# Patient Record
Sex: FEMALE | Race: BLACK OR AFRICAN AMERICAN | NOT HISPANIC OR LATINO | Employment: FULL TIME | URBAN - METROPOLITAN AREA
[De-identification: names, ages, dates, MRNs, and addresses within clinical notes are randomized per-mention and may not be internally consistent; named-entity substitution may affect disease eponyms.]

---

## 2018-08-18 ENCOUNTER — HOSPITAL ENCOUNTER (EMERGENCY)
Facility: HOSPITAL | Age: 48
Discharge: HOME | End: 2018-08-18
Attending: EMERGENCY MEDICINE
Payer: COMMERCIAL

## 2018-08-18 VITALS
TEMPERATURE: 97 F | DIASTOLIC BLOOD PRESSURE: 78 MMHG | WEIGHT: 180 LBS | HEART RATE: 84 BPM | HEIGHT: 65 IN | BODY MASS INDEX: 29.99 KG/M2 | RESPIRATION RATE: 20 BRPM | OXYGEN SATURATION: 98 % | SYSTOLIC BLOOD PRESSURE: 160 MMHG

## 2018-08-18 DIAGNOSIS — S30.0XXA CONTUSION OF LOWER BACK, INITIAL ENCOUNTER: ICD-10-CM
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
## 2018-08-18 PROCEDURE — 99284 EMERGENCY DEPT VISIT MOD MDM: CPT | Mod: 25

## 2018-08-18 PROCEDURE — 96375 TX/PRO/DX INJ NEW DRUG ADDON: CPT

## 2018-08-18 PROCEDURE — 63600175 PHARM REV CODE 636 W HCPCS: Performed by: PHYSICIAN ASSISTANT

## 2018-08-18 PROCEDURE — 96374 THER/PROPH/DIAG INJ IV PUSH: CPT

## 2018-08-18 PROCEDURE — 25000003 PHARM REV CODE 250: Performed by: PHYSICIAN ASSISTANT

## 2018-08-18 RX ORDER — NAPROXEN 500 MG/1
500 TABLET ORAL 2 TIMES DAILY WITH MEALS
Qty: 14 TABLET | Refills: 0 | Status: SHIPPED | OUTPATIENT
Start: 2018-08-18 | End: 2018-08-25

## 2018-08-18 RX ORDER — MORPHINE SULFATE 4 MG/ML
4 INJECTION, SOLUTION INTRAMUSCULAR; INTRAVENOUS
Status: COMPLETED | OUTPATIENT
Start: 2018-08-18 | End: 2018-08-18

## 2018-08-18 RX ORDER — HYDRALAZINE HYDROCHLORIDE 20 MG/ML
20 INJECTION INTRAMUSCULAR; INTRAVENOUS
Status: COMPLETED | OUTPATIENT
Start: 2018-08-18 | End: 2018-08-18

## 2018-08-18 RX ORDER — CYCLOBENZAPRINE HCL 10 MG
10 TABLET ORAL 3 TIMES DAILY PRN
Qty: 21 TABLET | Refills: 0 | Status: SHIPPED | OUTPATIENT
Start: 2018-08-18 | End: 2018-08-25

## 2018-08-18 RX ORDER — HYDROCHLOROTHIAZIDE 25 MG/1
25 TABLET ORAL
Status: COMPLETED | OUTPATIENT
Start: 2018-08-18 | End: 2018-08-18

## 2018-08-18 RX ORDER — AMLODIPINE BESYLATE 5 MG/1
5 TABLET ORAL
Status: COMPLETED | OUTPATIENT
Start: 2018-08-18 | End: 2018-08-18

## 2018-08-18 RX ADMIN — AMLODIPINE BESYLATE 5 MG: 5 TABLET ORAL at 02:08

## 2018-08-18 RX ADMIN — HYDRALAZINE HYDROCHLORIDE 20 MG: 20 INJECTION INTRAMUSCULAR; INTRAVENOUS at 02:08

## 2018-08-18 RX ADMIN — MORPHINE SULFATE 4 MG: 4 INJECTION, SOLUTION INTRAMUSCULAR; INTRAVENOUS at 01:08

## 2018-08-18 RX ADMIN — HYDROCHLOROTHIAZIDE 25 MG: 25 TABLET ORAL at 02:08

## 2018-08-18 NOTE — ED PROVIDER NOTES
Encounter Date: 8/18/2018    SCRIBE #1 NOTE: Anabell SALEH am scribing for, and in the presence of, Johanna Cramer PA-C.       History     Chief Complaint   Patient presents with    Fall    Tailbone Pain       Time seen by provider: 1:11 PM on 08/18/2018    Meg Lockhart is a 48 y.o. female with NIDDM, HTN, and HLD who presents to the ED with back pain and tailbone pain onset PTA. Patient states she was at the grocery store when she slipped on condensation from the doors and injured her left elbow, lower back, and tailbone. Patient denies any LOC, hitting her head, neck pain, bowel/bladder incontinence, numbness, weakness, nausea, or vomiting.       The history is provided by the patient. No  was used.     Review of patient's allergies indicates:   Allergen Reactions    Pcn [penicillins] Itching     Past Medical History:   Diagnosis Date    Anxiety     Diabetes mellitus     Elevated cholesterol     Hypertension      Past Surgical History:   Procedure Laterality Date    HYSTERECTOMY       Family History   Problem Relation Age of Onset    Heart disease Mother     Hypertension Mother     Stroke Brother     Cancer Maternal Aunt     Cancer Maternal Grandmother         lung    Heart disease Maternal Grandmother     Diabetes Paternal Grandmother     Heart disease Paternal Grandmother     Diabetes Paternal Grandfather      Social History     Tobacco Use    Smoking status: Never Smoker   Substance Use Topics    Alcohol use: No    Drug use: No     Review of Systems   Constitutional: Negative for activity change, appetite change, chills and fever.   HENT: Negative for congestion, rhinorrhea and sore throat.    Eyes: Negative for redness and visual disturbance.   Respiratory: Negative for cough, chest tightness and shortness of breath.    Cardiovascular: Negative for chest pain.   Gastrointestinal: Negative for abdominal pain, diarrhea, nausea and vomiting.   Genitourinary:  Negative for dysuria and frequency.   Musculoskeletal: Positive for arthralgias (elbow, tailbone) and back pain. Negative for neck pain and neck stiffness.   Skin: Negative for rash.   Neurological: Negative for dizziness, syncope, numbness and headaches.       Physical Exam     Initial Vitals [08/18/18 1303]   BP Pulse Resp Temp SpO2   (!) 205/114 76 20 97.5 °F (36.4 °C) 100 %      MAP       --         Physical Exam    Nursing note and vitals reviewed.  Constitutional: Vital signs are normal. She appears well-developed and well-nourished. She is cooperative.  Non-toxic appearance. She does not have a sickly appearance.   HENT:   Head: Normocephalic and atraumatic.   Right Ear: Abnromal external ear normal.   Left Ear: Abnormal external ear normal.   Nose: Nose abnormal.   Mouth/Throat: Oropharynx is clear and moist.   Eyes: Conjunctivae and lids are normal. Pupils are equal, round, and reactive to light.   Neck: Normal range of motion and full passive range of motion without pain. Neck supple.   Cardiovascular: Normal rate, regular rhythm and normal heart sounds. Exam reveals no gallop and no friction rub.    No murmur heard.  Pulmonary/Chest: Breath sounds normal. She has no wheezes. She has no rhonchi. She has no rales.   Abdominal: Soft. Normal appearance. There is no tenderness. There is no rigidity, no rebound and no guarding.   Musculoskeletal:        Left elbow: She exhibits normal range of motion and no swelling. Tenderness found. Medial epicondyle and lateral epicondyle tenderness noted.        Lumbar back: She exhibits tenderness and bony tenderness. She exhibits normal range of motion, no swelling and no edema.   Bony tenderness to coccyx and lumbar spine with left paraspinal muscle tenderness. Normal strength to bilateral lower extremities. Sensation intact. Tenderness to the left elbow with no effusion noted. Full ROM.   Neurological: She is alert and oriented to person, place, and time.   Skin: Skin  is warm, dry and intact. No rash noted.         ED Course   Procedures  Labs Reviewed - No data to display       Imaging Results          X-Ray Lumbar Spine Ap And Lateral (Final result)  Result time 08/18/18 13:59:03    Final result by Eriberto Jaime MD (08/18/18 13:59:03)                 Impression:      1. There is no acute lumbar spine abnormality.  There is no fracture or malalignment.      Electronically signed by: Eriberto Jaime MD  Date:    08/18/2018  Time:    13:59             Narrative:    EXAMINATION:  XR LUMBAR SPINE AP AND LATERAL    CLINICAL HISTORY:  fall;Unspecified fall, initial encounter    TECHNIQUE:  AP, lateral and spot images were performed of the lumbar spine.    COMPARISON:  None    FINDINGS:  Bone density is normal.  The lumbar vertebral bodies maintain normal height and alignment.  There is no significant disc space narrowing.  There is facet joint arthropathy at the L5-S1 level.  Surgical clips overlie the pelvis.                               X-Ray Elbow Complete Left (Final result)  Result time 08/18/18 13:59:34    Final result by Eriberto Jaime MD (08/18/18 13:59:34)                 Impression:      As above.      Electronically signed by: Eriberto Jaime MD  Date:    08/18/2018  Time:    13:59             Narrative:    EXAMINATION:  XR ELBOW COMPLETE 3 VIEW LEFT    CLINICAL HISTORY:  Unspecified fall, initial encounter    TECHNIQUE:  AP, lateral, and oblique views of the left elbow were performed.    COMPARISON:  None    FINDINGS:  In IV is in place.  Bone density is normal.  There is no fracture dislocation or other acute bone or joint abnormality.  There is no large joint effusion.                               X-Ray Sacrum And Coccyx (Final result)  Result time 08/18/18 14:00:41    Final result by Eriberto Jaime MD (08/18/18 14:00:41)                 Impression:      1. There is no obvious acute sacral or coccygeal abnormality.  Bowel gas, stool in surgical  clips overlie the lower sacrum and coccyx.  There is no obvious displaced fracture.      Electronically signed by: Eriberto Jaime MD  Date:    08/18/2018  Time:    14:00             Narrative:    EXAMINATION:  XR SACRUM AND COCCYX    CLINICAL HISTORY:  Unspecified fall, initial encounter    TECHNIQUE:  AP and lateral views of the sacrum and coccyx were obtained    COMPARISON:  Plain films of the pelvis obtained concurrently    FINDINGS:  Multiple surgical clips are present in the pelvis and partially overlie the coccyx.  The sacral struts are grossly intact without fracture.  The pubic rami are intact.  There is no displaced coccygeal fracture.  Bone density is normal.                                 Medical Decision Making:   History:   Old Medical Records: I decided to obtain old medical records.  Clinical Tests:   Lab Tests: Ordered and Reviewed  Radiological Study: Ordered and Reviewed       APC / Resident Notes:   Urgent evaluation of a 48-year-old female who presents with back, Coxsackie and left elbow pain after mechanical fall prior to arrival.  She is neurovascularly intact.  X-ray show no acute fracture.  She reports relief in symptoms after given IV morphine.  She will prescribe NSAIDs and muscle relaxer at home.  Conservative treatment.  Follow up with Orthopedics if symptoms do not improve. Discussed results with patient. Return precautions given. Based on my clinical evaluation, I do not appreciate any immediate, emergent, or life threatening condition or etiology that warrants additional workup today and feel that the patient can be discharged with close follow up care.  Patient is to follow up with their primary care provider. Case was discussed with Dr. Garcia who is in agreement with the plan of care. All questions answered.          Scribe Attestation:   Scribe #1: I performed the above scribed service and the documentation accurately describes the services I performed. I attest to the accuracy  of the note.    I, Johanna Jj PA-C, personally performed the services described in this documentation. All medical record entries made by the scribe were at my direction and in my presence.  I have reviewed the chart and agree that the record reflects my personal performance and is accurate and complete. Johanna Jj PA-C.  3:56 PM 08/18/2018             Clinical Impression:   The primary encounter diagnosis was Fall. A diagnosis of Contusion of lower back, initial encounter was also pertinent to this visit.      Disposition:   Disposition: Discharged  Condition: Stable                        Johanna Jj PA-C  08/18/18 1552

## 2018-08-18 NOTE — DISCHARGE INSTRUCTIONS
You can get a donut cushion or pillow usually from [employer] or different drug stores.  For worsening symptoms, chest pain, shortness of breath, increased abdominal pain, high grade fever, stroke or stroke like symptoms, immediately go to the nearest Emergency Room or call 911 as soon as possible.

## 2018-08-18 NOTE — ED NOTES
AAOx4, skin warm/dry, respirations even/unlabored.  Appears in mild distress, lying prone on stretcher.  Complains of buttocks, low back and bilateral elbow pain.

## 2018-10-29 NOTE — PROGRESS NOTES
"Subjective:       Patient ID: Meg Lockhart is a 48 y.o. female.    Chief Complaint: No chief complaint on file.    CC: Weight , problems with sleeve.     Current attempts at weight loss: New pt to me, referred by [facility] Self  No address on file , with Patient Active Problem List:     Type II or unspecified type diabetes mellitus with unspecified complication, uncontrolled     Diabetic neuropathy     Obesity     Vitamin D deficiency disease    H/o PUD           S/p sleeve 4 years ago with Dr. Hewitt. Has been gaining weight. Does not exercise. Feels she does not get enough protein. She has not had follow up in sometime. Thinks she has labs with PCP in past couple of months. States she has HA often. + reflux sx. Takes reglan prn. EGD last done 2 years ago with Dr. Hewitt. She does have h/o gerd predating the sleeve. Am BS in 200s. 252 this AM.    Does not know last A1c.     Previous diet attempts:     History of medication for loss: Phentermine in past. Was jittery with a half a pill.     Heaviest weight:  242#    Lightest weight: 154# after surgery. Started gaining weight back in past 2 years.     Goal weight: Pt goes on a bit, but does not give clear answer. Felt she was too thin at 154#      Last eye exam:    No glaucoma per pt.  Provider:    Typical eating patterns: Works as a . Lives with  and 3 kids. Pt does cooking.   Breakfast: skips. " says she eats when her body says she can"    Lunch: cereal and milk. Sausage and eggs. Power.     Dinner:   Red beans and chicken.     Snacks: chips, candy,    Beverages: Water, juice, soda. Will "guzzle" liquids if her BS is high.     Willingness to change:   10/10      BMR: 1578      Review of Systems   Constitutional: Positive for diaphoresis. Negative for chills and fever.   Respiratory: Positive for shortness of breath.         + snores. Denies H/o CHI   Cardiovascular: Negative for chest pain and leg swelling.   Gastrointestinal: Positive " "for nausea and vomiting.        + gerd   Endocrine: Positive for cold intolerance.   Genitourinary: Negative for difficulty urinating.        S/p hyst   Musculoskeletal: Positive for arthralgias and back pain.   Neurological: Positive for dizziness and headaches. Negative for light-headedness.   Psychiatric/Behavioral: Positive for decreased concentration. Negative for dysphoric mood. The patient is nervous/anxious.        Objective:     /79   Pulse 72   Ht 5' 5" (1.651 m)   Wt 93.7 kg (206 lb 9.1 oz)   BMI 34.38 kg/m²     Physical Exam   Constitutional: She is oriented to person, place, and time. She appears well-developed and well-nourished. No distress.   obese   HENT:   Head: Normocephalic and atraumatic.   Eyes: EOM are normal. Pupils are equal, round, and reactive to light. No scleral icterus.   Neck: Normal range of motion. Neck supple. No thyromegaly present.   Cardiovascular: Normal rate and normal heart sounds. Exam reveals no gallop and no friction rub.   No murmur heard.  Pulmonary/Chest: Effort normal and breath sounds normal. No respiratory distress. She has no wheezes.   Abdominal: Soft. Bowel sounds are normal. She exhibits no distension. There is tenderness in the epigastric area.   Musculoskeletal: Normal range of motion. She exhibits no edema.   Neurological: She is alert and oriented to person, place, and time. No cranial nerve deficit.   Skin: Skin is warm and dry. No erythema.   Psychiatric: She has a normal mood and affect. Her behavior is normal. Judgment normal.   Vitals reviewed.      Assessment:       1. Obesity, Class II, BMI 35-39.9, no comorbidity    2. S/P laparoscopic sleeve gastrectomy    3. Gastroesophageal reflux disease, esophagitis presence not specified    4. Type 2 diabetes, uncontrolled, with neuropathy        Plan:         1. Obesity, Class II, BMI 35-39.9, no comorbidity  Aim for 100 grams of protein 800-1000 calories daily.     800-1000 cynthia menu and meal ideas " given.     2. S/P laparoscopic sleeve gastrectomy  Continue bariatric diet for life.   Nutrition materials provided today.    [employer] (code [id number])   DILLAN for Records from Dr. Hewitt signed.   3. Gastroesophageal reflux disease, esophagitis presence not specified  Current tx inadequate. Will start triple therapy and see how she responds. Can stop carafate after 1 month. If sx return will need upper GI and EGD.   - pantoprazole (PROTONIX) 40 MG tablet; Take 1 tablet (40 mg total) by mouth once daily.  Dispense: 90 tablet; Refill: 1  - sucralfate (CARAFATE) 1 gram tablet; Take 1 tablet (1 g total) by mouth 4 (four) times daily.  Dispense: 120 tablet; Refill: 0  - ranitidine (ZANTAC) 150 MG tablet; Take 1 tablet (150 mg total) by mouth 2 (two) times daily.  Dispense: 180 tablet; Refill: 1    4. Type 2 diabetes, uncontrolled, with neuropathy  Start Ozempic once a week. Start with 0.25mg once a week x 4 weeks, then 0.5 mg weekly. Once you get to 0.5mg , you can stop insulin basaglar.     Decrease portions as soon as you start Ozempic. Some nausea in the first 2 weeks is not unusual.     If you get pain across the upper abdomen and around to your back, please call the office.     - semaglutide (OZEMPIC) 0.25 mg or 0.5 mg(2 mg/1.5 mL) PnIj; Inject 0.5 mg into the skin every 7 days.  Dispense: 1.5 mL; Refill: 2   DILLAN from PCP signed.

## 2018-10-30 ENCOUNTER — OFFICE VISIT (OUTPATIENT)
Dept: BARIATRICS | Facility: CLINIC | Age: 48
End: 2018-10-30
Payer: COMMERCIAL

## 2018-10-30 VITALS
HEIGHT: 65 IN | DIASTOLIC BLOOD PRESSURE: 79 MMHG | WEIGHT: 206.56 LBS | BODY MASS INDEX: 34.41 KG/M2 | HEART RATE: 72 BPM | SYSTOLIC BLOOD PRESSURE: 137 MMHG

## 2018-10-30 DIAGNOSIS — Z98.84 S/P LAPAROSCOPIC SLEEVE GASTRECTOMY: ICD-10-CM

## 2018-10-30 DIAGNOSIS — K21.9 GASTROESOPHAGEAL REFLUX DISEASE, ESOPHAGITIS PRESENCE NOT SPECIFIED: ICD-10-CM

## 2018-10-30 DIAGNOSIS — E66.9 OBESITY, CLASS II, BMI 35-39.9, NO COMORBIDITY: Primary | ICD-10-CM

## 2018-10-30 PROCEDURE — 99999 PR PBB SHADOW E&M-EST. PATIENT-LVL III: CPT | Mod: PBBFAC,,, | Performed by: INTERNAL MEDICINE

## 2018-10-30 PROCEDURE — 99205 OFFICE O/P NEW HI 60 MIN: CPT | Mod: S$GLB,,, | Performed by: INTERNAL MEDICINE

## 2018-10-30 RX ORDER — INSULIN GLARGINE 100 [IU]/ML
7 INJECTION, SOLUTION SUBCUTANEOUS NIGHTLY
COMMUNITY
End: 2019-03-01

## 2018-10-30 RX ORDER — AMLODIPINE BESYLATE 5 MG/1
TABLET ORAL
COMMUNITY
Start: 2018-08-22 | End: 2022-03-09

## 2018-10-30 RX ORDER — LORAZEPAM 1 MG/1
1 TABLET ORAL
COMMUNITY

## 2018-10-30 RX ORDER — METOCLOPRAMIDE 5 MG/1
5 TABLET ORAL DAILY PRN
COMMUNITY
End: 2018-10-30

## 2018-10-30 RX ORDER — PANTOPRAZOLE SODIUM 40 MG/1
40 TABLET, DELAYED RELEASE ORAL DAILY
Qty: 90 TABLET | Refills: 1 | Status: SHIPPED | OUTPATIENT
Start: 2018-10-30 | End: 2022-02-25 | Stop reason: SDUPTHER

## 2018-10-30 RX ORDER — SITAGLIPTIN 100 MG/1
TABLET, FILM COATED ORAL
COMMUNITY
Start: 2018-10-22 | End: 2018-10-30

## 2018-10-30 RX ORDER — SUCRALFATE 1 G/1
1 TABLET ORAL 4 TIMES DAILY
Qty: 120 TABLET | Refills: 0 | Status: SHIPPED | OUTPATIENT
Start: 2018-10-30 | End: 2018-11-29

## 2018-10-30 RX ORDER — VALSARTAN 160 MG/1
TABLET ORAL
COMMUNITY
Start: 2018-10-24 | End: 2019-06-17

## 2018-10-30 RX ORDER — HYDROCHLOROTHIAZIDE 25 MG/1
TABLET ORAL
COMMUNITY
Start: 2018-10-22 | End: 2022-03-09

## 2018-10-30 NOTE — PATIENT INSTRUCTIONS
Start Ozempic once a week. Start with 0.25mg once a week x 4 weeks, then 0.5 mg weekly. Once you get to 0.5mg , you can stop insulin basaglar.     Decrease portions as soon as you start Ozempic. Some nausea in the first 2 weeks is not unusual.     If you get pain across the upper abdomen and around to your back, please call the office.       - To lose weight you want to cut 100% starchy carbohydrates out of your diet (bread, rice, pasta, potatoes, granola, flour, corn, peas, oatmeal, grits, tortillas, crackers, chips) and get  grams of protein.  Aim for 100 grams of protein 800-1000 calories daily.     - [employer] (code 98792).     - No soda, sweet tea, juices, sports drinks or lemonade     -Exercise daily. Patient counseled in strategies for long term weight loss and maintenance: Keeping a food diary, exercise for 1 hour a day and eating breakfast everyday.     - Premier Protein (Chocolate, Bananas & Cream, Strawberries & Cream, Vanilla) [employer] or [employer]    - Syntrax Beason from [employer], www.bariatricadClassWalletage.com, www.bariatricchoice.com. (LACTOSE FREE)    - [employer] - Amazon.com (LACTOSE FREE)    - Veggetti Pro from [employer], [employer], [employer]    - www.pinterest.com (cauliflower, cloud bread, quest bar cookies, eggplant, zucchini, zucchini noodles, crustless quiche, no carb meals, taco lettuce boats)    - [email or web address].[email or web address]/      5-Day Menu Plan: 800-1000 Calories    DAY 1     Breakfast  4 slices deli turkey  Small apple    Snack  ¼ cup almonds    Lunch  Lean hamburger samantha  1 cup green beans    Dinner  2 skinless chicken thighs  1 cup cooked carrots    Snack  SF jello    DAY 2    Breakfast  2 eggs with 2 tbsp salsa    Snack  2 slices deli turkey  ½ cup Peaches canned (in its own juice)    Lunch  Orange: Deli chicken and mustard   Pear cup (no sugar added)    Dinner  Baked fish  1 cup cooked yellow squash    Snack  SF popsicle            DAY 3    Breakfast    Protein drink: 1 scoop whey powder + 6oz soy milk    Snack  ¼ cup almonds    Lunch  Tuna Salad: 3oz canned tuna in water, 1 egg, and 1 tsp light putnam)  Pineapple cup (no sugar added)    Dinner  Baked chicken breast  1 cup grilled eggplant    Snack  8oz Chocolate Soy Milk      DAY 4    Breakfast  2 eggs, turkey sausage samantha    Snack  4 slices deli turkey    Lunch    Snack  1/4 cup almonds  Peach cup (no sugar added)    Dinner  3oz baked pork chop  1 cup cooked green beans                    DAY 5    Breakfast  Protein drink: 1 scoop whey powder + 6oz soy milk    Snack   ¼ cup almonds  1 apple    Lunch  1 cup Chicken salad: (3oz canned chicken in water, 1 egg, 1 tsp light putnam)    Snack  4 slices deli turkey  Fruit cup (no sugar added)    Dinner  Omelet: 2 eggs, grilled shrimp, bell pepper and onion        Fruits and Vegetables       Include 1-2 servings of fruit daily.      1 serving of fruit includes ½ cup unsweetened applesauce, ½ medium banana, tennis ball size piece of fruit, 17 grapes, 1 cup melon, 1 cup strawberries, ¼ cup dried fruit     Include 2-3 servings of vegetables daily. 1 serving is 1 cup raw or ½ cup cooked.     Non-starchy vegetables include artichoke, asparagus, baby corn, bamboo shoots, beans: green/Italian/wax, bean sprouts, beets, broccoli, Hayden sprouts, cabbage, carrots, cauliflower, celery, cucumber, eggplant, green onions or scallions, greens, jicama, leeks, mushrooms, okra, onions, pea pods, peppers, radishes, spinach, summer squash, tomatoes and salsa, turnips, vegetable juice cocktail, water chestnuts, zucchini        Meal Ideas for Regular Bariatric Diet  *Recipes and products available at www.bariatriceating.com      Breakfast: (15-20g protein)    - Egg white omelet: 2 egg whites or ½ cup Egg Beaters. (Optional proteins: cheese, shrimp, black beans, chicken, sliced turkey) (Optional veggies: tomatoes, salsa, spinach, mushrooms, onions, green peppers, or small slice avocado)      - Egg and sausage: 1 egg or ¼ cup Egg Beaters (any variety), with 1 baltazar or 2 links of Turkey sausage or Veggie breakfast sausage (140 Proof or [employer])    - Crust-less breakfast quiche: To make a glass pie dish, mix 4oz part skim Ricotta, 1 cup skim milk, and 2 eggs as your base. Add protein: shredded cheese, sliced lean ham or turkey, turkey davidson/sausage. Add veggies: tomato, onion, green onion, mushroom, green pepper, spinach, etc.    - Yogurt parfait: Mix 1 - 6oz container Dannon Light N Fit vanilla yogurt, with ¼ cup Kashi Go Lean cereal    - Cottage cheese and fruit: ½ cup part-skim cottage cheese or ricotta cheese topped with fresh fruit or sugar free preserves     - Julieta Cesar's Vanilla Egg custard* (add 2 Tbsp instant coffee granules to make Cappuccino Custard*)    - Hi-Protein café latte (skim milk, decaf coffee, 1 scoop protein powder). Optional to add Sugar free syrup or extract flavoring.    Lunch: (20-30g protein)    - ½ cup Black bean soup (Homemade or Progresso), with ¼ cup shredded low-fat cheese. Top with chopped tomato or fresh salsa.     - Lean deli turkey breast and low-fat sliced cheese, mustard or light putnam to moisten, rolled up together, or wrapped in a Homero lettuce leaf    - Chicken salad made from dinner leftovers, moisten with low-fat salad dressing or light putnam. Also try leftover salmon, shrimp, tuna or boiled eggs. Serve ½ cup over dark green salad    - Fat-free canned refried beans, topped with ¼ cup shredded low-fat cheese. Top with chopped tomato or fresh salsa.     - Greek salad: Top mixed greens with 1-2oz grilled chicken, tomatoes, red onions, 2-3 kalamata olives, and sprinkle lightly with feta cheese. Spritz with Balsamic vinegar to taste.     - Crust-less lunch quiche: To make a glass pie dish, mix 4oz part skim Ricotta, 1 cup skim milk, and 2 eggs as your base. Add protein: shredded cheese, sliced lean ham or turkey, shrimp, chicken. Add veggies: tomato, onion,  green onion, mushroom, green pepper, spinach, artichoke, broccoli, etc.    - Pizza bake: tomato sauce, low-fat shredded mozzarella and turkey pepperoni or Kyrgyz davidson. Add any veggies.    - Cucumber crab bites: Spread ¼ cup crab dip (lump crabmeat + light cream cheese and green onions) over sliced cucumber.     - Chicken with light spinach and artichoke dip*: Puree in : 6oz cooked and drained spinach, 2 cloves garlic, 1 can cannelloni beans, ½ cup chopped green onions, 1 can drained artichoke hearts (not marinated in oil), lemon juice and basil. Mix in 2oz chopped up chicken.    Supper: (20-30g protein)    - Serve grilled fish over dark green salad tossed with low-fat dressing, served with grilled asparagus chew     - Rotisserie chicken salad: served with sliced strawberries, walnuts, fat-free feta cheese crumbles and 1 tbsp Mcclains Own Light Raspberry Brave Vinaigrette    - Shrimp cocktail: Dip cold boiled shrimp in homemade low-sugar cocktail sauce (1/2 cup Bita One Carb ketchup, 2 tbsp horseradish, 1/4 tsp hot sauce, 1 tsp Worcestershire sauce, 1 tbsp freshly-squeezed lemon juice). Serve with dark green salad, walnuts, and crumbled blue cheese drizzled with olive oil and Balsamic vinegar    - Tuna Melt: Spread tuna salad onto 2 thick slices of tomato. Top with low-fat cheese and broil until cheese is melted. May also be made with chicken salad of shrimp salad. [place] with different types of cheeses.    - Homemade low-fat Chili using extra lean ground beef or ground turkey. Top with shredded cheese and salsa as desired. May add dollop fat-free sour cream if desired    - Dinner Omelet with shrimp or chicken and onion, green peppers and chives.    - No noodle lasagna: Use sliced zucchini or eggplant in place of noodles.  Layer with part skim ricotta cheese and low sugar meat sauce (use very lean ground beef or ground turkey).    - Mexican chicken bake: Bake chunks of chicken breast or  thigh with taco seasoning, Pace brand enchilada sauce, green onions and low-fat cheese. Serve with ¼ cup black beans or fat free refried beans topped with chopped tomatoes or salsa.    - Jack frozen meatballs, simmered in Classico Marinara sauce. Different flavors of salsa or spaghetti sauce create different dishes! Sprinkle with parmesan cheese. Serve with grilled or steamed veggies, or a dark green salad.    - Simmer boneless skinless chicken thigh chunks in Classico Marinara sauce or roasted salsa until tender with chopped onion, bell pepper, garlic, mushrooms, spinach, etc.     - Hamburger, without the bun, dressed the way you like. Served with grilled or steamed veggies.    - Eggplant parmesan: Bake slices of eggplant at 350 degrees for 15 minutes. Layer tomato sauce, sliced eggplant and low-fat mozzarella cheese in a baking dish and cover with foil. Bake 30-40 more minutes or until bubbly. Uncover and bake at 400 degrees for about 15 more minutes, or until top is slightly crisp.    - Fish tacos: grilled/baked white fish, wrapped in Homero lettuce leaf, topped with salsa, shredded low-fat cheese, and light coleslaw.    Snacks: (100-200 calories; >5g protein)    - 1 low-fat cheese stick with 8 cherry tomatoes or 1 serving fresh fruit  - 4 thin slices fat-free turkey breast and 1 slice low-fat cheese  - 4 thin slices fat-free honey ham with wedge of melon  - 1/4 cup unsalted nuts with ½ cup fruit  - 6-oz container Dannon Light n Fit vanilla yogurt, topped with 1oz unsalted nuts         - apple, celery or baby carrots spread with 2 Tbsp natural peanut butter or almond butter   - apple slices with 1 oz slice low-fat cheese  - celery, cucumber, bell pepper or baby carrots dipped in ¼ cup hummus bean spread or light spinach and artichoke dip (*recipe in lunch section)  - 100 calorie bag microwave light popcorn with 3 tbsp grated parmesan cheese  - Adan Links Beef Steak - 14g protein! (similar to beef  beatriz)  - 2 wedges Laughing Cow - Light Herb & Garlic Cheese with sliced cucumber or green bell pepper  - 1/2 cup low-fat cottage cheese with ¼ cup fruit or ¼ cup salsa  - RTD Protein drinks: Atkins, Low Carb Slim Fast, EAS light, Muscle Milk Light, etc.  - Homemade Protein drinks: [employer]NC Soy95, Isopure, Nectar, UNJURY, Whey Gourmet, etc. Mix 1 scoop powder with 8oz skim/1% milk or light soymilk.  - Protein bars: Atkins, EAS, Pure Protein, Think Thin, Detour, etc. Must have 0-4 grams sugar - Read the label.    Takeout Options: No more than twice/week  Deli - Salads (no pasta or rice), meats, cheeses. Roasted chicken. Lox (salmon)    Mexican - Platters which don't include tortillas, chips, or rice. Go easy on the beans. Example: Fajitas without the tortillas. Ask the  not to bring chips to the table if they are too tempting.    Greek - Meat or fish and vegetable, but no bread or rice. Including hummus, baba ganoush, etc, is OK. Most sit-down Greek restaurants can provide you with cucumber slices for dipping instead of jacquelyn bread.    Fast Food (Avoid as much as possible) - Salads (no croutons and limit salad dressing to 2 tbsp), grilled chicken sandwich without the bun and ask for no putnam. Sarahs low fat chili or Taco Bell pintos and cheese.    BBQ - The meats are fine if you ask for sauces on the side, but most of the traditional side dishes are loaded with carbs. Cuauhtemoc slaw, baked beans and BBQ sauce are typically made with sugar.    Chinese - Nothing deep-fried, no rice or noodles. Many Chinese sauces have starch and sugar in them, so you'll have to use your judgement. If you find that these sauces trigger cravings, or cause Dumping, you can ask for the sauce to be made without sugar or just use soy sauce.

## 2018-11-12 ENCOUNTER — TELEPHONE (OUTPATIENT)
Dept: BARIATRICS | Facility: CLINIC | Age: 48
End: 2018-11-12

## 2018-11-26 ENCOUNTER — TELEPHONE (OUTPATIENT)
Dept: BARIATRICS | Facility: CLINIC | Age: 48
End: 2018-11-26

## 2018-11-26 NOTE — TELEPHONE ENCOUNTER
Called patient.  She stated that reflux is better and is taking medication as prescribed.  No further action needed.

## 2018-12-27 ENCOUNTER — OFFICE VISIT (OUTPATIENT)
Dept: URGENT CARE | Facility: CLINIC | Age: 48
End: 2018-12-27
Payer: COMMERCIAL

## 2018-12-27 VITALS
OXYGEN SATURATION: 98 % | HEART RATE: 98 BPM | RESPIRATION RATE: 14 BRPM | WEIGHT: 202 LBS | SYSTOLIC BLOOD PRESSURE: 138 MMHG | BODY MASS INDEX: 33.61 KG/M2 | DIASTOLIC BLOOD PRESSURE: 88 MMHG

## 2018-12-27 DIAGNOSIS — R35.0 URINARY FREQUENCY: ICD-10-CM

## 2018-12-27 DIAGNOSIS — N39.0 URINARY TRACT INFECTION WITHOUT HEMATURIA, SITE UNSPECIFIED: Primary | ICD-10-CM

## 2018-12-27 LAB
BILIRUB UR QL STRIP: NEGATIVE
GLUCOSE SERPL-MCNC: 254 MG/DL (ref 70–110)
GLUCOSE UR QL STRIP: POSITIVE
KETONES UR QL STRIP: NEGATIVE
LEUKOCYTE ESTERASE UR QL STRIP: POSITIVE
PH, POC UA: 5.5
POC BLOOD, URINE: NEGATIVE
POC NITRATES, URINE: POSITIVE
PROT UR QL STRIP: NEGATIVE
SP GR UR STRIP: 1.02 (ref 1–1.03)
UROBILINOGEN UR STRIP-ACNC: ABNORMAL (ref 0.1–1.1)

## 2018-12-27 PROCEDURE — 82962 GLUCOSE BLOOD TEST: CPT | Mod: ,,, | Performed by: NURSE PRACTITIONER

## 2018-12-27 PROCEDURE — 99204 OFFICE O/P NEW MOD 45 MIN: CPT | Mod: 25,S$GLB,, | Performed by: NURSE PRACTITIONER

## 2018-12-27 PROCEDURE — 81003 URINALYSIS AUTO W/O SCOPE: CPT | Mod: QW,S$GLB,, | Performed by: NURSE PRACTITIONER

## 2018-12-27 RX ORDER — NITROFURANTOIN 25; 75 MG/1; MG/1
100 CAPSULE ORAL 2 TIMES DAILY
Qty: 14 CAPSULE | Refills: 0 | Status: SHIPPED | OUTPATIENT
Start: 2018-12-27 | End: 2019-01-03

## 2018-12-27 RX ORDER — FLUCONAZOLE 150 MG/1
150 TABLET ORAL DAILY
Qty: 1 TABLET | Refills: 0 | Status: SHIPPED | OUTPATIENT
Start: 2018-12-27 | End: 2018-12-28

## 2018-12-27 RX ORDER — PHENAZOPYRIDINE HYDROCHLORIDE 200 MG/1
200 TABLET, FILM COATED ORAL 3 TIMES DAILY PRN
Qty: 6 TABLET | Refills: 0 | Status: SHIPPED | OUTPATIENT
Start: 2018-12-27 | End: 2018-12-29

## 2018-12-28 NOTE — PATIENT INSTRUCTIONS

## 2018-12-28 NOTE — PROGRESS NOTES
Subjective:       Patient ID: Meg Lockhart is a 48 y.o. female.    Vitals:  weight is 91.6 kg (202 lb). Her blood pressure is 138/88 and her pulse is 98. Her respiration is 14 and oxygen saturation is 98%.     Chief Complaint: Urinary Frequency    Urinary Frequency    This is a new problem. The current episode started in the past 7 days. The problem has been unchanged. The quality of the pain is described as aching. The pain is at a severity of 7/10. The pain is mild. There has been no fever. She is sexually active. There is no history of pyelonephritis. Associated symptoms include flank pain, frequency and urgency. Pertinent negatives include no chills, nausea, vomiting or rash. Associated symptoms comments: Pain around the stomach(x1 day) , vaginal pressure and pain , scanty urine . Treatments tried: azo. The treatment provided mild relief. Her past medical history is significant for diabetes mellitus and kidney stones.       Constitution: Negative for chills, fatigue and fever.   HENT: Negative for congestion and sore throat.    Neck: Negative for painful lymph nodes.   Cardiovascular: Negative for chest pain and leg swelling.   Eyes: Negative for double vision and blurred vision.   Respiratory: Negative for cough and shortness of breath.    Gastrointestinal: Negative for nausea, vomiting and diarrhea.   Genitourinary: Positive for dysuria, frequency, urgency and flank pain. Negative for history of kidney stones.   Musculoskeletal: Negative for joint pain, joint swelling, muscle cramps and muscle ache.   Skin: Negative for color change, pale, rash and bruising.   Allergic/Immunologic: Negative for seasonal allergies.   Neurological: Negative for dizziness, history of vertigo, light-headedness, passing out and headaches.   Hematologic/Lymphatic: Negative for swollen lymph nodes.   Psychiatric/Behavioral: Negative for nervous/anxious, sleep disturbance and depression. The patient is not nervous/anxious.         Objective:      Physical Exam   Constitutional: She is oriented to person, place, and time. She appears well-developed and well-nourished. She is cooperative.  Non-toxic appearance. She does not appear ill. No distress.   HENT:   Head: Normocephalic and atraumatic.   Right Ear: Hearing, tympanic membrane, external ear and ear canal normal.   Left Ear: Hearing, tympanic membrane, external ear and ear canal normal.   Nose: Nose normal. No mucosal edema, rhinorrhea or nasal deformity. No epistaxis. Right sinus exhibits no maxillary sinus tenderness and no frontal sinus tenderness. Left sinus exhibits no maxillary sinus tenderness and no frontal sinus tenderness.   Mouth/Throat: Uvula is midline, oropharynx is clear and moist and mucous membranes are normal. No trismus in the jaw. Normal dentition. No uvula swelling. No posterior oropharyngeal erythema.   Eyes: Conjunctivae and lids are normal. Right eye exhibits no discharge. Left eye exhibits no discharge. No scleral icterus.   Sclera clear bilat   Neck: Trachea normal, normal range of motion, full passive range of motion without pain and phonation normal. Neck supple.   Cardiovascular: Normal rate, regular rhythm, normal heart sounds, intact distal pulses and normal pulses.   Pulmonary/Chest: Effort normal and breath sounds normal. No respiratory distress.   Abdominal: Soft. Normal appearance and bowel sounds are normal. She exhibits no distension, no pulsatile midline mass and no mass. There is no tenderness.   Musculoskeletal: Normal range of motion. She exhibits no edema or deformity.   Neurological: She is alert and oriented to person, place, and time. She exhibits normal muscle tone. Coordination normal.   Skin: Skin is warm, dry and intact. She is not diaphoretic. No pallor.   Psychiatric: She has a normal mood and affect. Her speech is normal and behavior is normal. Judgment and thought content normal. Cognition and memory are normal.   Nursing note and  vitals reviewed.      Assessment:       1. Urinary tract infection without hematuria, site unspecified    2. Urinary frequency        Plan:         Urinary tract infection without hematuria, site unspecified  -     POCT Urinalysis, Dipstick, Automated, W/O Scope  -     POCT Glucose, Hand-Held Device  -     nitrofurantoin, macrocrystal-monohydrate, (MACROBID) 100 MG capsule; Take 1 capsule (100 mg total) by mouth 2 (two) times daily. for 7 days  Dispense: 14 capsule; Refill: 0  -     phenazopyridine (PYRIDIUM) 200 MG tablet; Take 1 tablet (200 mg total) by mouth 3 (three) times daily as needed for Pain.  Dispense: 6 tablet; Refill: 0  -     fluconazole (DIFLUCAN) 150 MG Tab; Take 1 tablet (150 mg total) by mouth once daily. for 1 day  Dispense: 1 tablet; Refill: 0  -     Culture, Urine    Urinary frequency  -     POCT Urinalysis, Dipstick, Automated, W/O Scope  -     POCT Glucose, Hand-Held Device

## 2019-01-01 LAB
BACTERIA UR CULT: ABNORMAL
BACTERIA UR CULT: ABNORMAL
OTHER ANTIBIOTIC SUSC ISLT: ABNORMAL

## 2019-02-20 ENCOUNTER — TELEPHONE (OUTPATIENT)
Dept: BARIATRICS | Facility: CLINIC | Age: 49
End: 2019-02-20

## 2019-02-20 NOTE — TELEPHONE ENCOUNTER
called and spoke to Ms Lockhart to let her know the day she had schedule to see Dr Salmon will not be here, but I did offer to move to to Friday march 8th with the same time patient stated she can not do that date because they are short staff at work and  she will have to call to reschedule.

## 2019-02-28 ENCOUNTER — OFFICE VISIT (OUTPATIENT)
Dept: BARIATRICS | Facility: CLINIC | Age: 49
End: 2019-02-28
Payer: COMMERCIAL

## 2019-02-28 VITALS
WEIGHT: 200.19 LBS | HEIGHT: 65 IN | SYSTOLIC BLOOD PRESSURE: 140 MMHG | BODY MASS INDEX: 33.35 KG/M2 | DIASTOLIC BLOOD PRESSURE: 70 MMHG | HEART RATE: 86 BPM

## 2019-02-28 DIAGNOSIS — Z98.84 S/P LAPAROSCOPIC SLEEVE GASTRECTOMY: ICD-10-CM

## 2019-02-28 DIAGNOSIS — E66.9 OBESITY, CLASS I, BMI 30.0-34.9 (SEE ACTUAL BMI): Primary | ICD-10-CM

## 2019-02-28 DIAGNOSIS — K21.9 GASTROESOPHAGEAL REFLUX DISEASE, ESOPHAGITIS PRESENCE NOT SPECIFIED: ICD-10-CM

## 2019-02-28 PROCEDURE — 99999 PR PBB SHADOW E&M-EST. PATIENT-LVL III: ICD-10-PCS | Mod: PBBFAC,,, | Performed by: INTERNAL MEDICINE

## 2019-02-28 PROCEDURE — 99999 PR PBB SHADOW E&M-EST. PATIENT-LVL III: CPT | Mod: PBBFAC,,, | Performed by: INTERNAL MEDICINE

## 2019-02-28 PROCEDURE — 99214 PR OFFICE/OUTPT VISIT, EST, LEVL IV, 30-39 MIN: ICD-10-PCS | Mod: S$GLB,,, | Performed by: INTERNAL MEDICINE

## 2019-02-28 PROCEDURE — 99214 OFFICE O/P EST MOD 30 MIN: CPT | Mod: S$GLB,,, | Performed by: INTERNAL MEDICINE

## 2019-02-28 NOTE — PROGRESS NOTES
"Subjective:       Patient ID: Meg Lockhart is a 48 y.o. female.    Chief Complaint: Follow-up    Pt here today for follow-up. Has lost 6 lbs. Denies SE with Ozempic. Does feel she has restriction back. BS has been 150-160s in the morning. No recent A1c. Doing better with her diet.     We did touch base with her about the GERD sx and she was doing, ok, but then she states it got much worse. She is still on the PPI, H2 blocker and carafate. Some times to the point of acid coming up her throat. She did not call us just because her daughters have been dx with DM1 and she has been very taxed with that. 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       Review of Systems   Constitutional: Positive for diaphoresis. Negative for chills and fever.   Respiratory: Positive for shortness of breath.         + snores. Denies H/o CHI   Cardiovascular: Negative for chest pain and leg swelling.   Gastrointestinal: Positive for nausea and vomiting.        + gerd   Endocrine: Positive for cold intolerance.   Genitourinary: Negative for difficulty urinating.        S/p hyst   Musculoskeletal: Positive for arthralgias and back pain.   Neurological: Positive for dizziness and headaches. Negative for light-headedness.   Psychiatric/Behavioral: Positive for decreased concentration. Negative for dysphoric mood. The patient is nervous/anxious.        Objective:     BP (!) 140/70   Pulse 86   Ht 5' 5" (1.651 m)   Wt 90.8 kg (200 lb 2.8 oz)   BMI 33.31 kg/m²     Physical Exam   Constitutional: She is oriented to person, place, and time. She appears well-developed and well-nourished. No distress.   obese   HENT:   Head: Normocephalic and atraumatic.   Eyes: EOM are normal. Pupils are equal, round, and reactive to light. No scleral icterus.   Neck: Normal range of motion. Neck supple. No thyromegaly present.   Cardiovascular: Normal rate and normal heart sounds. Exam reveals no gallop and no friction rub.   No murmur " heard.  Pulmonary/Chest: Effort normal and breath sounds normal. No respiratory distress. She has no wheezes.   Abdominal: Soft. Bowel sounds are normal. She exhibits no distension. There is tenderness.   Musculoskeletal: Normal range of motion. She exhibits no edema.   Neurological: She is alert and oriented to person, place, and time. No cranial nerve deficit.   Skin: Skin is warm and dry. No erythema.   Psychiatric: She has a normal mood and affect. Her behavior is normal. Judgment normal.   Vitals reviewed.      Assessment:       1. Obesity, Class I, BMI 30.0-34.9 (see actual BMI)    2. Gastroesophageal reflux disease, esophagitis presence not specified    3. Type 2 diabetes, uncontrolled, with neuropathy    4. S/P laparoscopic sleeve gastrectomy        Plan:         Meg was seen today for follow-up.    Diagnoses and all orders for this visit:    Obesity, Class I, BMI 30.0-34.9 (see actual BMI)    Gastroesophageal reflux disease, esophagitis presence not specified  -     Case request GI: ESOPHAGOGASTRODUODENOSCOPY (EGD)    Type 2 diabetes, uncontrolled, with neuropathy  -     semaglutide (OZEMPIC) 1 mg/0.75 mL (2 mg/1.5 mL) PnIj; Inject 1 mg subq weekly    S/P laparoscopic sleeve gastrectomy  -     Case request GI: ESOPHAGOGASTRODUODENOSCOPY (EGD)           [phone] cynthia pescetarian menu and healthy all day tips given.     Continue bariatric diet for life.           25 min face to face time.

## 2019-02-28 NOTE — PATIENT INSTRUCTIONS
Start Ozempic 1 mg weekly.     - To lose weight you want to cut 100% starchy carbohydrates out of your diet (bread, rice, pasta, potatoes, granola, flour, corn, peas, oatmeal, grits, tortillas, crackers, chips) and get  grams of protein.  Aim for 100 grams of protein daily.    - No soda, sweet tea, juices, sports drinks or lemonade     -Exercise daily    - Premier Protein (Chocolate, Bananas & Cream, Strawberries & Cream, Vanilla) Chava or [employer]co    - Syntrax [place] from [employer], www.bariatricadvantage.com, www.bariatricchoice.com. (LACTOSE FREE)    - Flashstartsro - Amazon.com (LACTOSE FREE)    - Veggetti Pro from [employer], [employer], [employer] Beyond    - www.pinterest.com (cauliflower, cloud bread, quest bar cookies, eggplant, zucchini, zucchini noodles, crustless quiche, no carb meals, taco lettuce boats)    - http://[email or web address].[email or web address]/      5-Day Menu Plan: 800-1000 Calories    DAY 1     Breakfast  1 boiled egg  Small apple    Snack  ¼ cup almonds    Lunch  Morning star samantha  1 cup green beans    Dinner  3 oz salmon  1 cup cooked carrots    Snack  SF jello    DAY 2    Breakfast  2 eggs with 2 tbsp salsa    Snack  6 oz greek yogurt  ½ cup Peaches canned (in its own juice)    Lunch  [place]:Tuna and mustard   Pear cup (no sugar added)    Dinner  Baked fish  1 cup cooked yellow squash    Snack  SF popsicle            DAY 3    Breakfast   Protein drink: 1 scoop whey powder + 6oz soy milk    Snack  ¼ cup almonds    Lunch  Tuna Salad: 3oz canned tuna in water, 1 egg, and 1 tsp light putnam)  Pineapple cup (no sugar added)    Dinner  Baked shrimp  1 cup grilled eggplant    Snack  8oz Chocolate Soy Milk      DAY 4    Breakfast  2 eggs, morning star samantha    Snack  1 cheese stick    Lunch    Snack  1/4 cup almonds  Peach cup (no sugar added)    Dinner  3oz baked fish  1 cup cooked green beans          DAY 5    Breakfast  Protein drink: 1 scoop whey powder + 6oz soy milk    Snack   ¼ cup almonds  1  apple    Lunch  1 cup tuna salad: (3oz canned tuna in water, 1 egg, 1 tsp light putnam)    Snack  3 oz greek yogurt  Fruit cup (no sugar added)    Dinner  Omelet: 2 eggs, grilled shrimp, bell pepper and onion          Eating well to be healthy and lose weight does not have to be hard. It also does not have to be time consuming or expensive. There a lots of ways you can work in healthy choices into your day. Many of these are easy, quick and even family friendly!    Homemade hazelnut au lait  Brew your favorite brand of hazelnut flavored coffee (Causata makes a good one). Microwave 1/2 cup of milk that fits your eating plan (whole, skim or sugar-free almond milk can all work). Add half to 1 oz sugar free hazelnut syrup.     Quick and easy breakfast  1-2 boiled eggs or mini-frittatas with a tangerine. The boiled eggs and mini-frittatas can both be made ahead and last for up to 4 days in the refrigerator. Bonus if you portion them out in ready to go containers or zipper bags.     Breakfast Egg Muffins with Davidson and Spinach  Makes 12 muffins  Ingredients    6 eggs  ¼ cup milk  ¼ teaspoon salt  2 cups grated cheddar cheese  3/4 cup spinach, cooked and drained (about 8 oz fresh spinach)  6 davidson slices, cooked, drained of fat, and chopped  1/2 cup grated Parmesan cheese (optional)    Instructions      Preheat oven to 350 degrees. Use a regular 12-cup muffin pan. Spray the muffin pan with non-stick cooking spray.  In a large bowl, beat eggs until smooth. Add milk, salt, Cheddar cheese and mix. Stir spinach, cooked davidson into the egg mixture. Ladle the egg mixture into greased muffin cups ¾ full.  Top each muffin cup with grated Parmesan cheese.  Bake for 25 minutes. Remove from the oven, let the muffins cool for 30 minutes before removing them from the pan.      Be a brown bagger! When you make dinner, plan for an extra helping. When you serve your plate for dinner, serve an additional helping into a container that you  can take with you the next day. If you don't have a refrigerator available during the day, an insulated lunch bag and ice packs will help you safely store you lunch.     Cold Brewed Iced tea. Fill a pitcher with 64 oz filtered water. Add either 4 regular tea bags of your choice or a large iced tea bag. Refrigerate over night then remove the tea bags. The tea will not be bitter and is super flavorful. Get creative! Try combinations like green tea and hibiscus tea or black tea with lemon zinger. Add orange or lemon slices for even more flavor.     Snack wisely. Protein filled snacks will fill you up, allowing you to get by with fewer calories. String cheese, pork skins (chicharrones), turkey pepperoni, or celery with cream cheese will all fit the bill.       Ditch the take out. Turkey tacos (with or without a low carb tortilla), burgers (without the bun), or fun stir fries are all quick and easy. The whole family will be happy, and you can save calories and money.      Orange Chicken Stir malik with asparagus   Makes 6 servings  Ingredients:    1.5 lbs boneless skinless chicken breast/tenders, diced into 1-inch pieces  1 Tbsp extra virgin olive or avocado oil, divided  2 lb asparagus, end portions trimmed and remainder diced into 1 1/2-inch pieces  1 small yellow onion, sliced into thin strips  8 oz button mushrooms, sliced  1 Tbsp peeled and finely grated fresh rosalee  4 cloves garlic, minced  1/2 cup low-sodium chicken broth  Juice of 2 fresh oranges  2 Tbsp low sodium soy sauce  2 Tbsp cornstarch  Sea salt and freshly ground black pepper    Directions:    In a 12-inch non-stick wok, heat 1/2 oil over moderately high heat. Once oil is hot, add diced chicken and season lightly with salt and pepper. Sauté until cooked through, tossing occasionally, about 5-6 minutes.  Place chicken on a large plate and set aside. Return wok, reduce to medium-high heat, add remaining oil.  Once oil is hot, add asparagus, yellow onion and  mushrooms, and sauté until tender-crisp, about 4 - 5 minutes, adding in garlic and rosalee during the last 1 minute of sautéing.  Meanwhile, in a mixing bowl whisk together chicken broth, orange juice, soy sauce and cornstarch until well blended.  Pour chicken broth mixture into skillet with veggies, season with salt and pepper to taste, and bring mixture to a light boil, stirring constantly. Allow mixture to gently boil, stirring constantly, until thickened, about 1 minute.  Toss chicken into mixture and serve immediately over cauliflower rice or Shirataki noodles.      Skinny Chicken Tortilla Soup  Makes 7 servings    2 teaspoons olive oil  1 cup onion, chopped (about 1 small)  2 cups celery, sliced (about 4 medium stalks)  4 garlic cloves, minced  4 medium tomatoes, chopped  2 cups water  4 cups low-sodium organic chicken broth  3 cups chopped and/or shredded rotisserie chicken, skinless  2 cups sliced carrots (about 3 medium)  1 teaspoon dried oregano leaves  2 teaspoons chili powder  1 teaspoon garlic powder  2 teaspoons cumin  ½ teaspoon cayenne pepper (add less or omit, if you don't want a spicy soup)  ½ teaspoon sea salt + more to taste  ½ teaspoon pepper + more to taste    Directions:   Put all ingredients into a large crock pot. Cook on low for 5-6 hours.     Optional garnish with chopped avocado, chopped fresh cilantro, crumbled Cotija cheese, sour cream, Greek yogurt, your favorite hot sauce.           Vegan Avocado Banana Chocolate Pudding  Makes 4 servings  Ingredients    1 1/2 ripe avocados  2 ripe bananas  6 tbsp raw cacao powder or unsweetened cocoa powder  2-3 tbsp maple syrup (or calorie free sweetener)  1/4 cup almond milk  Instructions    Blend everything together in a  until the consistency is smooth and velvety. Taste and see if more sweetener is needed and stir to make sure everything is evenly mixed. Blend a second time if needed.  Top with banana slices, raw cacao nibs, almond  butter, or any other toppings and enjoy!

## 2019-03-01 ENCOUNTER — TELEPHONE (OUTPATIENT)
Dept: ENDOSCOPY | Facility: HOSPITAL | Age: 49
End: 2019-03-01

## 2019-03-12 ENCOUNTER — TELEPHONE (OUTPATIENT)
Dept: BARIATRICS | Facility: CLINIC | Age: 49
End: 2019-03-12

## 2019-03-12 NOTE — TELEPHONE ENCOUNTER
Returned pts call, she needs to cancel her EGD scheduled for the 15th due to a $1700 payment she has to pay before.

## 2019-03-12 NOTE — TELEPHONE ENCOUNTER
----- Message from Arminda Larios sent at 3/12/2019  1:09 PM CDT -----  Contact: Pt.Self   Needs Advice    Reason for call:  Pt. States she needs to cancel surgery date and time due to not being able to afford out of pocket expense         Communication Preference:  [phone]    Additional Information:    Thank You

## 2019-03-15 NOTE — TELEPHONE ENCOUNTER
Called pt again just to check if she wanted revisional sx cause then she would need the egd, pt said no

## 2019-06-17 ENCOUNTER — OFFICE VISIT (OUTPATIENT)
Dept: BARIATRICS | Facility: CLINIC | Age: 49
End: 2019-06-17
Payer: COMMERCIAL

## 2019-06-17 VITALS
HEART RATE: 72 BPM | WEIGHT: 204.38 LBS | HEIGHT: 65 IN | SYSTOLIC BLOOD PRESSURE: 138 MMHG | BODY MASS INDEX: 34.05 KG/M2 | DIASTOLIC BLOOD PRESSURE: 72 MMHG

## 2019-06-17 DIAGNOSIS — R06.83 SNORING: ICD-10-CM

## 2019-06-17 DIAGNOSIS — R51.9 CHRONIC DAILY HEADACHE: ICD-10-CM

## 2019-06-17 DIAGNOSIS — E66.9 OBESITY, CLASS I, BMI 30.0-34.9 (SEE ACTUAL BMI): Primary | ICD-10-CM

## 2019-06-17 PROCEDURE — 99213 OFFICE O/P EST LOW 20 MIN: CPT | Mod: S$GLB,,, | Performed by: INTERNAL MEDICINE

## 2019-06-17 PROCEDURE — 99999 PR PBB SHADOW E&M-EST. PATIENT-LVL III: CPT | Mod: PBBFAC,,, | Performed by: INTERNAL MEDICINE

## 2019-06-17 PROCEDURE — 99213 PR OFFICE/OUTPT VISIT, EST, LEVL III, 20-29 MIN: ICD-10-PCS | Mod: S$GLB,,, | Performed by: INTERNAL MEDICINE

## 2019-06-17 PROCEDURE — 99999 PR PBB SHADOW E&M-EST. PATIENT-LVL III: ICD-10-PCS | Mod: PBBFAC,,, | Performed by: INTERNAL MEDICINE

## 2019-06-17 RX ORDER — ROSUVASTATIN CALCIUM 5 MG/1
5 TABLET, COATED ORAL
COMMUNITY
End: 2022-02-25

## 2019-06-17 RX ORDER — METFORMIN HYDROCHLORIDE 500 MG/1
500 TABLET ORAL
COMMUNITY

## 2019-06-17 NOTE — PROGRESS NOTES
"Subjective:       Patient ID: Meg Lockhart is a 49 y.o. female.    Chief Complaint: Follow-up    Pt here today for follow-up. Has gained 4 lbs. Denies SE with Ozempic. Does feel she has restriction back. Has been eating eating poorly. Has been on the  Her  BS has been 130-150s in the morning.  Was drinking nicole sugary drinks. Needs to see her PCP for labs.         Review of Systems   Constitutional: Positive for diaphoresis. Negative for chills and fever.   Respiratory: Positive for shortness of breath.         + snores. Denies H/o CHI   Cardiovascular: Negative for chest pain and leg swelling.   Gastrointestinal: Positive for nausea and vomiting.        + gerd   Endocrine: Positive for cold intolerance.   Genitourinary: Negative for difficulty urinating.        S/p hyst   Musculoskeletal: Positive for arthralgias and back pain.   Neurological: Positive for dizziness and headaches. Negative for light-headedness.   Psychiatric/Behavioral: Positive for decreased concentration. Negative for dysphoric mood. The patient is nervous/anxious.        Objective:     /72   Pulse 72   Ht 5' 5" (1.651 m)   Wt 92.7 kg (204 lb 5.9 oz)   BMI 34.01 kg/m²     Physical Exam   Constitutional: She is oriented to person, place, and time. She appears well-developed and well-nourished. No distress.   obese   HENT:   Head: Normocephalic and atraumatic.   Eyes: Pupils are equal, round, and reactive to light. EOM are normal. No scleral icterus.   Neck: Normal range of motion. Neck supple.   Cardiovascular: Normal rate.   Pulmonary/Chest: Effort normal.   Musculoskeletal: Normal range of motion. She exhibits no edema.   Neurological: She is alert and oriented to person, place, and time. No cranial nerve deficit.   Skin: Skin is warm and dry. No erythema.   Psychiatric: She has a normal mood and affect. Her behavior is normal. Judgment normal.   Vitals reviewed.      Assessment:       1. Obesity, Class I, BMI 30.0-34.9 (see " actual BMI)    2. Type 2 diabetes, uncontrolled, with neuropathy    3. Snoring    4. Chronic daily headache        Plan:         Meg was seen today for follow-up.    Diagnoses and all orders for this visit:    Obesity, Class I, BMI 30.0-34.9 (see actual BMI)  -     Ambulatory referral to Sleep Disorders    Type 2 diabetes, uncontrolled, with neuropathy  -     semaglutide (OZEMPIC) 1 mg/dose (2 mg/1.5 mL) PnIj; Inject 1 mg subq weekly    Snoring  -     Ambulatory referral to Sleep Disorders    Chronic daily headache  -     Ambulatory referral to Sleep Disorders           [phone] cynthia  menu and Roxana lorena recipes given.     Continue bariatric diet for life.       Nutrition materials provided today.

## 2019-06-17 NOTE — PATIENT INSTRUCTIONS
Ozempic 1 mg.     - To lose weight you want to cut 100% starchy carbohydrates out of your diet (bread, rice, pasta, potatoes, granola, flour, corn, peas, oatmeal, grits, tortillas, crackers, chips) and get  grams of protein.  Aim for 100 grams of protein daily.    - No soda, sweet tea, juices, sports drinks or lemonade     -Exercise daily    - Premier Protein (Chocolate, Bananas & Cream, Strawberries & Cream, Vanilla) Chava or [employer]    - Syntrax [place] from [employer], www.bariatricadvantage.com, www.bariatricchoice.com. (LACTOSE FREE)    - H[employer]el - Amazon.com (LACTOSE FREE)    - Veggetti Pro from [employer], [employer], [employer] Beyond    - www.pinterest.com (cauliflower, cloud bread, quest bar cookies, eggplant, zucchini, zucchini noodles, crustless quiche, no carb meals, taco lettuce boats)    - [email or web address]      5-Day Menu Plan: 800-1000 Calories    DAY 1     Breakfast  4 slices deli turkey  Small apple    Snack  ¼ cup almonds    Lunch  Lean hamburger samantha  1 cup green beans    Dinner  2 skinless chicken thighs  1 cup cooked carrots    Snack  SF jello    DAY 2    Breakfast  2 eggs with 2 tbsp salsa    Snack  2 slices deli turkey  ½ cup Peaches canned (in its own juice)    Lunch  [place]: Deli chicken and mustard   Pear cup (no sugar added)    Dinner  Baked fish  1 cup cooked yellow squash    Snack  SF popsicle            DAY 3    Breakfast   Protein drink: 1 scoop whey powder + 6oz soy milk    Snack  ¼ cup almonds    Lunch  Tuna Salad: 3oz canned tuna in water, 1 egg, and 1 tsp light putnam)  Pineapple cup (no sugar added)    Dinner  Baked chicken breast  1 cup grilled eggplant    Snack  8oz Chocolate Soy Milk      DAY 4    Breakfast  2 eggs, turkey sausage samantha    Snack  4 slices deli turkey    Lunch    Snack  1/4 cup almonds  Peach cup (no sugar added)    Dinner  3oz baked pork chop  1 cup cooked green beans          DAY 5    Breakfast  Protein drink: 1 scoop whey  powder + 6oz soy milk    Snack   ¼ cup almonds  1 apple    Lunch  1 cup Chicken salad: (3oz canned chicken in water, 1 egg, 1 tsp light putnam)    Snack  4 slices deli turkey  Fruit cup (no sugar added)    Dinner  Omelet: 2 eggs, grilled shrimp, bell pepper and onion        January 28, 2019  Hot Spinach and Artichoke Dip (Recipe)  BY MISAEL JIANG RD, CSSD    This creamy spinach dip can double as a protein-rich, gluten-free side dish, game day appetizer or parade route snack.  Calories 85 calories    Fat 3 grams saturated fat    Prep Time 5 minutes  Cook Time10 minutes  Yield Serves 5-10 people  Ingredients   1/2 cup onion, finely chopped   3 (10 ounce) packs of frozen chopped spinach, thawed and squeezed dry   1 (8 ounce) package reduced-fat cream cheese   1 (8 ounce) carton fat-free plain Greek yogurt   1/2 cup Parmesan cheese, grated   1 (14 ounce) can artichoke hearts   1/4 teaspoon salt (optional)   1/4 teaspoon black pepper   1/8 teaspoon crushed red pepper flakes  Instructions  1. Lightly coat a skillet with cooking spray.  2. Cook and stir onion over medium heat until transparent (about 5 minutes).  3. Add spinach. Cook until thoroughly heated (about 1-2 minutes).  4. Reduce heat and add cream cheese. Stir until melted and smooth.  5. Stir in Greek yogurt, Parmesan cheese and artichokes. Remove from heat.  6. Season with salt (optional) and black and red pepper.  7. Serve with raw vegetables.                          January 31, 2019  Pizza Cups (Recipe)    Trying to eat better but craving pizza? These protein-packed pizza cups will do the trick.    Calories 65 calories per cup  Fat 2.7 grams per cup  Prep Time5 minutes  Cook Time 25 minutes  Yield 12 pizza cups  Ingredients   1 ½ cups liquid egg whites   2 large eggs   1 cup fat free or low moisture shredded mozzarella cheese   37 turkey pepperonis (25 chopped and 12 sliced)   ¼ cup Parmesan cheese   ¼ tsp oregano   ¼ tsp black  pepper  Instructions  1. Preheat oven to 350 degrees Fahrenheit.  2. Chop up 25 turkey pepperonis into small pieces. In a bowl, combine all ingredients except the remaining 12 sliced turkey pepperoni.  3. Spray a muffin puente with nonstick spray.  4. Place a whole sliced turkey pepperoni in the bottom of each of the 12 muffin molds.  5. Pour or spoon in the mixture in your bowl into each muffin mold evenly ¾ full.  6. Bake in the oven for 20-25 minutes. Place a toothpick in the center of the pizza cup to ensure all liquid egg has cooked. For a crispier pizza cup, leave in the oven a little longer.  7. Let cool for a few minutes before serving. Enjoy!        Eating Protein after Bariatric Surgery  After having Bariatric Surgery it can get confusing which foods are the best to eat, especially when it comes to getting in enough protein when you are on the go. Here are a few ideas on how to get in the best quality of protein when youre having a busy day.    Protein bars can be a great way to get in the calories and protein you need. NOTICE: Protein bars are high in calories and should be used as a meal replacement. You should purchase protein bars with at least 20g of protein and no more then 4g of sugar.                                    A few bars that meet these guidelines are:    Product Name Serving Size Calories Protein Sugar   Atkins Advantage 1 bar (1.6 oz) 150-200 kcal 10-15g protein 1g sugar   Pure Protein 1  bar   (1.76 oz) 180 kcal   19g protein 2g sugar   Think Thin 1 bar     (2.1 oz) 240 kcal 20g protein 0g sugar   EAS Myoplex Carb Control 1 bar    (2.46 oz ) 260 kcal 25g protein 1g sugar   Power Bar Protein Plus Reduced Sugar 1 bar     (2.57 oz) 270 kcal 22g protein 1g sugar   Protein Revolution 1 bar       (2.75 oz) 280 kcal 32g protein 2g sugar   MET-RX Protein Plus 1 bar     (3.0 oz) 300 kcal 32g protein 3g sugar   Pure Protein 1 bar    (2.75 oz) 310 kcal 31g protein 3g sugar   Detour Lean Muscle 1 bar      (3.2 oz) 370 kcal 32g protein, 3g sugar

## 2019-07-16 ENCOUNTER — TELEPHONE (OUTPATIENT)
Dept: BARIATRICS | Facility: CLINIC | Age: 49
End: 2019-07-16

## 2019-07-16 NOTE — TELEPHONE ENCOUNTER
Spoke with pt to reschedule appointment with sleep clinic. Pt states that she does not currently have transportation and will call to reschedule when her car is fixed.     Leonie Ravi MA  [facility] Referral Coordinator   [phone]

## 2019-08-12 ENCOUNTER — OFFICE VISIT (OUTPATIENT)
Dept: URGENT CARE | Facility: CLINIC | Age: 49
End: 2019-08-12
Payer: COMMERCIAL

## 2019-08-12 VITALS
HEART RATE: 91 BPM | SYSTOLIC BLOOD PRESSURE: 157 MMHG | BODY MASS INDEX: 34.82 KG/M2 | DIASTOLIC BLOOD PRESSURE: 97 MMHG | RESPIRATION RATE: 16 BRPM | WEIGHT: 209 LBS | OXYGEN SATURATION: 99 % | HEIGHT: 65 IN | TEMPERATURE: 97 F

## 2019-08-12 DIAGNOSIS — R31.9 HEMATURIA, UNSPECIFIED TYPE: ICD-10-CM

## 2019-08-12 DIAGNOSIS — R39.15 URINARY URGENCY: Primary | ICD-10-CM

## 2019-08-12 LAB
BILIRUB UR QL STRIP: NEGATIVE
GLUCOSE SERPL-MCNC: 409 MG/DL (ref 70–110)
GLUCOSE UR QL STRIP: POSITIVE
KETONES UR QL STRIP: NEGATIVE
LEUKOCYTE ESTERASE UR QL STRIP: NEGATIVE
PH, POC UA: 5.5
POC BLOOD, URINE: POSITIVE
POC NITRATES, URINE: NEGATIVE
PROT UR QL STRIP: NEGATIVE
SP GR UR STRIP: 1.01 (ref 1–1.03)
UROBILINOGEN UR STRIP-ACNC: NORMAL (ref 0.1–1.1)

## 2019-08-12 PROCEDURE — 82962 GLUCOSE BLOOD TEST: CPT | Mod: ,,, | Performed by: NURSE PRACTITIONER

## 2019-08-12 PROCEDURE — 81003 URINALYSIS AUTO W/O SCOPE: CPT | Mod: QW,S$GLB,, | Performed by: NURSE PRACTITIONER

## 2019-08-12 PROCEDURE — 99214 OFFICE O/P EST MOD 30 MIN: CPT | Mod: 25,S$GLB,, | Performed by: NURSE PRACTITIONER

## 2019-08-12 PROCEDURE — 99214 PR OFFICE/OUTPT VISIT, EST, LEVL IV, 30-39 MIN: ICD-10-PCS | Mod: 25,S$GLB,, | Performed by: NURSE PRACTITIONER

## 2019-08-12 PROCEDURE — 81003 POCT URINALYSIS, DIPSTICK, AUTOMATED, W/O SCOPE: ICD-10-PCS | Mod: QW,S$GLB,, | Performed by: NURSE PRACTITIONER

## 2019-08-12 PROCEDURE — 82962 POCT GLUCOSE, HAND-HELD DEVICE: ICD-10-PCS | Mod: ,,, | Performed by: NURSE PRACTITIONER

## 2019-08-12 RX ORDER — CEPHALEXIN 500 MG/1
500 CAPSULE ORAL EVERY 12 HOURS
Qty: 20 CAPSULE | Refills: 0 | Status: SHIPPED | OUTPATIENT
Start: 2019-08-12 | End: 2019-08-22

## 2019-08-12 NOTE — PROGRESS NOTES
"Subjective:       Patient ID: Meg Lockhart is a 49 y.o. female.    Vitals:  height is 5' 5" (1.651 m) and weight is 94.8 kg (209 lb). Her temperature is 97.4 °F (36.3 °C). Her blood pressure is 157/97 (abnormal) and her pulse is 91. Her respiration is 16 and oxygen saturation is 99%.     Chief Complaint: Hematuria    Pt presents with urinary frequency x 2 wks. For the past 2 days she has been experiencing urinary urgency that has progressively worsened. She has h/o DM and reports she ran out of her meds. She saw her PCP yesterday and filled all her scripts today. She denies polydipsia. She reports bladder pressure that is mod severity. She denies f/c/n/v.       Hematuria   This is a new problem. The current episode started yesterday. She describes her urine color as bright red. Irritative symptoms include frequency and urgency. Pertinent negatives include no abdominal pain, chills, dysuria, fever, nausea or vomiting. (Pressure  ) She is sexually active. Her past medical history is significant for kidney stones.       Constitution: Negative for chills and fever.   Neck: Negative for painful lymph nodes.   Gastrointestinal: Negative for abdominal pain, nausea and vomiting.   Genitourinary: Positive for frequency, urgency, bladder incontinence and hematuria. Negative for dysuria, urine decreased, history of kidney stones, painful menstruation, irregular menstruation, missed menses, heavy menstrual bleeding, ovarian cysts, genital trauma, vaginal pain, vaginal discharge, vaginal bleeding, vaginal odor, painful intercourse, genital sore, painful ejaculation and pelvic pain.   Musculoskeletal: Negative for back pain.   Skin: Negative for rash and lesion.   Hematologic/Lymphatic: Negative for swollen lymph nodes.       Objective:      Physical Exam   Constitutional: She is oriented to person, place, and time. She appears well-developed and well-nourished.   HENT:   Head: Normocephalic and atraumatic.   Right Ear: " External ear normal.   Left Ear: External ear normal.   Nose: Nose normal. No nasal deformity. No epistaxis.   Mouth/Throat: Oropharynx is clear and moist and mucous membranes are normal.   Eyes: Conjunctivae and lids are normal.   Neck: Trachea normal, normal range of motion and phonation normal. Neck supple.   Cardiovascular: Normal rate, regular rhythm, normal heart sounds and normal pulses.   Pulmonary/Chest: Effort normal and breath sounds normal.   Abdominal: Soft. Normal appearance and bowel sounds are normal. She exhibits no distension and no mass. There is no tenderness. There is no CVA tenderness.   Genitourinary:   Genitourinary Comments: No CVA tenderness, +suprapubic tenderness   Neurological: She is alert and oriented to person, place, and time.   Skin: Skin is warm, dry and intact.   Psychiatric: She has a normal mood and affect. Her speech is normal and behavior is normal. Cognition and memory are normal.   Nursing note and vitals reviewed.      Assessment:       1. Urinary urgency    2. Hematuria, unspecified type        Plan:         Urinary urgency  -     POCT Urinalysis, Dipstick, Automated, W/O Scope  -     POCT Glucose, Hand-Held Device  -     Culture, Urine    Hematuria, unspecified type    Other orders  -     cephALEXin (KEFLEX) 500 MG capsule; Take 1 capsule (500 mg total) by mouth every 12 (twelve) hours. for 10 days  Dispense: 20 capsule; Refill: 0    pt states she has taken keflex in the past without adverse reaction

## 2019-08-13 NOTE — PATIENT INSTRUCTIONS
Blood in the Urine    Blood in the urine (hematuria) has many possible causes. If it occurs after an injury (such as a car accident or fall), it is most often a sign of bruising to the kidney or bladder. Common causes of blood in the urine include urinary tract infections, kidney stones, inflammation, tumors, or certain other diseases of the kidney or bladder. Menstruation can cause blood to appear in the urine sample, although it is not coming from the urinary tract.  If only a trace amount of blood is present, it will show up on the urine test, even though the urine may be yellow and not pink or red. This may occur with any of the above conditions, as well as heavy exercise or high fever. In this case, your doctor may want to repeat the urine test on another day. This will show if the blood is still present. If it is, then other tests can be done to find out the cause.  Home care  Follow these home care guidelines:  · If your urine does not appear bloody (pink, brown or red) then you do not need to restrict your activity in any way.  · If you can see blood in your urine, rest and avoid heavy exertion until your next exam. Do not use aspirin, blood thinners, or anti-platelet or anti-inflammatory medicines. These include ibuprofen and naproxen. These thin the blood and may increase bleeding.  Follow-up care  Follow up with your healthcare provider, or as advised. If you were injured and had blood in your urine, you should have a repeat urine test in 1 to 2 days. Contact your doctor for this test.  A radiologist will review any X-rays that were taken. You will be told of any new findings that may affect your care.  When to seek medical advice  Call your healthcare provider right away if any of these occur:  · Bright red blood or blood clots in the urine (if you did not have this before)  · Weakness, dizziness or fainting  · New groin, abdominal, or back pain  · Fever of 100.4ºF (38ºC) or higher, or as directed by  your healthcare provider  · Repeated vomiting  · Bleeding from the nose or gums or easy bruising  Date Last Reviewed: 9/1/2016  © 5313-7550 [employer]. [address] [place] [address], [place] [address]. All rights reserved. This information is not intended as a substitute for professional medical care. Always follow your healthcare professional's instructions.

## 2019-08-17 LAB
BACTERIA UR CULT: NO GROWTH
BACTERIA UR CULT: NORMAL

## 2019-08-26 ENCOUNTER — TELEPHONE (OUTPATIENT)
Dept: URGENT CARE | Facility: CLINIC | Age: 49
End: 2019-08-26

## 2019-08-26 NOTE — TELEPHONE ENCOUNTER
----- Message from Carmencita Knapp NP sent at 8/25/2019 11:02 PM CDT -----  Please contact patient and inform her that there was no growth on her urine culture. She can stop the Keflex if she is still taking it. Will need to follow up with PCP or urology for follow up due to blood in urine.

## 2019-08-28 ENCOUNTER — TELEPHONE (OUTPATIENT)
Dept: ENDOSCOPY | Facility: HOSPITAL | Age: 49
End: 2019-08-28

## 2019-08-28 NOTE — TELEPHONE ENCOUNTER
Patient has order for EGD. Patient stated out of pocket expense would be $700. Patient cannot afford and the plan is to speak with her primary care physician about other alternatives.

## 2019-09-04 ENCOUNTER — HOSPITAL ENCOUNTER (EMERGENCY)
Facility: HOSPITAL | Age: 49
Discharge: HOME | End: 2019-09-04
Attending: EMERGENCY MEDICINE
Payer: COMMERCIAL

## 2019-09-04 VITALS
BODY MASS INDEX: 33.49 KG/M2 | HEIGHT: 65 IN | OXYGEN SATURATION: 99 % | SYSTOLIC BLOOD PRESSURE: 163 MMHG | RESPIRATION RATE: 18 BRPM | DIASTOLIC BLOOD PRESSURE: 94 MMHG | TEMPERATURE: 98 F | WEIGHT: 201 LBS | HEART RATE: 83 BPM

## 2019-09-04 DIAGNOSIS — R20.2 NUMBNESS AND TINGLING OF RIGHT LEG: ICD-10-CM

## 2019-09-04 DIAGNOSIS — M77.31 HEEL SPUR, RIGHT: ICD-10-CM

## 2019-09-04 DIAGNOSIS — M25.569 KNEE PAIN: ICD-10-CM

## 2019-09-04 DIAGNOSIS — R20.0 NUMBNESS AND TINGLING OF RIGHT LEG: ICD-10-CM

## 2019-09-04 DIAGNOSIS — S83.001A SUBLUXATION OF RIGHT PATELLA, INITIAL ENCOUNTER: Primary | ICD-10-CM

## 2019-09-04 PROCEDURE — 99283 EMERGENCY DEPT VISIT LOW MDM: CPT | Mod: 25

## 2019-09-04 NOTE — ED PROVIDER NOTES
Encounter Date: 9/4/2019    SCRIBE #1 NOTE: I, Rhett Sandoval, jasper scribing for, and in the presence of, Ruslan Dietrich III, MD.       History     Chief Complaint   Patient presents with    Numbness     history of DM, dropped a mirrow on her toes, now the whole leg went numb and painful       Time seen by provider: 5:59 PM on 09/04/2019    Meg Lockhart is a 49 y.o. female who presents to the ED with a sudden onset of constant right lower leg numbness and aching pain extending below the knee and to her foot since this morning. She states that the sx began after dropping a mirror on her toes. She also c/o her knee cap popping out of place while walking. The pt denies any other sx at this time, including abd pain. PMHx of DM and HTN. PSHx of hysterectomy. Allergic to Pcn, Sulfa, and Meperidine.    The history is provided by the patient.     Review of patient's allergies indicates:   Allergen Reactions    Penicillins Itching and Anaphylaxis    Sulfa (sulfonamide antibiotics) Anaphylaxis    Meperidine Itching     Past Medical History:   Diagnosis Date    Anxiety     Diabetes mellitus     Elevated cholesterol     GERD (gastroesophageal reflux disease)     Hypertension     PUD (peptic ulcer disease)      Past Surgical History:   Procedure Laterality Date    GASTRIC BYPASS      HYSTERECTOMY       Family History   Problem Relation Age of Onset    Heart disease Mother     Hypertension Mother     Stroke Brother     Cancer Maternal Aunt     Cancer Maternal Grandmother         lung    Heart disease Maternal Grandmother     Diabetes Paternal Grandmother     Heart disease Paternal Grandmother     Diabetes Paternal Grandfather      Social History     Tobacco Use    Smoking status: Never Smoker   Substance Use Topics    Alcohol use: No    Drug use: No     Review of Systems   Constitutional: Negative for activity change, appetite change, chills, fatigue and fever.   Eyes: Negative for visual disturbance.    Respiratory: Negative for apnea and shortness of breath.    Cardiovascular: Negative for chest pain and palpitations.   Gastrointestinal: Negative for abdominal distention and abdominal pain.   Genitourinary: Negative for difficulty urinating.   Musculoskeletal: Positive for myalgias (Right lower leg from knee down, constant). Negative for joint swelling and neck pain.   Skin: Negative for pallor, rash and wound.   Neurological: Positive for numbness (Right lower leg from knee down, constant). Negative for syncope and headaches.   Hematological: Does not bruise/bleed easily.   Psychiatric/Behavioral: Negative for agitation.       Physical Exam     Initial Vitals [09/04/19 1735]   BP Pulse Resp Temp SpO2   (!) 177/93 78 18 97.9 °F (36.6 °C) 99 %      MAP       --         Physical Exam    Nursing note and vitals reviewed.  Constitutional: She appears well-developed and well-nourished.   HENT:   Head: Normocephalic and atraumatic.   Eyes: Conjunctivae are normal.   Neck: Normal range of motion. Neck supple.   Cardiovascular: Normal rate, regular rhythm and normal heart sounds. Exam reveals no gallop and no friction rub.    No murmur heard.  Pulses:       Femoral pulses are 2+ on the right side.  Normal right femoral pulse present.   Pulmonary/Chest: Effort normal and breath sounds normal. No respiratory distress. She has no wheezes. She has no rhonchi. She has no rales.   Abdominal: Soft. She exhibits no distension and no pulsatile midline mass. There is no tenderness.   Abdomen soft and non-tender to palpation. No signs of pulsatile masses.   Musculoskeletal: Normal range of motion.   Neurological: She is alert and oriented to person, place, and time.   Skin: Skin is warm and dry. No erythema.   Psychiatric: She has a normal mood and affect.         ED Course   Procedures  Labs Reviewed - No data to display       Imaging Results          X-Ray Knee 3 View Right (In process)                  Medical Decision Making:    ED Management:  49-year-old female presents with circumferential right leg numbness from the knee distally after trauma to the toes.  It is difficult to arrive at a unifying diagnosis.  She is a diabetic with diabetic neuropathy a possibility.  The unilateral location and abrupt onset of symptoms makes this less likely.  She has insignificant swelling of the right leg without calf tenderness or calf pain with DVT unlikely.  She describes patella subluxation and is referred to Orthopedic surgery for management of same.  She is referred to neurology for further workup of this ongoing unilateral numbness.  Lumbar disc disease is unlikely due to the circumferential location and the symptoms that are exclusively below the knee.  Entrapment neuropathy remains a consideration as well.       APC / Resident Notes:   I, Dr. Ruslan Dietrich III, personally performed the services described in this documentation. All medical record entries made by the scribe were at my direction and in my presence.  I have reviewed the chart and agree that the record reflects my personal performance and is accurate and complete       Scribe Attestation:   Scribe #1: I performed the above scribed service and the documentation accurately describes the services I performed. I attest to the accuracy of the note.               Clinical Impression:       ICD-10-CM ICD-9-CM   1. Subluxation of right patella, initial encounter S83.001A 836.3   2. Knee pain M25.569 719.46   3. Numbness and tingling of right leg R20.0 782.0    R20.2    4. Heel spur, right M77.31 726.73         Disposition:   Disposition: Discharged  Condition: Stable                        Ruslan Dietrich III, MD  09/04/19 7454

## 2019-09-04 NOTE — ED NOTES
Pt in room 6 for evaluation of right leg numbness.  Pt is awake, alert and oriented.  Resp even and unlabored. Sarthak breath sounds clear.  Pt denies chest pain or sob. Abd soft and non-tender.  Pt reports pain and then numbness to right foot and leg up to knee after dropping mirror on foot about 2 weeks ago.  No bruising, redness or swelling noted.

## 2019-11-27 ENCOUNTER — OFFICE VISIT (OUTPATIENT)
Dept: URGENT CARE | Facility: CLINIC | Age: 49
End: 2019-11-27
Payer: COMMERCIAL

## 2019-11-27 VITALS
TEMPERATURE: 97 F | BODY MASS INDEX: 33.12 KG/M2 | SYSTOLIC BLOOD PRESSURE: 136 MMHG | RESPIRATION RATE: 16 BRPM | HEART RATE: 94 BPM | DIASTOLIC BLOOD PRESSURE: 91 MMHG | WEIGHT: 199 LBS | OXYGEN SATURATION: 98 %

## 2019-11-27 DIAGNOSIS — R11.2 NAUSEA, VOMITING, AND DIARRHEA: ICD-10-CM

## 2019-11-27 DIAGNOSIS — R19.7 NAUSEA, VOMITING, AND DIARRHEA: ICD-10-CM

## 2019-11-27 DIAGNOSIS — B34.9 VIRAL SYNDROME: Primary | ICD-10-CM

## 2019-11-27 PROCEDURE — 99214 PR OFFICE/OUTPT VISIT, EST, LEVL IV, 30-39 MIN: ICD-10-PCS | Mod: S$GLB,,, | Performed by: NURSE PRACTITIONER

## 2019-11-27 PROCEDURE — 99214 OFFICE O/P EST MOD 30 MIN: CPT | Mod: S$GLB,,, | Performed by: NURSE PRACTITIONER

## 2019-11-27 RX ORDER — LOSARTAN POTASSIUM 50 MG/1
50 TABLET ORAL EVERY MORNING
Refills: 1 | COMMUNITY
Start: 2019-09-02

## 2019-11-27 RX ORDER — ONDANSETRON 8 MG/1
8 TABLET, ORALLY DISINTEGRATING ORAL EVERY 8 HOURS PRN
Qty: 9 TABLET | Refills: 0 | Status: SHIPPED | OUTPATIENT
Start: 2019-11-27 | End: 2019-11-30

## 2019-11-27 RX ORDER — AMLODIPINE BESYLATE 5 MG/1
5 TABLET ORAL
COMMUNITY
End: 2022-03-09

## 2019-11-27 RX ORDER — ROSUVASTATIN CALCIUM 10 MG/1
10 TABLET, COATED ORAL NIGHTLY
Refills: 1 | COMMUNITY
Start: 2019-09-02 | End: 2022-03-17

## 2019-11-27 RX ORDER — HYOSCYAMINE SULFATE 0.125 MG
125 TABLET ORAL EVERY 6 HOURS PRN
Qty: 20 TABLET | Refills: 0 | Status: SHIPPED | OUTPATIENT
Start: 2019-11-27 | End: 2019-12-02

## 2019-11-28 NOTE — PATIENT INSTRUCTIONS
"  Take your medications as prescribed. Take over the counter probiotics if you are having diarrhea. Follow up with your PCP if you are not improving in 3-5 days. Be sure to drink plenty of clear fluids to stay hydrated. Eat a bland diet. Return to the emergency department if you have vomiting despite medications, have no urine production or any other concerns. Refer to the additional material provided for further information.      Viral Syndrome (Adult)  A viral illness may cause a number of symptoms. The symptoms depend on the part of the body that the virus affects. If it settles in your nose, throat, and lungs, it may cause cough, sore throat, congestion, and sometimes headache. If it settles in your stomach and intestinal tract, it may cause vomiting and diarrhea. Sometimes it causes vague symptoms like "aching all over," feeling tired, loss of appetite, or fever.  A viral illness usually lasts 1 to 2 weeks, but sometimes it lasts longer. In some cases, a more serious infection can look like a viral syndrome in the first few days of the illness. You may need another exam and additional tests to know the difference. Watch for the warning signs listed below.  Home care  Follow these guidelines for taking care of yourself at home:  · If symptoms are severe, rest at home for the first 2 to 3 days.  · Stay away from cigarette smoke - both your smoke and the smoke from others.  · You may use over-the-counter acetaminophen or ibuprofen for fever, muscle aching, and headache, unless another medicine was prescribed for this. If you have chronic liver or kidney disease or ever had a stomach ulcer or GI bleeding, talk with your doctor before using these medicines. No one who is younger than 18 and ill with a fever should take aspirin. It may cause severe disease or death.  · Your appetite may be poor, so a light diet is fine. Avoid dehydration by drinking 8 to 12 8-ounce glasses of fluids each day. This may include water; " orange juice; lemonade; apple, grape, and cranberry juice; clear fruit drinks; electrolyte replacement and sports drinks; and decaffeinated teas and coffee. If you have been diagnosed with a kidney disease, ask your doctor how much and what types of fluids you should drink to prevent dehydration. If you have kidney disease, drinking too much fluid can cause it build up in the your body and be dangerous to your health.  · Over-the-counter remedies won't shorten the length of the illness but may be helpful for cough, sore throat; and nasal and sinus congestion. Don't use decongestants if you have high blood pressure.  Follow-up care  Follow up with your healthcare provider if you do not improve over the next week.  Call 911  Get emergency medical care if any of the following occur:  · Convulsion  · Feeling weak, dizzy, or like you are going to faint  · Chest pain, shortness of breath, wheezing, or difficulty breathing  When to seek medical advice  Call your healthcare provider right away if any of these occur:  · Cough with lots of colored sputum (mucus) or blood in your sputum  · Chest pain, shortness of breath, wheezing, or difficulty breathing  · Severe headache; face, neck, or ear pain  · Severe, constant pain in the lower right side of your belly (abdominal)  · Continued vomiting (cant keep liquids down)  · Frequent diarrhea (more than 5 times a day); blood (red or black color) or mucus in diarrhea  · Feeling weak, dizzy, or like you are going to faint  · Extreme thirst  · Fever of 100.4°F (38°C) or higher, or as directed by your healthcare provider  Date Last Reviewed: 9/25/2015  © 7556-8463 [employer]. [address] [place] [address]. All rights reserved. This information is not intended as a substitute for professional medical care. Always follow your healthcare professional's instructions.        Nonspecific Vomiting and Diarrhea (Adult)  Vomiting and diarrhea can have many causes,  including:  · Helping your body get rid of harmful substances   · Gastroenteritis caused by viruses, parasites, bacteria, or toxins.  · Allergy to or side effect of a food or medicine  · Severe stress or worry (anxiety)   · Other illnesses  · Pregnancy  It is often hard to pinpoint an exact cause, even with testing. Vomiting and diarrhea often go away within a day or two without problems. If they continue, though, they can lead to too much loss of fluid (dehydration). This can be serious if not treated.    Home care  Medications  · You may use acetaminophen or NSAID medicines like ibuprofen or naproxen to control fever, unless another medicine was prescribed. If you have chronic liver or kidney disease, talk with your healthcare provider before using these medicines. Also talk with your provider if you've had a stomach ulcer or gastrointestinal bleeding. Don't give aspirin to anyone under 18 years of age who is ill with a fever. Don't use NSAID medicines if you are already taking one for another condition (like arthritis) or are on aspirin (such as for heart disease or after a stroke)  · If medicines for diarrhea or vomiting were prescribed, take these only as directed. Never take these without a healthcare providers approval.  General care  · If symptoms are severe, rest at home for the next 24 hours, or until you are feeling better.  · Washing your hands with soap and water, or using alcohol-based hand  is the best way to stop the spread of infection. Wash your hands after touching anyone who is sick.  · Wash your hands after using the toilet and before meals. Clean the toilet after each use.  · Caffeine, tobacco, and alcohol can make the diarrhea, cramping, and pain worse. Remember, caffeine not only is in coffee, but also is in chocolate, some energy drinks, and teas.  Diet  · Water and clear liquids are important so you don't get dehydrated. Drink a small amount at a time. Don't guzzle down the  drinks. That may increase your nausea, make cramping worse, and cause the drinks to come back up.  · Sports drinks may also help. Make sure they are not too sugary, because this can sometimes make things worse. Also, don't drink beverages that are too acidic, like orange juice and grape juice.  · If you are very dehydrated, sports drinks aren't a good choice. They have too much sugar and not enough electrolytes. In this case, commercially available products called oral rehydration solutions are best.  Food  · Don't force yourself to eat, especially if you have cramps, diarrhea, or vomiting. Eat just a little at a time, and then wait a few minutes before you try to eat more.  · Don't eat fatty, greasy, spicy, or fried foods.  · Don't eat dairy products if you have diarrhea. They can make it worse.  During the first 24 hours (the first full day), follow the diet below:  · Beverages: Sports drinks, soft drinks without caffeine, mineral water, and decaffeinated tea and coffee  · Soups: Clear broth, consommé, and bouillon  · Desserts: Plain gelatin, popsicles, and fruit juice bars  During the next 24 hours (the second day), you may add the following to the above if you are better. If not, continue what you did the first day:  · Hot cereal, plain toast, bread, rolls, crackers  · Plain noodles, rice, mashed potatoes, chicken noodle or rice soup  · Unsweetened canned fruit (avoid pineapple), bananas  · Limit fat intake to less than 15 grams per day by avoiding margarine, butter, oils, mayonnaise, sauces, gravies, fried foods, peanut butter, meat, poultry, and fish.  · Limit fiber. Avoid raw or cooked vegetables, fresh fruits (except bananas) and bran cereals.  · Limit caffeine and chocolate. No spices or seasonings except salt.  During the next 24 hours:  · Gradually resume a normal diet, as you feel better and your symptoms improve.  · If at any time your symptoms start getting worse again, go back to clear liquids until  you feel better.  Food preparation  · If you have diarrhea, you should not prepare food for others. When preparing foods, wash your hands before and after.  · Wash your hands or use alcohol-based  after using cutting boards, countertops, and knives that have been in contact with raw food.  · Keep uncooked meats away from cooked and ready-to-eat foods.  Follow-up care  Follow up with your healthcare advisor, or as advised. Call if you do not get better in the next 2 to 3 days. If a stool (diarrhea) sample was taken, or cultures done, you will be told if they are positive, or if your treatment needs to be changed. You may call as directed for the results.  If X-rays were taken, and a radiologist has not yet looked at them, he or she will do so. You will be told if there is a change in the reading, especially if it affects your treatment.  Call 911  Call 911 if any of these occur:  · Trouble breathing  · Chest pain  · Confusion  · Severe drowsiness or trouble awakening  · Fainting or loss of consciousness  · Rapid heart rate  · Seizure  · Stiff neck  · Severe weakness, dizziness, or lightheadedness  When to seek medical advice  Call your healthcare provider right away if any of these occur:  · Bloody or black vomit or stools.  · Severe, steady abdominal pain or any abdominal pain that is getting worse.  · Severe headache or stiff neck  · An inability to hold down even sips of liquids for more than 12 hours.  · Vomiting that lasts more than 24 hours.  · Diarrhea that lasts more than 24 hours.  · Fever of 100.4°F (38.0°C) or higher that lasts more than 48 hours, or as directed by your health care provider  · Yellowish color to your skin or the whites of your eyes.  · Signs of dehydration, such as dry mouth, little urine (less than every 6 hours), or very dark urine.  Date Last Reviewed: 1/3/2016  © 9023-7573 [employer]. [address] [place] [address], [place] [address]. All rights reserved. This  information is not intended as a substitute for professional medical care. Always follow your healthcare professional's instructions.        Self-Care for Vomiting and Diarrhea    Vomiting and diarrhea can make you miserable. Your stomach and bowels are reacting to an irritant. This might be food, medicine, or viral stomach flu. Vomiting and diarrhea are two ways your body can remove the problem from your system. Nausea is a symptom that discourages you from eating. This gives your stomach and bowels time to recover. To get back to normal, start with self-care to ease your discomfort.  Drink liquids  Drink or sip liquids to avoid losing too much fluid (dehydration):  · Clear liquids such as water or broth are the best choices.  · Do not drink beverages with a lot of sugar in them, such as juices and sodas. These can make diarrhea worse.  · If you have severe vomiting, do not drink sport drinks, such as electrolyte solutions. These don't have the right mix of water, sugar, and minerals. They can also make the symptoms worse. In this situation, commercially available oral rehydration solutions are best.   · Suck on ice chips if the thought of drinking something makes you queasy.  When youre able to eat again  Tips include the following:  · As your appetite comes back, you can resume your normal diet.  · Ask your healthcare provider whether you should stay away from any foods.  Medicines  Know the following about medicines:  · Vomiting and diarrhea are ways your body uses to rid itself of harmful substances such as bacteria. DO NOT use antidiarrheal or antivomiting (antiemetic) medicines unless your healthcare provider tells you to do so.  · Aspirin, medicine with aspirin, and many aspirin substitutes can irritate your stomach. So avoid them when you have stomach upset.  · Certain prescription and over-the-counter medicines can cause vomiting and diarrhea. Talk with your healthcare provider about any medicines you  take that may be causing these symptoms.  · Certain over-the-counter antihistamines can help control nausea. Other medicines can help soothe stomach upset. Ask your healthcare provider which medicines may help you.  When to call your healthcare provider  Call your healthcare provider if you have:  · Bloody or black vomit or stools  · Severe, steady belly pain  · Vomiting with a severe headache or vomiting after a head injury  · Vomiting and diarrhea together for more than an hour  · An inability to hold down even sips of liquids for more than 12 hours  · Vomiting that lasts more than 24 hours  · Severe diarrhea that lasts more than 2 days  · Yellowish color to your skin or the whites of your eyes  · Inability to urinate. In infants and young children, not making wet diapers.    Date Last Reviewed: 6/1/2016  © 3206-2588 [employer]. [address] [place] [address], [place] [address]. All rights reserved. This information is not intended as a substitute for professional medical care. Always follow your healthcare professional's instructions.

## 2019-11-28 NOTE — PROGRESS NOTES
Subjective: N&V. diarrhea       Patient ID: Meg Lockhart is a 49 y.o. female.    Vitals:  weight is 90.3 kg (199 lb). Her temperature is 97.3 °F (36.3 °C). Her blood pressure is 136/91 (abnormal) and her pulse is 94. Her respiration is 16 and oxygen saturation is 98%.     Chief Complaint: Diarrhea (N&V, diarrhea x 2 days)    Patient complains of abdominal cramping, nausea, vomiting, diarrhea for 2 days. Denies fever, weakness, dizziness, lightheadedness.       Constitution: Negative for chills, fatigue and fever.   HENT: Negative for congestion and sore throat.    Neck: Negative for painful lymph nodes.   Cardiovascular: Negative for chest pain and leg swelling.   Eyes: Negative for double vision and blurred vision.   Respiratory: Negative for cough and shortness of breath.    Gastrointestinal: Positive for abdominal pain, nausea, vomiting and diarrhea.   Endocrine: negative.   Genitourinary: Negative for dysuria, frequency, urgency and history of kidney stones.   Musculoskeletal: Negative for joint pain, joint swelling, muscle cramps and muscle ache.   Skin: Negative for color change, pale, rash and bruising.   Allergic/Immunologic: Negative for seasonal allergies.   Neurological: Negative for dizziness, history of vertigo, light-headedness, passing out and headaches.   Hematologic/Lymphatic: Negative for swollen lymph nodes.   Psychiatric/Behavioral: Negative for nervous/anxious, sleep disturbance and depression. The patient is not nervous/anxious.        Objective:      Physical Exam   Constitutional: She is oriented to person, place, and time. Vital signs are normal. She appears well-developed and well-nourished. She is cooperative.  Non-toxic appearance. She does not have a sickly appearance. She does not appear ill. No distress.   HENT:   Head: Normocephalic and atraumatic.   Right Ear: Hearing, tympanic membrane, external ear and ear canal normal.   Left Ear: Hearing, tympanic membrane, external ear  and ear canal normal.   Nose: Nose normal. No mucosal edema, rhinorrhea or nasal deformity. No epistaxis. Right sinus exhibits no maxillary sinus tenderness and no frontal sinus tenderness. Left sinus exhibits no maxillary sinus tenderness and no frontal sinus tenderness.   Mouth/Throat: Uvula is midline, oropharynx is clear and moist and mucous membranes are normal. Mucous membranes are not pale and not dry. No trismus in the jaw. Normal dentition. No uvula swelling. No posterior oropharyngeal erythema.   Eyes: Conjunctivae and lids are normal. Right eye exhibits no discharge. Left eye exhibits no discharge. No scleral icterus.   Neck: Trachea normal, normal range of motion, full passive range of motion without pain and phonation normal. Neck supple.   Cardiovascular: Normal rate, regular rhythm, normal heart sounds, intact distal pulses and normal pulses.   Pulmonary/Chest: Effort normal and breath sounds normal. No respiratory distress.   Abdominal: Soft. Normal appearance and bowel sounds are normal. She exhibits no distension, no pulsatile midline mass and no mass. There is no tenderness.   Musculoskeletal: Normal range of motion. She exhibits no edema or deformity.   Neurological: She is alert and oriented to person, place, and time. She exhibits normal muscle tone. Coordination normal. GCS eye subscore is 4. GCS verbal subscore is 5. GCS motor subscore is 6.   Skin: Skin is warm, dry, intact, not diaphoretic and not pale.   Psychiatric: She has a normal mood and affect. Her speech is normal and behavior is normal. Judgment and thought content normal. Cognition and memory are normal.   Nursing note and vitals reviewed.        Assessment:       1. Viral syndrome    2. Nausea, vomiting, and diarrhea        Plan:       ROS positive for GI symptoms.  Physical exam reveals patient well appearing in no obvious distress. Mucous membranes are moist; oropharynx clear and free of erythema or edema. Lungs clear, heart  regular rate and rhythm. Abdomen is soft and nontender with no rebound, rigidity or CVA tenderness; bowel sound normal. FROM of neck and all extremities with strength 5/5 bilaterally. Skin free of rash, pallor and diaphoresis.     clinic management: patient well appearing, afebrile and no abnormalities on exam.  We will send home with antiemetic for symptoms control and instructions on diet for N/V for probable viral etiology of symptoms    Impression/Plan: The encounter diagnosis was Non-intractable vomiting with nausea, unspecified vomiting type. Discharged home with zofran.  Patient will follow up with Primary.  Patient cautioned on when to return to ED.  Pt. Understands and agrees with current treatment plan    Advised patient: Take your medications as prescribed. Take over the counter probiotics if you are having diarrhea. Follow up with your PCP if you are not improving in 3-5 days. Be sure to drink plenty of clear fluids to stay hydrated. Eat a bland diet. Return to the emergency department if you have vomiting despite medications, have no urine production or any other concerns. Refer to the additional material provided for further information.    Viral syndrome    Nausea, vomiting, and diarrhea    Other orders  -     ondansetron (ZOFRAN-ODT) 8 MG TbDL; Take 1 tablet (8 mg total) by mouth every 8 (eight) hours as needed.  Dispense: 9 tablet; Refill: 0  -     hyoscyamine (ANASPAZ,LEVSIN) 0.125 mg Tab; Take 1 tablet (125 mcg total) by mouth every 6 (six) hours as needed.  Dispense: 20 tablet; Refill: 0

## 2019-12-30 ENCOUNTER — OFFICE VISIT (OUTPATIENT)
Dept: URGENT CARE | Facility: CLINIC | Age: 49
End: 2019-12-30
Payer: COMMERCIAL

## 2019-12-30 ENCOUNTER — HOSPITAL ENCOUNTER (EMERGENCY)
Facility: HOSPITAL | Age: 49
Discharge: HOME | End: 2019-12-30
Attending: EMERGENCY MEDICINE
Payer: COMMERCIAL

## 2019-12-30 VITALS
WEIGHT: 204 LBS | DIASTOLIC BLOOD PRESSURE: 100 MMHG | SYSTOLIC BLOOD PRESSURE: 179 MMHG | TEMPERATURE: 97 F | OXYGEN SATURATION: 98 % | RESPIRATION RATE: 14 BRPM | HEART RATE: 84 BPM | BODY MASS INDEX: 33.95 KG/M2

## 2019-12-30 VITALS
RESPIRATION RATE: 16 BRPM | BODY MASS INDEX: 33.82 KG/M2 | HEART RATE: 75 BPM | SYSTOLIC BLOOD PRESSURE: 188 MMHG | DIASTOLIC BLOOD PRESSURE: 96 MMHG | WEIGHT: 203 LBS | HEIGHT: 65 IN | OXYGEN SATURATION: 100 % | TEMPERATURE: 98 F

## 2019-12-30 DIAGNOSIS — R68.84 JAW PAIN: ICD-10-CM

## 2019-12-30 DIAGNOSIS — I10 HYPERTENSION, UNSPECIFIED TYPE: ICD-10-CM

## 2019-12-30 DIAGNOSIS — M54.2 NECK PAIN ON LEFT SIDE: Primary | ICD-10-CM

## 2019-12-30 DIAGNOSIS — M54.2 NECK PAIN: Primary | ICD-10-CM

## 2019-12-30 DIAGNOSIS — R73.9 HYPERGLYCEMIA: ICD-10-CM

## 2019-12-30 DIAGNOSIS — R07.9 CHEST PAIN: ICD-10-CM

## 2019-12-30 LAB
ANION GAP SERPL CALC-SCNC: 12 MMOL/L (ref 8–16)
BASOPHILS # BLD AUTO: 0.02 K/UL (ref 0–0.2)
BASOPHILS NFR BLD: 0.3 % (ref 0–1.9)
BUN SERPL-MCNC: 9 MG/DL (ref 6–20)
CALCIUM SERPL-MCNC: 10 MG/DL (ref 8.7–10.5)
CHLORIDE SERPL-SCNC: 104 MMOL/L (ref 95–110)
CO2 SERPL-SCNC: 22 MMOL/L (ref 23–29)
CREAT SERPL-MCNC: 0.8 MG/DL (ref 0.5–1.4)
DIFFERENTIAL METHOD: ABNORMAL
EOSINOPHIL # BLD AUTO: 0.1 K/UL (ref 0–0.5)
EOSINOPHIL NFR BLD: 1.1 % (ref 0–8)
ERYTHROCYTE [DISTWIDTH] IN BLOOD BY AUTOMATED COUNT: 13.3 % (ref 11.5–14.5)
EST. GFR  (AFRICAN AMERICAN): >60 ML/MIN/1.73 M^2
EST. GFR  (NON AFRICAN AMERICAN): >60 ML/MIN/1.73 M^2
GLUCOSE SERPL-MCNC: 259 MG/DL (ref 70–110)
GLUCOSE SERPL-MCNC: 283 MG/DL (ref 70–110)
HCT VFR BLD AUTO: 36.4 % (ref 37–48.5)
HGB BLD-MCNC: 11.2 G/DL (ref 12–16)
IMM GRANULOCYTES # BLD AUTO: 0.02 K/UL (ref 0–0.04)
LYMPHOCYTES # BLD AUTO: 2 K/UL (ref 1–4.8)
LYMPHOCYTES NFR BLD: 30.8 % (ref 18–48)
MCH RBC QN AUTO: 28.4 PG (ref 27–31)
MCHC RBC AUTO-ENTMCNC: 30.8 G/DL (ref 32–36)
MCV RBC AUTO: 92 FL (ref 82–98)
MONOCYTES # BLD AUTO: 0.4 K/UL (ref 0.3–1)
MONOCYTES NFR BLD: 6.5 % (ref 4–15)
NEUTROPHILS # BLD AUTO: 4 K/UL (ref 1.8–7.7)
NEUTROPHILS NFR BLD: 61 % (ref 38–73)
NRBC BLD-RTO: 0 /100 WBC
PLATELET # BLD AUTO: 272 K/UL (ref 150–350)
PMV BLD AUTO: 9.7 FL (ref 9.2–12.9)
POTASSIUM SERPL-SCNC: 4 MMOL/L (ref 3.5–5.1)
RBC # BLD AUTO: 3.95 M/UL (ref 4–5.4)
SODIUM SERPL-SCNC: 138 MMOL/L (ref 136–145)
TROPONIN I SERPL DL<=0.01 NG/ML-MCNC: <0.006 NG/ML (ref 0–0.03)
WBC # BLD AUTO: 6.49 K/UL (ref 3.9–12.7)

## 2019-12-30 PROCEDURE — 93005 ELECTROCARDIOGRAM TRACING: CPT

## 2019-12-30 PROCEDURE — 36415 COLL VENOUS BLD VENIPUNCTURE: CPT

## 2019-12-30 PROCEDURE — 80048 BASIC METABOLIC PNL TOTAL CA: CPT

## 2019-12-30 PROCEDURE — 82962 GLUCOSE BLOOD TEST: CPT | Mod: ,,, | Performed by: NURSE PRACTITIONER

## 2019-12-30 PROCEDURE — 93000 ELECTROCARDIOGRAM COMPLETE: CPT | Mod: S$GLB,,, | Performed by: NURSE PRACTITIONER

## 2019-12-30 PROCEDURE — 84484 ASSAY OF TROPONIN QUANT: CPT

## 2019-12-30 PROCEDURE — 93000 PR ELECTROCARDIOGRAM, COMPLETE: ICD-10-PCS | Mod: S$GLB,,, | Performed by: NURSE PRACTITIONER

## 2019-12-30 PROCEDURE — 82962 POCT GLUCOSE, HAND-HELD DEVICE: ICD-10-PCS | Mod: ,,, | Performed by: NURSE PRACTITIONER

## 2019-12-30 PROCEDURE — 85025 COMPLETE CBC W/AUTO DIFF WBC: CPT

## 2019-12-30 PROCEDURE — 99214 OFFICE O/P EST MOD 30 MIN: CPT | Mod: 25,S$GLB,, | Performed by: NURSE PRACTITIONER

## 2019-12-30 PROCEDURE — 99285 EMERGENCY DEPT VISIT HI MDM: CPT | Mod: 25

## 2019-12-30 PROCEDURE — 99214 PR OFFICE/OUTPT VISIT, EST, LEVL IV, 30-39 MIN: ICD-10-PCS | Mod: 25,S$GLB,, | Performed by: NURSE PRACTITIONER

## 2019-12-31 NOTE — PROGRESS NOTES
"Subjective:       Patient ID: Meg Lockhart is a 49 y.o. female.    Vitals:  weight is 92.5 kg (204 lb). Her temperature is 97.1 °F (36.2 °C). Her blood pressure is 179/100 (abnormal) and her pulse is 84. Her respiration is 14 and oxygen saturation is 98%.     Chief Complaint: Neck Pain    Family hx includes mother having a heart attack at age 48, brother having a stroke at 29  Uncontrolled DM and HTN, states as she gains weight everything "gets out of control". States she usually has her blood sugar in the 400s range    Woke up with neck and shoulder pain a few days ago, now radiating to left jaw, describes as an ache    Neck Pain    This is a new problem. The current episode started in the past 7 days (12/25/19). The problem occurs constantly. The pain is present in the left side and anterior neck. The pain is at a severity of 10/10. The symptoms are aggravated by position (movements and SYED ). Pertinent negatives include no chest pain, fever, headaches or weakness. Associated symptoms comments: Pain radiates up th neck ,  Left side face ( that's causes teeth pain) shoulders( top )  and back . She has tried ice and heat (flexiril ) for the symptoms. The treatment provided no relief.       Constitution: Negative for chills, fatigue and fever.   HENT: Negative for congestion and sore throat.    Neck: Positive for neck pain. Negative for painful lymph nodes.   Cardiovascular: Negative for chest pain and leg swelling.   Eyes: Negative for double vision and blurred vision.   Respiratory: Negative for cough and shortness of breath.    Gastrointestinal: Negative for nausea, vomiting and diarrhea.   Genitourinary: Negative for dysuria, frequency, urgency and history of kidney stones.   Musculoskeletal: Negative for joint pain, joint swelling, muscle cramps and muscle ache.   Skin: Negative for color change, pale, rash and bruising.   Allergic/Immunologic: Negative for seasonal allergies.   Neurological: Negative for " dizziness, history of vertigo, light-headedness, passing out and headaches.   Hematologic/Lymphatic: Negative for swollen lymph nodes.   Psychiatric/Behavioral: Negative for nervous/anxious, sleep disturbance and depression. The patient is not nervous/anxious.        Objective:      Physical Exam   Constitutional: She is oriented to person, place, and time. She appears well-developed and well-nourished. She is cooperative.  Non-toxic appearance. She does not appear ill. No distress.   HENT:   Head: Normocephalic and atraumatic.   Right Ear: Hearing, tympanic membrane, external ear and ear canal normal.   Left Ear: Hearing, tympanic membrane, external ear and ear canal normal.   Nose: Nose normal. No mucosal edema, rhinorrhea or nasal deformity. No epistaxis. Right sinus exhibits no maxillary sinus tenderness and no frontal sinus tenderness. Left sinus exhibits no maxillary sinus tenderness and no frontal sinus tenderness.   Mouth/Throat: Uvula is midline, oropharynx is clear and moist and mucous membranes are normal. No trismus in the jaw. Normal dentition. No uvula swelling. No posterior oropharyngeal erythema.   Eyes: Conjunctivae and lids are normal. Right eye exhibits no discharge. Left eye exhibits no discharge. No scleral icterus.   Neck: Trachea normal, normal range of motion, full passive range of motion without pain and phonation normal. Neck supple.   Cardiovascular: Normal rate, regular rhythm, normal heart sounds, intact distal pulses and normal pulses.   Pulmonary/Chest: Effort normal and breath sounds normal. No respiratory distress.   Abdominal: Soft. Normal appearance and bowel sounds are normal. She exhibits no distension, no pulsatile midline mass and no mass. There is no tenderness.   Musculoskeletal: Normal range of motion. She exhibits no edema or deformity.   Neurological: She is alert and oriented to person, place, and time. She exhibits normal muscle tone. Coordination normal.   Skin: Skin is  warm, dry, intact, not diaphoretic and not pale.   Psychiatric: She has a normal mood and affect. Her speech is normal and behavior is normal. Judgment and thought content normal. Cognition and memory are normal.   Nursing note and vitals reviewed.        Assessment:       1. Neck pain on left side    2. Jaw pain    3. Hypertension, unspecified type    4. Hyperglycemia        Plan:       Due to patient's hx of uncontrolled DM and HTN, family history and presentation, recommended urgent evaluation in ER  Pt states she was going to go there first, but decided to stop at urgent care on the way just in case    Will go via POV  Neck pain on left side  -     POCT Glucose, Hand-Held Device  -     EKG 12-lead; Future    Jaw pain    Hypertension, unspecified type    Hyperglycemia

## 2019-12-31 NOTE — ED PROVIDER NOTES
Encounter Date: 12/30/2019    SCRIBE #1 NOTE: I, Lashonda Ellis, am scribing for, and in the presence of, Dr. Samir Rodriguez.       History     Chief Complaint   Patient presents with    Chest Pain     chest pain x3-4 worsening today          Time seen by provider: 8:21 PM on 12/30/2019    Meg Lockhart is a 49 y.o. female with a PMHx of HTN, DM, GERD, and HLD who presents the ED with complaints of worsening left sided neck pain, chest pain, back pain, and jaw pain since x4 days ago. The patient reports waking up x4 days ago with the left sided neck pain which started radiating to her left sided chest, back, and jaw. She also mentions having headaches everyday. The patient also notes the onset of decreased appetite and SOB secondary to her symptoms. She has tried icy hot patches, flexeril, and naproxen without relief. The patient denies history of cardiac catheterization or stents. She also denies abdominal pain, sweating, dizziness, and syncope. Penicillin, sulfa, and meperidine allergy noted.    The history is provided by the patient.     Review of patient's allergies indicates:   Allergen Reactions    Penicillins Itching and Anaphylaxis    Sulfa (sulfonamide antibiotics) Anaphylaxis    Meperidine Itching     Past Medical History:   Diagnosis Date    Anxiety     Diabetes mellitus     Elevated cholesterol     GERD (gastroesophageal reflux disease)     Hypertension     PUD (peptic ulcer disease)      Past Surgical History:   Procedure Laterality Date    GASTRIC BYPASS      HYSTERECTOMY       Family History   Problem Relation Age of Onset    Heart disease Mother     Hypertension Mother     Stroke Brother     Cancer Maternal Aunt     Cancer Maternal Grandmother         lung    Heart disease Maternal Grandmother     Diabetes Paternal Grandmother     Heart disease Paternal Grandmother     Diabetes Paternal Grandfather      Social History     Tobacco Use    Smoking status: Never Smoker    Substance Use Topics    Alcohol use: No    Drug use: No     Review of Systems   Constitutional: Positive for appetite change. Negative for diaphoresis and fever.   HENT:        + jaw pain   Respiratory: Positive for shortness of breath.    Cardiovascular: Positive for chest pain.   Gastrointestinal: Negative for abdominal pain.   Genitourinary: Negative for difficulty urinating.   Musculoskeletal: Positive for back pain and neck pain.   Skin: Negative for rash.   Neurological: Positive for headaches. Negative for dizziness and syncope.   Hematological: Does not bruise/bleed easily.       Physical Exam     Initial Vitals [12/30/19 2033]   BP Pulse Resp Temp SpO2   (!) 217/102 89 16 98.1 °F (36.7 °C) 100 %      MAP       --         Physical Exam    Nursing note and vitals reviewed.  Constitutional: She appears well-developed and well-nourished. She is not diaphoretic. No distress.   HENT:   Head: Normocephalic and atraumatic.   Mouth/Throat: Oropharynx is clear and moist.   Eyes: Conjunctivae are normal.   Neck: Neck supple.   Mild reproducible tenderness to lateral neck into trapezius muscle. No carotid bruit.   Cardiovascular: Normal rate, regular rhythm, normal heart sounds and intact distal pulses. Exam reveals no gallop and no friction rub.    No murmur heard.  Pulses:       Radial pulses are 2+ on the right side, and 2+ on the left side.        Dorsalis pedis pulses are 2+ on the right side, and 2+ on the left side.        Posterior tibial pulses are 2+ on the right side, and 2+ on the left side.   Pulmonary/Chest: Breath sounds normal. She has no wheezes. She has no rhonchi. She has no rales.   Equal, bilateral breath sounds noted without wheezing.    Abdominal: Soft. She exhibits no distension. There is no tenderness.   No palpable abdominal tenderness noted.    Musculoskeletal: Normal range of motion. She exhibits no edema.   No leg tenderness or edema.   Neurological: She is alert and oriented to person,  place, and time.   Cranial nerves II-XII intact. 5/5 strength and sensation to light touch intact.   Skin: No rash noted. No erythema.   Psychiatric: Her speech is normal.         ED Course   Procedures  Labs Reviewed   CBC W/ AUTO DIFFERENTIAL - Abnormal; Notable for the following components:       Result Value    RBC 3.95 (*)     Hemoglobin 11.2 (*)     Hematocrit 36.4 (*)     Mean Corpuscular Hemoglobin Conc 30.8 (*)     All other components within normal limits   BASIC METABOLIC PANEL - Abnormal; Notable for the following components:    CO2 22 (*)     Glucose 259 (*)     All other components within normal limits   TROPONIN I          Imaging Results          X-Ray Chest PA And Lateral (Final result)  Result time 12/30/19 21:39:04    Final result by Yao Velázquez MD (12/30/19 21:39:04)                 Impression:      No acute process.      Electronically signed by: Yao Velázquez MD  Date:    12/30/2019  Time:    21:39             Narrative:    EXAMINATION:  XR CHEST PA AND LATERAL    CLINICAL HISTORY:  CP;    TECHNIQUE:  PA and lateral views of the chest were performed.    COMPARISON:  08/14/2013.    FINDINGS:  Monitoring EKG leads are present.  The trachea is unremarkable.  The cardiomediastinal silhouette is within normal limits.  The hemidiaphragms are unremarkable.  There is no evidence of free air beneath the hemidiaphragms.  There are no pleural effusions.  There is no evidence of a pneumothorax.  There is no evidence of pneumomediastinum.  No airspace opacity is present.  The osseous structures are unremarkable.                            (radiology reading, visualized by me)     Medical Decision Making:   History:   Old Medical Records: I decided to obtain old medical records.  Independently Interpreted Test(s):   I have ordered and independently interpreted EKG Reading(s) - see summary below  Clinical Tests:   Lab Tests: Reviewed and Ordered  Radiological Study: Ordered and Reviewed  Medical Tests:  Reviewed and Ordered            Scribe Attestation:   Scribe #1: I performed the above scribed service and the documentation accurately describes the services I performed. I attest to the accuracy of the note.    I, Dr. Samir Rodriguez, personally performed the services described in this documentation. All medical record entries made by the scribe were at my direction and in my presence.  I have reviewed the chart and agree that the record reflects my personal performance and is accurate and complete. Samir Rodriguez MD.  11:04 PM 12/30/2019    Meg Lockhart is a 49 y.o. female presenting with likely musculoskeletal pain in the neck as well as associated musculature of the trapezius and chest wall.  Very low suspicion for emergent, life-threatening issue.  Low suspicion for ACS with negative workup here.  Low suspicion for other emergent process such as aortic dissection or PE.  I do not think further D-dimer CT angiography are indicated.  There is no carotid bruit.  I doubt vessel dissection.  There is no posterior midline neck tenderness and I have very low suspicion for other primary spinal cord issue.  She is appropriate for outpatient PCP and cardiology follow-up.  Symptomatic treatment as necessary for home with resting gradual return to activity.  Detailed return precautions reviewed as well.          ED Course as of Dec 30 2303   Mon Dec 30, 2019   2032 EKG:  Normal sinus rhythm at a rate of 74.  Mild motion artifact in V1/3.  Normal intervals.  Normal axis.  No significant ST or T wave changes suggesting acute ischemia or infarction.  No change from previous at urgent care PTA.    [MR]   2201 HEART score is 3.    [MR]      ED Course User Index  [MR] Samir Rodriguez MD                Clinical Impression:       ICD-10-CM ICD-9-CM   1. Neck pain M54.2 723.1   2. Chest pain R07.9 786.50         Disposition:   Disposition: Discharged  Condition: Stable                     Samir BATISTA  MD Michael  12/30/19 [id number]

## 2020-07-29 ENCOUNTER — CLINICAL SUPPORT (OUTPATIENT)
Dept: URGENT CARE | Facility: CLINIC | Age: 50
End: 2020-07-29
Payer: COMMERCIAL

## 2020-07-29 VITALS
OXYGEN SATURATION: 98 % | HEIGHT: 65 IN | TEMPERATURE: 98 F | WEIGHT: 195 LBS | DIASTOLIC BLOOD PRESSURE: 77 MMHG | HEART RATE: 93 BPM | SYSTOLIC BLOOD PRESSURE: 119 MMHG | BODY MASS INDEX: 32.49 KG/M2 | RESPIRATION RATE: 16 BRPM

## 2020-07-29 DIAGNOSIS — E11.65 UNCONTROLLED TYPE 2 DIABETES MELLITUS WITH HYPERGLYCEMIA: ICD-10-CM

## 2020-07-29 DIAGNOSIS — N30.01 ACUTE CYSTITIS WITH HEMATURIA: Primary | ICD-10-CM

## 2020-07-29 DIAGNOSIS — R30.0 DYSURIA: ICD-10-CM

## 2020-07-29 LAB
BILIRUB UR QL STRIP: POSITIVE
GLUCOSE SERPL-MCNC: 244 MG/DL (ref 70–110)
GLUCOSE UR QL STRIP: NEGATIVE
KETONES UR QL STRIP: POSITIVE
LEUKOCYTE ESTERASE UR QL STRIP: POSITIVE
PH, POC UA: 5.5
POC BLOOD, URINE: POSITIVE
POC NITRATES, URINE: NEGATIVE
PROT UR QL STRIP: POSITIVE
SP GR UR STRIP: 1.02 (ref 1–1.03)
UROBILINOGEN UR STRIP-ACNC: POSITIVE (ref 0.1–1.1)

## 2020-07-29 PROCEDURE — 82962 GLUCOSE BLOOD TEST: CPT | Mod: ,,, | Performed by: NURSE PRACTITIONER

## 2020-07-29 PROCEDURE — 82962 POCT GLUCOSE, HAND-HELD DEVICE: ICD-10-PCS | Mod: ,,, | Performed by: NURSE PRACTITIONER

## 2020-07-29 PROCEDURE — 99214 OFFICE O/P EST MOD 30 MIN: CPT | Mod: 25,S$GLB,, | Performed by: NURSE PRACTITIONER

## 2020-07-29 PROCEDURE — 81003 POCT URINALYSIS, DIPSTICK, AUTOMATED, W/O SCOPE: ICD-10-PCS | Mod: QW,S$GLB,, | Performed by: NURSE PRACTITIONER

## 2020-07-29 PROCEDURE — 99214 PR OFFICE/OUTPT VISIT, EST, LEVL IV, 30-39 MIN: ICD-10-PCS | Mod: 25,S$GLB,, | Performed by: NURSE PRACTITIONER

## 2020-07-29 PROCEDURE — 81003 URINALYSIS AUTO W/O SCOPE: CPT | Mod: QW,S$GLB,, | Performed by: NURSE PRACTITIONER

## 2020-07-29 RX ORDER — NITROFURANTOIN 25; 75 MG/1; MG/1
100 CAPSULE ORAL 2 TIMES DAILY
Qty: 14 CAPSULE | Refills: 0 | Status: SHIPPED | OUTPATIENT
Start: 2020-07-29 | End: 2020-08-05

## 2020-07-29 NOTE — PATIENT INSTRUCTIONS
Understanding Urinary Tract Infections (UTIs)  Most UTIs are caused by bacteria, although they may also be caused by viruses or fungi. Bacteria from the bowel are the most common source of infection. The infection may start because of any of the following:  · Sexual activity. During sex, bacteria can travel from the penis, vagina, or rectum into the urethra.   · Bacteria on the skin outside the rectum may travel into the urethra. This is more common in women since the rectum and urethra are closer to each other than in men. Wiping from front to back after using the toilet and keeping the area clean can help prevent germs from getting to the urethra.  · Blockage of urine flow through the urinary tract. If urine sits too long, germs may start to grow out of control.      Parts of the urinary tract  The infection can occur in any part of the urinary tract.  · The kidneys collect and store urine.  · The ureters carry urine from the kidneys to the bladder.  · The bladder holds urine until you are ready to let it out.  · The urethra carries urine from the bladder out of the body. It is shorter in women, so bacteria can move through it more easily. The urethra is longer in men, so a UTI is less likely to reach the bladder or kidneys in men.  Date Last Reviewed: 1/1/2017  © 0968-9440 [employer]. [address] [place] [address], [place] [address]. All rights reserved. This information is not intended as a substitute for professional medical care. Always follow your healthcare professional's instructions.

## 2020-07-29 NOTE — PROGRESS NOTES
"Subjective:       Patient ID: Meg Lockhart is a 50 y.o. female.    Vitals:  height is 5' 5" (1.651 m) and weight is 88.5 kg (195 lb). Her temperature is 97.7 °F (36.5 °C). Her blood pressure is 119/77 and her pulse is 93. Her respiration is 16 and oxygen saturation is 98%.     Chief Complaint: Dysuria    Pt presents today with complaints of frequency, urgency, and dysuria x 1 week with reports of dark urine. Pt states that she had an episode of urinary incontinence last night when she could not get to the bathroom in time. Denies fever, flank pain, and lower abd pain.  Pt with history of type 2 diabetes that had been uncontrolled up until recently with blood glucoses in the 400's without her Ozempic. She has since gotten her Ozempic in the last few weeks, and her blood sugars have improved. Pt reports her fasting blood sugar this morning was 144.        Constitution: Negative for chills and fever.   Neck: Negative for painful lymph nodes.   Gastrointestinal: Negative for abdominal pain, nausea and vomiting.   Genitourinary: Positive for dysuria, frequency, urgency, urine decreased and bladder incontinence. Negative for flank pain, hematuria, history of kidney stones, painful menstruation, irregular menstruation, missed menses, heavy menstrual bleeding, ovarian cysts, genital trauma, vaginal pain, vaginal discharge, vaginal bleeding, vaginal odor, painful intercourse, genital sore, painful ejaculation and pelvic pain.   Musculoskeletal: Negative for back pain.   Skin: Negative for rash and lesion.   Hematologic/Lymphatic: Negative for swollen lymph nodes.       Objective:      Physical Exam   Constitutional: She is oriented to person, place, and time. She appears well-developed.   HENT:   Head: Normocephalic and atraumatic.   Ears:   Right Ear: External ear normal.   Left Ear: External ear normal.   Nose: Nose normal. No nasal deformity. No epistaxis.   Mouth/Throat: Oropharynx is clear and moist and mucous " membranes are normal.   Eyes: Lids are normal.   Neck: Trachea normal, normal range of motion and phonation normal. Neck supple.   Cardiovascular: Normal pulses.   Pulmonary/Chest: Effort normal.   Abdominal: Soft. Normal appearance and bowel sounds are normal. She exhibits no distension. There is no abdominal tenderness.   Neurological: She is alert and oriented to person, place, and time.   Skin: Skin is warm, dry and intact. Psychiatric: Her speech is normal and behavior is normal.   Nursing note and vitals reviewed.        Assessment:       1. Acute cystitis with hematuria    2. Dysuria    3. Uncontrolled type 2 diabetes mellitus with hyperglycemia        Plan:         Acute cystitis with hematuria  -     Urine culture  -     nitrofurantoin, macrocrystal-monohydrate, (MACROBID) 100 MG capsule; Take 1 capsule (100 mg total) by mouth 2 (two) times daily. for 7 days  Dispense: 14 capsule; Refill: 0    Dysuria  -     POCT Urinalysis, Dipstick, Automated, W/O Scope    Uncontrolled type 2 diabetes mellitus with hyperglycemia  -     POCT Glucose, Hand-Held Device

## 2020-08-05 LAB
BACTERIA UR CULT: ABNORMAL
BACTERIA UR CULT: ABNORMAL
OTHER ANTIBIOTIC SUSC ISLT: ABNORMAL

## 2020-12-30 ENCOUNTER — TELEPHONE (OUTPATIENT)
Dept: SURGERY | Facility: CLINIC | Age: 50
End: 2020-12-30

## 2020-12-30 NOTE — TELEPHONE ENCOUNTER
----- Message from Kathy Kendrick sent at 12/30/2020  8:52 AM CST -----  Regarding: pt advice  Contact: pt @ [phone]  Pt calling to speak with someone in Dr. Joyce's office regarding whether the doctor could treat her for acid reflux, says she previously had a gastric sleeve done. Please call.

## 2020-12-30 NOTE — TELEPHONE ENCOUNTER
Returned call and spoke with patient. Patient states she has been experiencing reflux and is interested in the LINX procedure that Dr. Joyce offers. Appointment scheduled upon request. Patient aware of appointment date, time, and location. Patient voiced understanding; all questions answered; no other issues discussed.

## 2021-01-26 ENCOUNTER — OFFICE VISIT (OUTPATIENT)
Dept: URGENT CARE | Facility: CLINIC | Age: 51
End: 2021-01-26
Payer: COMMERCIAL

## 2021-01-26 VITALS
WEIGHT: 206 LBS | HEART RATE: 82 BPM | SYSTOLIC BLOOD PRESSURE: 133 MMHG | DIASTOLIC BLOOD PRESSURE: 82 MMHG | HEIGHT: 65 IN | RESPIRATION RATE: 15 BRPM | BODY MASS INDEX: 34.32 KG/M2 | TEMPERATURE: 98 F | OXYGEN SATURATION: 100 %

## 2021-01-26 DIAGNOSIS — R81 GLUCOSURIA: ICD-10-CM

## 2021-01-26 DIAGNOSIS — R30.0 DYSURIA: Primary | ICD-10-CM

## 2021-01-26 LAB
BILIRUB UR QL STRIP: NEGATIVE
GLUCOSE UR QL STRIP: POSITIVE
KETONES UR QL STRIP: NEGATIVE
LEUKOCYTE ESTERASE UR QL STRIP: NEGATIVE
PH, POC UA: 5
POC BLOOD, URINE: NEGATIVE
POC NITRATES, URINE: NEGATIVE
PROT UR QL STRIP: NEGATIVE
SP GR UR STRIP: 1.01 (ref 1–1.03)
UROBILINOGEN UR STRIP-ACNC: ABNORMAL (ref 0.1–1.1)

## 2021-01-26 PROCEDURE — 99214 PR OFFICE/OUTPT VISIT, EST, LEVL IV, 30-39 MIN: ICD-10-PCS | Mod: 25,S$GLB,, | Performed by: NURSE PRACTITIONER

## 2021-01-26 PROCEDURE — 81003 URINALYSIS AUTO W/O SCOPE: CPT | Mod: QW,S$GLB,, | Performed by: NURSE PRACTITIONER

## 2021-01-26 PROCEDURE — 99214 OFFICE O/P EST MOD 30 MIN: CPT | Mod: 25,S$GLB,, | Performed by: NURSE PRACTITIONER

## 2021-01-26 PROCEDURE — 81003 POCT URINALYSIS, DIPSTICK, AUTOMATED, W/O SCOPE: ICD-10-PCS | Mod: QW,S$GLB,, | Performed by: NURSE PRACTITIONER

## 2021-01-26 RX ORDER — CEPHALEXIN 500 MG/1
500 CAPSULE ORAL EVERY 12 HOURS
Qty: 14 CAPSULE | Refills: 0 | Status: SHIPPED | OUTPATIENT
Start: 2021-01-26 | End: 2021-02-02

## 2021-02-03 LAB
BACTERIA UR CULT: ABNORMAL
BACTERIA UR CULT: ABNORMAL
OTHER ANTIBIOTIC SUSC ISLT: ABNORMAL

## 2021-02-03 RX ORDER — NITROFURANTOIN 25; 75 MG/1; MG/1
100 CAPSULE ORAL 2 TIMES DAILY
Qty: 14 CAPSULE | Refills: 0 | Status: SHIPPED | OUTPATIENT
Start: 2021-02-03 | End: 2021-02-10

## 2021-02-08 ENCOUNTER — TELEPHONE (OUTPATIENT)
Dept: URGENT CARE | Facility: CLINIC | Age: 51
End: 2021-02-08

## 2021-02-27 ENCOUNTER — OFFICE VISIT (OUTPATIENT)
Dept: URGENT CARE | Facility: CLINIC | Age: 51
End: 2021-02-27
Payer: COMMERCIAL

## 2021-02-27 VITALS
DIASTOLIC BLOOD PRESSURE: 85 MMHG | SYSTOLIC BLOOD PRESSURE: 147 MMHG | RESPIRATION RATE: 12 BRPM | BODY MASS INDEX: 34.95 KG/M2 | HEART RATE: 85 BPM | TEMPERATURE: 98 F | WEIGHT: 210 LBS | OXYGEN SATURATION: 99 %

## 2021-02-27 DIAGNOSIS — R81 GLUCOSURIA: ICD-10-CM

## 2021-02-27 DIAGNOSIS — R30.0 DYSURIA: Primary | ICD-10-CM

## 2021-02-27 LAB
BILIRUB UR QL STRIP: NEGATIVE
GLUCOSE UR QL STRIP: POSITIVE
KETONES UR QL STRIP: NEGATIVE
LEUKOCYTE ESTERASE UR QL STRIP: NEGATIVE
PH, POC UA: 5
POC BLOOD, URINE: NEGATIVE
POC NITRATES, URINE: NEGATIVE
PROT UR QL STRIP: NEGATIVE
SP GR UR STRIP: 1.02 (ref 1–1.03)
UROBILINOGEN UR STRIP-ACNC: NORMAL (ref 0.1–1.1)

## 2021-02-27 PROCEDURE — 81003 POCT URINALYSIS, DIPSTICK, AUTOMATED, W/O SCOPE: ICD-10-PCS | Mod: QW,S$GLB,, | Performed by: NURSE PRACTITIONER

## 2021-02-27 PROCEDURE — 99214 OFFICE O/P EST MOD 30 MIN: CPT | Mod: 25,S$GLB,, | Performed by: NURSE PRACTITIONER

## 2021-02-27 PROCEDURE — 81003 URINALYSIS AUTO W/O SCOPE: CPT | Mod: QW,S$GLB,, | Performed by: NURSE PRACTITIONER

## 2021-02-27 PROCEDURE — 99214 PR OFFICE/OUTPT VISIT, EST, LEVL IV, 30-39 MIN: ICD-10-PCS | Mod: 25,S$GLB,, | Performed by: NURSE PRACTITIONER

## 2021-02-27 RX ORDER — CIPROFLOXACIN 500 MG/1
500 TABLET ORAL EVERY 12 HOURS
Qty: 14 TABLET | Refills: 0 | Status: SHIPPED | OUTPATIENT
Start: 2021-02-27 | End: 2021-03-06

## 2021-02-27 RX ORDER — PHENAZOPYRIDINE HYDROCHLORIDE 200 MG/1
200 TABLET, FILM COATED ORAL 3 TIMES DAILY PRN
Qty: 12 TABLET | Refills: 0 | Status: SHIPPED | OUTPATIENT
Start: 2021-02-27 | End: 2021-03-03

## 2021-03-04 LAB
BACTERIA UR CULT: NORMAL
BACTERIA UR CULT: NORMAL

## 2021-03-09 ENCOUNTER — TELEPHONE (OUTPATIENT)
Dept: URGENT CARE | Facility: CLINIC | Age: 51
End: 2021-03-09

## 2021-03-15 ENCOUNTER — TELEPHONE (OUTPATIENT)
Dept: URGENT CARE | Facility: CLINIC | Age: 51
End: 2021-03-15

## 2021-05-06 ENCOUNTER — PATIENT MESSAGE (OUTPATIENT)
Dept: RESEARCH | Facility: HOSPITAL | Age: 51
End: 2021-05-06

## 2021-06-14 ENCOUNTER — TELEPHONE (OUTPATIENT)
Dept: PHYSICAL MEDICINE AND REHAB | Facility: CLINIC | Age: 51
End: 2021-06-14

## 2021-06-14 LAB
ALBUMIN SERPL BCP-MCNC: 3.8 G/DL (ref 3.5–5.2)
ALP SERPL-CCNC: 65 U/L (ref 55–135)
ALT SERPL W/O P-5'-P-CCNC: 14 U/L (ref 10–44)
ANION GAP SERPL CALC-SCNC: 11 MMOL/L (ref 8–16)
AST SERPL-CCNC: 16 U/L (ref 10–40)
BASOPHILS # BLD AUTO: 0.03 K/UL (ref 0–0.2)
BASOPHILS NFR BLD: 0.4 % (ref 0–1.9)
BILIRUB SERPL-MCNC: 0.6 MG/DL (ref 0.1–1)
BUN SERPL-MCNC: 13 MG/DL (ref 6–20)
CALCIUM SERPL-MCNC: 9.1 MG/DL (ref 8.7–10.5)
CHLORIDE SERPL-SCNC: 103 MMOL/L (ref 95–110)
CO2 SERPL-SCNC: 24 MMOL/L (ref 23–29)
CREAT SERPL-MCNC: 0.7 MG/DL (ref 0.5–1.4)
DIFFERENTIAL METHOD: ABNORMAL
EOSINOPHIL # BLD AUTO: 0.1 K/UL (ref 0–0.5)
EOSINOPHIL NFR BLD: 1.4 % (ref 0–8)
ERYTHROCYTE [DISTWIDTH] IN BLOOD BY AUTOMATED COUNT: 13.5 % (ref 11.5–14.5)
EST. GFR  (AFRICAN AMERICAN): >60 ML/MIN/1.73 M^2
EST. GFR  (NON AFRICAN AMERICAN): >60 ML/MIN/1.73 M^2
GLUCOSE SERPL-MCNC: 245 MG/DL (ref 70–110)
HCT VFR BLD AUTO: 35.9 % (ref 37–48.5)
HGB BLD-MCNC: 11.4 G/DL (ref 12–16)
IMM GRANULOCYTES # BLD AUTO: 0.03 K/UL (ref 0–0.04)
IMM GRANULOCYTES NFR BLD AUTO: 0.4 % (ref 0–0.5)
LYMPHOCYTES # BLD AUTO: 2.9 K/UL (ref 1–4.8)
LYMPHOCYTES NFR BLD: 37.9 % (ref 18–48)
MCH RBC QN AUTO: 29.2 PG (ref 27–31)
MCHC RBC AUTO-ENTMCNC: 31.8 G/DL (ref 32–36)
MCV RBC AUTO: 92 FL (ref 82–98)
MONOCYTES # BLD AUTO: 0.5 K/UL (ref 0.3–1)
MONOCYTES NFR BLD: 6.6 % (ref 4–15)
NEUTROPHILS # BLD AUTO: 4.1 K/UL (ref 1.8–7.7)
NEUTROPHILS NFR BLD: 53.3 % (ref 38–73)
NRBC BLD-RTO: 0 /100 WBC
PLATELET # BLD AUTO: 308 K/UL (ref 150–450)
PMV BLD AUTO: 10.4 FL (ref 9.2–12.9)
POTASSIUM SERPL-SCNC: 4 MMOL/L (ref 3.5–5.1)
PROT SERPL-MCNC: 7.2 G/DL (ref 6–8.4)
RBC # BLD AUTO: 3.91 M/UL (ref 4–5.4)
SODIUM SERPL-SCNC: 138 MMOL/L (ref 136–145)
WBC # BLD AUTO: 7.7 K/UL (ref 3.9–12.7)

## 2021-06-14 PROCEDURE — 99284 EMERGENCY DEPT VISIT MOD MDM: CPT | Mod: 25

## 2021-06-14 PROCEDURE — 85025 COMPLETE CBC W/AUTO DIFF WBC: CPT | Performed by: EMERGENCY MEDICINE

## 2021-06-14 PROCEDURE — 80053 COMPREHEN METABOLIC PANEL: CPT | Performed by: EMERGENCY MEDICINE

## 2021-06-15 ENCOUNTER — HOSPITAL ENCOUNTER (EMERGENCY)
Facility: HOSPITAL | Age: 51
Discharge: HOME | End: 2021-06-15
Attending: EMERGENCY MEDICINE
Payer: COMMERCIAL

## 2021-06-15 VITALS
RESPIRATION RATE: 14 BRPM | DIASTOLIC BLOOD PRESSURE: 77 MMHG | TEMPERATURE: 98 F | HEIGHT: 65 IN | BODY MASS INDEX: 34.16 KG/M2 | OXYGEN SATURATION: 95 % | SYSTOLIC BLOOD PRESSURE: 118 MMHG | WEIGHT: 205 LBS | HEART RATE: 72 BPM

## 2021-06-15 DIAGNOSIS — R51.9 NONINTRACTABLE HEADACHE, UNSPECIFIED CHRONICITY PATTERN, UNSPECIFIED HEADACHE TYPE: Primary | ICD-10-CM

## 2021-06-15 LAB
BACTERIA #/AREA URNS HPF: NEGATIVE /HPF
BILIRUB UR QL STRIP: NEGATIVE
CLARITY UR: CLEAR
COLOR UR: YELLOW
GLUCOSE UR QL STRIP: ABNORMAL
HGB UR QL STRIP: NEGATIVE
HYALINE CASTS #/AREA URNS LPF: 1 /LPF
KETONES UR QL STRIP: NEGATIVE
LEUKOCYTE ESTERASE UR QL STRIP: NEGATIVE
MICROSCOPIC COMMENT: NORMAL
NITRITE UR QL STRIP: NEGATIVE
PH UR STRIP: 6 [PH] (ref 5–8)
PROT UR QL STRIP: NEGATIVE
RBC #/AREA URNS HPF: 0 /HPF (ref 0–4)
SP GR UR STRIP: 1.01 (ref 1–1.03)
SQUAMOUS #/AREA URNS HPF: 1 /HPF
URN SPEC COLLECT METH UR: ABNORMAL
UROBILINOGEN UR STRIP-ACNC: NEGATIVE EU/DL
WBC #/AREA URNS HPF: 1 /HPF (ref 0–5)
YEAST URNS QL MICRO: NORMAL

## 2021-06-15 PROCEDURE — 81001 URINALYSIS AUTO W/SCOPE: CPT | Performed by: EMERGENCY MEDICINE

## 2021-06-18 DIAGNOSIS — R51.9 HEADACHE, UNSPECIFIED: Primary | ICD-10-CM

## 2021-06-25 ENCOUNTER — HOSPITAL ENCOUNTER (OUTPATIENT)
Dept: RADIOLOGY | Facility: HOSPITAL | Age: 51
Discharge: HOME | End: 2021-06-25
Attending: PSYCHIATRY & NEUROLOGY
Payer: COMMERCIAL

## 2021-06-25 DIAGNOSIS — R51.9 HEADACHE, UNSPECIFIED: ICD-10-CM

## 2022-01-21 ENCOUNTER — OFFICE VISIT (OUTPATIENT)
Dept: URGENT CARE | Facility: CLINIC | Age: 52
End: 2022-01-21
Payer: COMMERCIAL

## 2022-01-21 VITALS
DIASTOLIC BLOOD PRESSURE: 93 MMHG | WEIGHT: 221 LBS | BODY MASS INDEX: 36.82 KG/M2 | RESPIRATION RATE: 16 BRPM | TEMPERATURE: 99 F | HEIGHT: 65 IN | HEART RATE: 89 BPM | OXYGEN SATURATION: 99 % | SYSTOLIC BLOOD PRESSURE: 172 MMHG

## 2022-01-21 DIAGNOSIS — R11.0 NAUSEA: ICD-10-CM

## 2022-01-21 DIAGNOSIS — R21 RASH: ICD-10-CM

## 2022-01-21 DIAGNOSIS — N39.46 MIXED STRESS AND URGE URINARY INCONTINENCE: Primary | ICD-10-CM

## 2022-01-21 LAB
BILIRUB UR QL STRIP: NEGATIVE
GLUCOSE SERPL-MCNC: 140 MG/DL (ref 70–110)
GLUCOSE UR QL STRIP: NEGATIVE
KETONES UR QL STRIP: NEGATIVE
LEUKOCYTE ESTERASE UR QL STRIP: NEGATIVE
PH, POC UA: 5.5
POC BLOOD, URINE: POSITIVE
POC NITRATES, URINE: NEGATIVE
PROT UR QL STRIP: POSITIVE
SP GR UR STRIP: 1.01 (ref 1–1.03)
UROBILINOGEN UR STRIP-ACNC: NORMAL (ref 0.1–1.1)

## 2022-01-21 PROCEDURE — 82962 GLUCOSE BLOOD TEST: CPT | Mod: ,,, | Performed by: NURSE PRACTITIONER

## 2022-01-21 PROCEDURE — 81003 URINALYSIS AUTO W/O SCOPE: CPT | Mod: QW,S$GLB,, | Performed by: NURSE PRACTITIONER

## 2022-01-21 PROCEDURE — 99214 PR OFFICE/OUTPT VISIT, EST, LEVL IV, 30-39 MIN: ICD-10-PCS | Mod: 25,S$GLB,, | Performed by: NURSE PRACTITIONER

## 2022-01-21 PROCEDURE — 96372 PR INJECTION,THERAP/PROPH/DIAG2ST, IM OR SUBCUT: ICD-10-PCS | Mod: S$GLB,,, | Performed by: NURSE PRACTITIONER

## 2022-01-21 PROCEDURE — 81003 POCT URINALYSIS, DIPSTICK, AUTOMATED, W/O SCOPE: ICD-10-PCS | Mod: QW,S$GLB,, | Performed by: NURSE PRACTITIONER

## 2022-01-21 PROCEDURE — 82962 POCT GLUCOSE, HAND-HELD DEVICE: ICD-10-PCS | Mod: ,,, | Performed by: NURSE PRACTITIONER

## 2022-01-21 PROCEDURE — 99214 OFFICE O/P EST MOD 30 MIN: CPT | Mod: 25,S$GLB,, | Performed by: NURSE PRACTITIONER

## 2022-01-21 PROCEDURE — 96372 THER/PROPH/DIAG INJ SC/IM: CPT | Mod: S$GLB,,, | Performed by: NURSE PRACTITIONER

## 2022-01-21 RX ORDER — RIMEGEPANT SULFATE 75 MG/75MG
TABLET, ORALLY DISINTEGRATING ORAL
COMMUNITY
Start: 2021-08-19

## 2022-01-21 RX ORDER — ONDANSETRON HYDROCHLORIDE 8 MG/1
8 TABLET, FILM COATED ORAL EVERY 8 HOURS PRN
Qty: 30 TABLET | Refills: 0 | Status: SHIPPED | OUTPATIENT
Start: 2022-01-21

## 2022-01-21 RX ORDER — FAMOTIDINE 40 MG/1
40 TABLET, FILM COATED ORAL DAILY
Qty: 30 TABLET | Refills: 11 | Status: SHIPPED | OUTPATIENT
Start: 2022-01-21 | End: 2023-01-21

## 2022-01-21 RX ORDER — DEXAMETHASONE SODIUM PHOSPHATE 4 MG/ML
4 INJECTION, SOLUTION INTRA-ARTICULAR; INTRALESIONAL; INTRAMUSCULAR; INTRAVENOUS; SOFT TISSUE
Status: COMPLETED | OUTPATIENT
Start: 2022-01-21 | End: 2022-01-21

## 2022-01-21 RX ORDER — CETIRIZINE HYDROCHLORIDE 10 MG/1
10 TABLET ORAL DAILY
Qty: 10 TABLET | Refills: 0 | Status: SHIPPED | OUTPATIENT
Start: 2022-01-21 | End: 2022-06-14

## 2022-01-21 RX ORDER — INSULIN DEGLUDEC 100 U/ML
INJECTION, SOLUTION SUBCUTANEOUS
COMMUNITY
Start: 2021-12-27

## 2022-01-21 RX ADMIN — DEXAMETHASONE SODIUM PHOSPHATE 4 MG: 4 INJECTION, SOLUTION INTRA-ARTICULAR; INTRALESIONAL; INTRAMUSCULAR; INTRAVENOUS; SOFT TISSUE at 11:01

## 2022-01-21 NOTE — PATIENT INSTRUCTIONS
Pepcid and zyrtec for rash. Zofran as needed for nausea.  Follow up with PCP within 1 week.  See dermatology if rash not resolving.  Urology consult ordered for ongoing urinary incontinence, they will call you to schedule.  Keep bariatric follow up as scheduled.

## 2022-01-21 NOTE — PROGRESS NOTES
"Subjective:       Patient ID: Meg Lockhart is a 51 y.o. female.    Vitals:  height is 5' 5" (1.651 m) and weight is 100.2 kg (221 lb). Her temperature is 98.6 °F (37 °C). Her blood pressure is 172/93 (abnormal) and her pulse is 89. Her respiration is 16 and oxygen saturation is 99%.     Chief Complaint: Rash and Urinary Incontinence    Last Thursday X Rash on her chest, upper extrem. and spreading everywhere. They are itchy. Using benadryl, and hydrocortisone and antibiotic ointment. Took Claritin yesterday. She says she ate at [employer]. Very itchy, burn with scratching. No new medication, no new detergent or soap.   Patient has had stress urinary incontinence x 1 year.   She made a bariatric apt for February, having issues with a gastric sleeve. Acid reflux is making her cough.  She says she is controlling her diabetes, says home sugars have been around 100.     Past Medical History:  No date: Anxiety  No date: Diabetes mellitus  No date: Elevated cholesterol  No date: GERD (gastroesophageal reflux disease)  No date: Hypertension  No date: PUD (peptic ulcer disease)    Past Surgical History:  No date: GASTRIC BYPASS  No date: HYSTERECTOMY    Review of patient's family history indicates:  Problem: Heart disease      Relation: Mother          Age of Onset: (Not Specified)  Problem: Hypertension      Relation: Mother          Age of Onset: (Not Specified)  Problem: Stroke      Relation: Brother          Age of Onset: (Not Specified)  Problem: Cancer      Relation: Maternal Aunt          Age of Onset: (Not Specified)  Problem: Cancer      Relation: Maternal Grandmother          Age of Onset: (Not Specified)          Comment: lung  Problem: Heart disease      Relation: Maternal Grandmother          Age of Onset: (Not Specified)  Problem: Diabetes      Relation: Paternal Grandmother          Age of Onset: (Not Specified)  Problem: Heart disease      Relation: Paternal Grandmother          Age of Onset: (Not " Specified)  Problem: Diabetes      Relation: Paternal Grandfather          Age of Onset: (Not Specified)      Social History    Socioeconomic History      Marital status:     Tobacco Use      Smoking status: Never Smoker    Substance and Sexual Activity      Alcohol use: No      Drug use: No      Sexual activity: Yes        Partners: Male      Current Outpatient Medications:  amLODIPine (NORVASC) 5 MG tablet, , Disp: , Rfl:   hydroCHLOROthiazide (HYDRODIURIL) 25 MG tablet, , Disp: , Rfl:   LORazepam (ATIVAN) 1 MG tablet, Take 1 mg by mouth as needed for Anxiety., Disp: , Rfl:   losartan (COZAAR) 50 MG tablet, Take 50 mg by mouth every morning., Disp: , Rfl: 1  metFORMIN (GLUCOPHAGE) 500 MG tablet, Take 500 mg by mouth., Disp: , Rfl:   NURTEC 75 mg odt, Take by mouth., Disp: , Rfl:   rosuvastatin (CRESTOR) 10 MG tablet, Take 10 mg by mouth every evening., Disp: , Rfl: 1  rosuvastatin (CRESTOR) 5 MG tablet, Take 5 mg by mouth., Disp: , Rfl:   TRESIBA FLEXTOUCH U-100 100 unit/mL (3 mL) insulin pen, SMARTSI Unit(s) SUB-Q Every Morning, Disp: , Rfl:   amLODIPine (NORVASC) 5 MG tablet, Take 5 mg by mouth., Disp: , Rfl:   FREESTYLE LANCETS lancets, 6 strips. , Disp: , Rfl:   hyoscyamine (ANASPAZ,LEVSIN) 0.125 mg Tab, Take 1 tablet (125 mcg total) by mouth every 6 (six) hours as needed., Disp: 20 tablet, Rfl: 0  pantoprazole (PROTONIX) 40 MG tablet, Take 1 tablet (40 mg total) by mouth once daily., Disp: 90 tablet, Rfl: 1  ranitidine (ZANTAC) 150 MG tablet, Take 1 tablet (150 mg total) by mouth 2 (two) times daily., Disp: 180 tablet, Rfl: 1  semaglutide (OZEMPIC) 1 mg/dose (2 mg/1.5 mL) PnIj, Inject 1 mg subq weekly (Patient not taking: Reported on 2022), Disp: 1.5 mL, Rfl: 3    No current facility-administered medications for this visit.      Review of patient's allergies indicates:   -- Penicillins -- Itching and Anaphylaxis   -- Sulfa (sulfonamide antibiotics) -- Anaphylaxis   -- Meperidine --  Itching    ROS    Objective:      Physical Exam   Constitutional: She is oriented to person, place, and time.  Non-toxic appearance. No distress.   HENT:   Head: Normocephalic and atraumatic.   Cardiovascular: Normal rate.   Pulmonary/Chest: Effort normal. No respiratory distress.   Abdominal: Normal appearance.   Neurological: no focal deficit. She is alert and oriented to person, place, and time.   Skin: Skin is rash, not nodular, not pustular, not purpuric, not vesicular, maculopapular and no abscessed.        Psychiatric: Her behavior is normal. Mood normal.         Assessment:       1. Mixed stress and urge urinary incontinence    2. Rash    3. Nausea          Plan:     Pepcid and zyrtec for rash. Zofran as needed for nausea.  Follow up with PCP within 1 week.  See dermatology if rash not resolving.  Urology consult ordered for ongoing urinary incontinence, they will call you to schedule.  Keep bariatric follow up as scheduled.    Glucose 140, decadron 4 mg given. UA shows 10 rbc, 30 protein, no leukocytes or bacteria, results reviewed with patient.   Mixed stress and urge urinary incontinence  -     Ambulatory referral/consult to Urology  -     POCT Urinalysis, Dipstick, Automated, W/O Scope    Rash  -     POCT Glucose, Hand-Held Device  -     Ambulatory referral/consult to Dermatology  -     dexamethasone injection 4 mg  -     famotidine (PEPCID) 40 MG tablet; Take 1 tablet (40 mg total) by mouth once daily.  Dispense: 30 tablet; Refill: 11  -     cetirizine (ZYRTEC) 10 MG tablet; Take 1 tablet (10 mg total) by mouth once daily. for 10 days  Dispense: 10 tablet; Refill: 0    Nausea  -     famotidine (PEPCID) 40 MG tablet; Take 1 tablet (40 mg total) by mouth once daily.  Dispense: 30 tablet; Refill: 11  -     ondansetron (ZOFRAN) 8 MG tablet; Take 1 tablet (8 mg total) by mouth every 8 (eight) hours as needed for Nausea.  Dispense: 30 tablet; Refill: 0

## 2022-02-25 ENCOUNTER — OFFICE VISIT (OUTPATIENT)
Dept: BARIATRICS | Facility: CLINIC | Age: 52
End: 2022-02-25
Payer: COMMERCIAL

## 2022-02-25 VITALS
WEIGHT: 216.06 LBS | HEIGHT: 65 IN | SYSTOLIC BLOOD PRESSURE: 142 MMHG | DIASTOLIC BLOOD PRESSURE: 78 MMHG | BODY MASS INDEX: 36 KG/M2 | OXYGEN SATURATION: 97 % | HEART RATE: 78 BPM

## 2022-02-25 DIAGNOSIS — Z79.4 TYPE 2 DIABETES MELLITUS WITHOUT COMPLICATION, WITH LONG-TERM CURRENT USE OF INSULIN: ICD-10-CM

## 2022-02-25 DIAGNOSIS — E11.9 TYPE 2 DIABETES MELLITUS WITHOUT COMPLICATION, WITH LONG-TERM CURRENT USE OF INSULIN: ICD-10-CM

## 2022-02-25 DIAGNOSIS — E66.01 CLASS 2 SEVERE OBESITY WITH BODY MASS INDEX (BMI) OF 35 TO 39.9 WITH SERIOUS COMORBIDITY: Primary | ICD-10-CM

## 2022-02-25 DIAGNOSIS — K21.9 GASTROESOPHAGEAL REFLUX DISEASE, UNSPECIFIED WHETHER ESOPHAGITIS PRESENT: ICD-10-CM

## 2022-02-25 DIAGNOSIS — Z98.84 S/P LAPAROSCOPIC SLEEVE GASTRECTOMY: ICD-10-CM

## 2022-02-25 PROCEDURE — 3077F PR MOST RECENT SYSTOLIC BLOOD PRESSURE >= 140 MM HG: ICD-10-PCS | Mod: CPTII,S$GLB,, | Performed by: INTERNAL MEDICINE

## 2022-02-25 PROCEDURE — 1160F PR REVIEW ALL MEDS BY PRESCRIBER/CLIN PHARMACIST DOCUMENTED: ICD-10-PCS | Mod: CPTII,S$GLB,, | Performed by: INTERNAL MEDICINE

## 2022-02-25 PROCEDURE — 99214 OFFICE O/P EST MOD 30 MIN: CPT | Mod: S$GLB,,, | Performed by: INTERNAL MEDICINE

## 2022-02-25 PROCEDURE — 1159F MED LIST DOCD IN RCRD: CPT | Mod: CPTII,S$GLB,, | Performed by: INTERNAL MEDICINE

## 2022-02-25 PROCEDURE — 99214 PR OFFICE/OUTPT VISIT, EST, LEVL IV, 30-39 MIN: ICD-10-PCS | Mod: S$GLB,,, | Performed by: INTERNAL MEDICINE

## 2022-02-25 PROCEDURE — 3077F SYST BP >= 140 MM HG: CPT | Mod: CPTII,S$GLB,, | Performed by: INTERNAL MEDICINE

## 2022-02-25 PROCEDURE — 1159F PR MEDICATION LIST DOCUMENTED IN MEDICAL RECORD: ICD-10-PCS | Mod: CPTII,S$GLB,, | Performed by: INTERNAL MEDICINE

## 2022-02-25 PROCEDURE — 3008F BODY MASS INDEX DOCD: CPT | Mod: CPTII,S$GLB,, | Performed by: INTERNAL MEDICINE

## 2022-02-25 PROCEDURE — 99999 PR PBB SHADOW E&M-EST. PATIENT-LVL V: CPT | Mod: PBBFAC,,, | Performed by: INTERNAL MEDICINE

## 2022-02-25 PROCEDURE — 3078F PR MOST RECENT DIASTOLIC BLOOD PRESSURE < 80 MM HG: ICD-10-PCS | Mod: CPTII,S$GLB,, | Performed by: INTERNAL MEDICINE

## 2022-02-25 PROCEDURE — 99999 PR PBB SHADOW E&M-EST. PATIENT-LVL V: ICD-10-PCS | Mod: PBBFAC,,, | Performed by: INTERNAL MEDICINE

## 2022-02-25 PROCEDURE — 3008F PR BODY MASS INDEX (BMI) DOCUMENTED: ICD-10-PCS | Mod: CPTII,S$GLB,, | Performed by: INTERNAL MEDICINE

## 2022-02-25 PROCEDURE — 1160F RVW MEDS BY RX/DR IN RCRD: CPT | Mod: CPTII,S$GLB,, | Performed by: INTERNAL MEDICINE

## 2022-02-25 PROCEDURE — 3078F DIAST BP <80 MM HG: CPT | Mod: CPTII,S$GLB,, | Performed by: INTERNAL MEDICINE

## 2022-02-25 RX ORDER — SEMAGLUTIDE 2.4 MG/.75ML
2.4 INJECTION, SOLUTION SUBCUTANEOUS
Qty: 3 ML | Refills: 0 | Status: SHIPPED | OUTPATIENT
Start: 2022-06-26

## 2022-02-25 RX ORDER — SEMAGLUTIDE 0.5 MG/.5ML
0.5 INJECTION, SOLUTION SUBCUTANEOUS
Qty: 2 ML | Refills: 0 | Status: SHIPPED | OUTPATIENT
Start: 2022-03-28

## 2022-02-25 RX ORDER — CLOTRIMAZOLE 1 %
CREAM (GRAM) TOPICAL
COMMUNITY
Start: 2022-02-18 | End: 2022-08-08

## 2022-02-25 RX ORDER — SEMAGLUTIDE 0.25 MG/.5ML
0.25 INJECTION, SOLUTION SUBCUTANEOUS
Qty: 2 ML | Refills: 0 | Status: SHIPPED | OUTPATIENT
Start: 2022-02-25

## 2022-02-25 RX ORDER — SEMAGLUTIDE 1.7 MG/.75ML
1.7 INJECTION, SOLUTION SUBCUTANEOUS
Qty: 3 ML | Refills: 0 | Status: SHIPPED | OUTPATIENT
Start: 2022-05-26

## 2022-02-25 RX ORDER — KETOCONAZOLE 20 MG/G
CREAM TOPICAL 2 TIMES DAILY
COMMUNITY
Start: 2022-01-27

## 2022-02-25 RX ORDER — SEMAGLUTIDE 1 MG/.5ML
1 INJECTION, SOLUTION SUBCUTANEOUS
Qty: 2 ML | Refills: 0 | Status: SHIPPED | OUTPATIENT
Start: 2022-04-27

## 2022-02-25 RX ORDER — PANTOPRAZOLE SODIUM 40 MG/1
40 TABLET, DELAYED RELEASE ORAL DAILY
Qty: 90 TABLET | Refills: 1 | Status: SHIPPED | OUTPATIENT
Start: 2022-02-25 | End: 2022-03-27

## 2022-02-25 NOTE — PROGRESS NOTES
"Subjective:       Patient ID: Meg Lockhart is a 51 y.o. female.    Chief Complaint: Follow-up    Pt here today for follow-up. Last seen in 2019 and has gained 12 lbs in that time. Net pos 10 lbs..   Is off ozempic and on tresiba 16 units daily.  Does complain of reflux. Keeps her up at night.Has a cough. SHe did not have reflux prior to sleeve. Has been taking pepcid with limited relief. Has also tried OTC omeprazole without relief.    Lab Results  Last A1c 8% recently with A1c.       Component                Value               Date                       HGBA1C                   11.1 (H)            06/20/2013                 HGBA1C                   10.2 (H)            02/28/2013            Lab Results       Component                Value               Date                       LDLCALC                  141.0               02/28/2013                 CREATININE               0.7                 06/14/2021                 Follow-up  Associated symptoms include arthralgias, diaphoresis, headaches, nausea and vomiting. Pertinent negatives include no chest pain, chills or fever.     Review of Systems   Constitutional: Positive for diaphoresis. Negative for chills and fever.   Respiratory: Positive for shortness of breath.         + snores. Denies H/o CHI   Cardiovascular: Negative for chest pain and leg swelling.   Gastrointestinal: Positive for nausea and vomiting.        + gerd   Endocrine: Positive for cold intolerance.   Genitourinary: Negative for difficulty urinating.        S/p hyst   Musculoskeletal: Positive for arthralgias and back pain.   Neurological: Positive for dizziness and headaches. Negative for light-headedness.   Psychiatric/Behavioral: Positive for decreased concentration. Negative for dysphoric mood. The patient is nervous/anxious.        Objective:     BP (!) 142/78 (BP Location: Left arm, Patient Position: Sitting, BP Method: Large (Manual))   Pulse 78   Ht 5' 5" (1.651 m)   Wt 98 kg " (216 lb 0.8 oz)   SpO2 97%   BMI 35.95 kg/m²     Physical Exam  Vitals reviewed.   Constitutional:       General: She is not in acute distress.     Appearance: She is well-developed.      Comments: obese   HENT:      Head: Normocephalic and atraumatic.   Eyes:      General: No scleral icterus.     Pupils: Pupils are equal, round, and reactive to light.   Cardiovascular:      Rate and Rhythm: Normal rate.   Pulmonary:      Effort: Pulmonary effort is normal.   Musculoskeletal:         General: Normal range of motion.      Cervical back: Normal range of motion and neck supple.   Skin:     General: Skin is warm and dry.      Findings: No erythema.   Neurological:      Mental Status: She is alert and oriented to person, place, and time.      Cranial Nerves: No cranial nerve deficit.   Psychiatric:         Behavior: Behavior normal.         Judgment: Judgment normal.         Assessment:       1. Class 2 severe obesity with body mass index (BMI) of 35 to 39.9 with serious comorbidity    2. Gastroesophageal reflux disease, unspecified whether esophagitis present    3. Type 2 diabetes mellitus without complication, with long-term current use of insulin    4. S/P laparoscopic sleeve gastrectomy        Plan:         Meg was seen today for follow-up.    Diagnoses and all orders for this visit:    Class 2 severe obesity with body mass index (BMI) of 35 to 39.9 with serious comorbidity  -     semaglutide, weight loss, (WEGOVY) 0.25 mg/0.5 mL PnIj; Inject 0.25 mg into the skin every 7 days.  -     semaglutide, weight loss, (WEGOVY) 0.5 mg/0.5 mL PnIj; Inject 0.5 mg into the skin every 7 days.  -     semaglutide, weight loss, (WEGOVY) 1 mg/0.5 mL PnIj; Inject 1 mg into the skin every 7 days.  -     semaglutide, weight loss, (WEGOVY) 1.7 mg/0.75 mL PnIj; Inject 1.7 mg into the skin every 7 days.  -     semaglutide, weight loss, (WEGOVY) 2.4 mg/0.75 mL PnIj; Inject 2.4 mg into the skin every 7 days.  -     Ambulatory  referral/consult to Bariatric Surgery; Future    Gastroesophageal reflux disease, unspecified whether esophagitis present  -     pantoprazole (PROTONIX) 40 MG tablet; Take 1 tablet (40 mg total) by mouth once daily.  -     Ambulatory referral/consult to Bariatric Surgery; Future    Type 2 diabetes mellitus without complication, with long-term current use of insulin    S/P laparoscopic sleeve gastrectomy  -     Ambulatory referral/consult to Bariatric Surgery; Future        Start Wegovy 0.25 mg once a week x 1 month. At the end of that month, the dose will continue to increase monthly.       Decrease portions as soon as you start wegovy. Avoid fried, rich or greasy foods.      Some nausea in the first 2 weeks is not unusual.     If you get pain across the upper abdomen and around to your back, please call the office.       [employer] to sign up for coupon card.       Pt advised to check blood sugar regularly as medications may need to be adjusted with changes in diet and weight loss.       - To lose weight you want to cut 100% starchy carbohydrates out of your diet (bread, rice, pasta, potatoes, granola, flour, corn, peas, oatmeal, grits, tortillas, crackers, chips) and get  grams of protein.  Aim for 100 grams of protein daily.    - No soda, sweet tea, juices, sports drinks or lemonade     -Exercise daily      Soft diet instructions given. Pt to follow for a couple of weeks to help GERD. Will also refer her back to surgery to discuss revision.

## 2022-02-25 NOTE — PATIENT INSTRUCTIONS
Start Wegovy 0.25 mg once a week x 1 month. At the end of that month, the dose will continue to increase monthly.       Decrease portions as soon as you start wegovy. Avoid fried, rich or greasy foods.      Some nausea in the first 2 weeks is not unusual.     If you get pain across the upper abdomen and around to your back, please call the office.       Www[employer] to sign up for coupon card.       Pt advised to check blood sugar regularly as medications may need to be adjusted with changes in diet and weight loss.       - To lose weight you want to cut 100% starchy carbohydrates out of your diet (bread, rice, pasta, potatoes, granola, flour, corn, peas, oatmeal, grits, tortillas, crackers, chips) and get  grams of protein.  Aim for 100 grams of protein daily.    - No soda, sweet tea, juices, sports drinks or lemonade     -Exercise daily    - Premier Protein (Chocolate, Bananas & Cream, Strawberries & Cream, Vanilla) [employer] or [employer]    - Syntrax Alba from [employer], www.bariatricadvantage.com, www.bariatricchoice.com. (LACTOSE FREE)    - [employer] - [employer].com (LACTOSE FREE)    - Veggetti Pro from "[employer], [employer]    - www.pinterest.com (cauliflower, cloud bread, quest bar cookies, eggplant, zucchini, zucchini noodles, crustless quiche, no carb meals, taco lettuce boats)    - http://thesuzieingjen.Vac[employer] Way.com/    Bariatric Soft Diet           Start Soft Diet 4 weeks after gastric bypass and sleeve    As your stomach heals, your doctor will progress your diet to soft foods.  This diet usually lasts for 1-2 months, but can last longer depending on each individual. Soft foods are those which can be easily mashed with a fork.    Remember these principles:  No liquids with meals. Do no drink 30 minutes before meals and wait 30 minutes to 1 hour after meals to start drinking.  Sip on water, sugar-free beverages or non-fat milk throughout the day.  You will need to continue  drinking at least 1 protein drink daily to meet protein needs.  100% fruit juice (no sugar added) is allowed, but limit to 4oz a day because it is high in calories and does not contain any protein.  Chew foods slowly; one meal should take 20-30 minutes.  Eat 3-5 meals per day, without any additional snacking.  Stop eating as soon as you feel full.  Avoid using table sugar and foods made with refined sugar, which can trigger dumping syndrome.  Marinating meats with a low sugar marinade, adding low-fat salad dressing, or adding low calorie gravy (made from powder and water) can help meats to digest easier.     Adding Vegetables and Fruits:    As long as you are consuming >80g total protein daily from combination of foods and protein drinks, you may start adding small bites of fruits and vegetables to your meals. Cooked, tender vegetables and ripe fruits without the peel are tolerated best.    Avoid fruit canned in syrup, sugary fruit juices, and vegetables cooked with oil, butter or davidson.  Bariatric SOFT Diet    EAT THESE FOODS AVOID THESE FOODS   High in Protein: High in Fat/Sugar:   Canned tuna or chicken (packed in water)  Lean ground turkey breast or ground round  Turkey or chicken (no skin); cooked tender and cut in small pieces  Lean pork or beef (cook in crock pot until very tender; cut in small pieces  Scrambled, poached, or boiled eggs  Baked, broiled, grilled or boiled fish and seafood (not fried!)  Silken tofu, Edamame (soybeans)  Beans, hummus and lentils  Lean deli meats (turkey and chicken breast, ham, roast beef)  1% or Skim Milk, Lactaid, or Soymilk  Low-fat or fat-free cottage cheese, soft cheese, mozzarella string cheese, or ricotta  Light yogurt, Greek yogurt, SF pudding High fat milk (whole, 2%)  Butter, margarine, oil, mayonnaise  Sour cream, cream cheese, salad dressing  Ice Cream  Cakes, cookies, pies, desserts  Candy  Luncheon meats (bologna, salami, chopped ham)  Sausage, Davidson  Gravy  Fried  Foods  ___________________________________  Tough/Crunchy--------------------------------  Tough or dry meats  Corn   Granola/cereal with nuts  Shredded Coconut    May add after 2 months:  Raw veggies  Lettuce  Plain, Unsalted Nuts and Seeds  Protein bars with 0-4 grams of sugar   As long as you are getting >80g PRO: Starchy Carbohydrates. At goal weight, some may include whole grains in small amounts.   Cooked tender vegetables without peel  Ripe fruits without peel  Frozen fruits with no added sugar  Fruit canned in its own juice or in water  Fat free, sugar free, frozen yogurt White and wheat Bread, Rice, Pasta   Cereals (including grits, oatmeal)   Crackers, Pretzels, Chips, Granola  Corn, Popcorn, Peas  White Potatoes, Sweet potatoes  Flour and corn tortillas     Fluids: Always Avoid:   Skim/1% milk, Lactaid, Soymilk  Water and Sugar-free beverages  (decaf and non-carbonated)  Decaf coffee & decaf tea  Sugary drinks  Carbonated drinks  Alcohol  Drinking through straws           Sample Menu for Bariatric Soft Diet  For Gastric Bypass and Sleeve            3 meals + 2 protein drinks  Remember: No drinking with meals.    Time of Day Day 1 Day 2   7a:    1 egg (or ¼ cup Egg Beaters) ¼ cup low-fat cottage cheese, 1 tbsp berries   7:30a-9:30a: 1 cup water/SF beverage     10a:  Protein drink  Protein drink   10:30a-11:30a: 1 cup water/SF beverage     12p:    1-2 oz grilled shrimp, ¼ cup green beans   1-2oz canned chicken, shredded cheese, 1 tbsp salsa   12:30p-2:30p: 1 cup water/SF beverage     3p:  Protein drink   Protein drink   3:30p-5:30p: 1 cup water/SF beverage     6p:  ½ cup low fat chili, 1oz low-fat cheese, ¼ cup broccoli 2 oz grilled fish,  ¼ cup lima beans   6:30p-9p: 1 cup water/SF beverage       This sample menu provides approx. 80g protein total, including about 40g protein from foods and at least 40g protein from protein drinks.  Drinking protein drinks daily helps decrease muscle loss, increase weight  loss, and prevent hair loss.    Sip fluids continuously in between meals.    For fluids: 1 cup = 8 oz   For food: ¼ cup = 4 tablespoons = 1oz  No drinking from 30 minutes before meals to 30 minutes after meals.  3oz meat is approx. the size of a deck of cards.    A food scale will help you determine portion size (Can be purchased at [employer])           97.5

## 2022-02-28 ENCOUNTER — TELEPHONE (OUTPATIENT)
Dept: PHARMACY | Facility: CLINIC | Age: 52
End: 2022-02-28
Payer: COMMERCIAL

## 2022-03-09 ENCOUNTER — OFFICE VISIT (OUTPATIENT)
Dept: CARDIOLOGY | Facility: CLINIC | Age: 52
End: 2022-03-09
Payer: COMMERCIAL

## 2022-03-09 VITALS
OXYGEN SATURATION: 98 % | HEIGHT: 65 IN | DIASTOLIC BLOOD PRESSURE: 94 MMHG | SYSTOLIC BLOOD PRESSURE: 162 MMHG | BODY MASS INDEX: 35.55 KG/M2 | HEART RATE: 91 BPM | WEIGHT: 213.38 LBS

## 2022-03-09 DIAGNOSIS — E78.2 MIXED HYPERLIPIDEMIA: ICD-10-CM

## 2022-03-09 DIAGNOSIS — E11.9 DIABETES MELLITUS WITHOUT COMPLICATION: Primary | ICD-10-CM

## 2022-03-09 DIAGNOSIS — R29.818 SUSPECTED SLEEP APNEA: ICD-10-CM

## 2022-03-09 DIAGNOSIS — I10 PRIMARY HYPERTENSION: ICD-10-CM

## 2022-03-09 PROCEDURE — 1159F PR MEDICATION LIST DOCUMENTED IN MEDICAL RECORD: ICD-10-PCS | Mod: CPTII,S$GLB,, | Performed by: INTERNAL MEDICINE

## 2022-03-09 PROCEDURE — 4010F PR ACE/ARB THEARPY RXD/TAKEN: ICD-10-PCS | Mod: CPTII,S$GLB,, | Performed by: INTERNAL MEDICINE

## 2022-03-09 PROCEDURE — 3008F BODY MASS INDEX DOCD: CPT | Mod: CPTII,S$GLB,, | Performed by: INTERNAL MEDICINE

## 2022-03-09 PROCEDURE — 99999 PR PBB SHADOW E&M-EST. PATIENT-LVL V: ICD-10-PCS | Mod: PBBFAC,,, | Performed by: INTERNAL MEDICINE

## 2022-03-09 PROCEDURE — 1159F MED LIST DOCD IN RCRD: CPT | Mod: CPTII,S$GLB,, | Performed by: INTERNAL MEDICINE

## 2022-03-09 PROCEDURE — 3077F PR MOST RECENT SYSTOLIC BLOOD PRESSURE >= 140 MM HG: ICD-10-PCS | Mod: CPTII,S$GLB,, | Performed by: INTERNAL MEDICINE

## 2022-03-09 PROCEDURE — 3077F SYST BP >= 140 MM HG: CPT | Mod: CPTII,S$GLB,, | Performed by: INTERNAL MEDICINE

## 2022-03-09 PROCEDURE — 99999 PR PBB SHADOW E&M-EST. PATIENT-LVL V: CPT | Mod: PBBFAC,,, | Performed by: INTERNAL MEDICINE

## 2022-03-09 PROCEDURE — 3080F DIAST BP >= 90 MM HG: CPT | Mod: CPTII,S$GLB,, | Performed by: INTERNAL MEDICINE

## 2022-03-09 PROCEDURE — 3008F PR BODY MASS INDEX (BMI) DOCUMENTED: ICD-10-PCS | Mod: CPTII,S$GLB,, | Performed by: INTERNAL MEDICINE

## 2022-03-09 PROCEDURE — 99214 OFFICE O/P EST MOD 30 MIN: CPT | Mod: S$GLB,,, | Performed by: INTERNAL MEDICINE

## 2022-03-09 PROCEDURE — 3080F PR MOST RECENT DIASTOLIC BLOOD PRESSURE >= 90 MM HG: ICD-10-PCS | Mod: CPTII,S$GLB,, | Performed by: INTERNAL MEDICINE

## 2022-03-09 PROCEDURE — 4010F ACE/ARB THERAPY RXD/TAKEN: CPT | Mod: CPTII,S$GLB,, | Performed by: INTERNAL MEDICINE

## 2022-03-09 PROCEDURE — 99214 PR OFFICE/OUTPT VISIT, EST, LEVL IV, 30-39 MIN: ICD-10-PCS | Mod: S$GLB,,, | Performed by: INTERNAL MEDICINE

## 2022-03-09 RX ORDER — AMLODIPINE BESYLATE 10 MG/1
5 TABLET ORAL DAILY
Qty: 90 TABLET | Refills: 3 | Status: SHIPPED | OUTPATIENT
Start: 2022-03-09

## 2022-03-09 RX ORDER — CHLORTHALIDONE 25 MG/1
25 TABLET ORAL DAILY
Qty: 30 TABLET | Refills: 11 | Status: SHIPPED | OUTPATIENT
Start: 2022-03-09 | End: 2023-03-09

## 2022-03-09 NOTE — PROGRESS NOTES
Subjective:   Patient ID:  Meg Lockhart is a 51 y.o. female who presents for evaluation of Hypertension    PROBLEM LIST:  HTN  HLD  DM, not on insulin      HPI:  She presents today for evaluation and management of hypertension.  She has had hypertension for a number of years which has been treated with a calcium channel blocker, thiazide diuretic, and ARB.  Her blood pressure has remained controlled with readings of 160-170/.  She also has treated diabetes and hyperlipidemia.  She is not currently following a low-sodium diet.  She underwent gastric sleeve procedure in 2019 and had lost 70 lbs. She has not been able to exercise over the past year due to ongoing weakness which she attributes to her COVID vaccination last March.  She complains of chronic fatigue and daily morning headaches.  She does snore as well as stop breathing according to her .  She has not had a sleep evaluation.  She is not taking any NSAIDs or decongestants.  She has been having ongoing issues with vomiting since her bariatric surgery and has had difficulty in the past keeping her pills down.    ECG 12/30/19:  Personally reviewed by me shows normal sinus rhythm at 77 beats per minute.    Past Medical History:   Diagnosis Date    Anxiety     Diabetes mellitus     Elevated cholesterol     GERD (gastroesophageal reflux disease)     Hypertension     PUD (peptic ulcer disease)        Past Surgical History:   Procedure Laterality Date    GASTRIC BYPASS      HYSTERECTOMY         Social History     Socioeconomic History    Marital status:    Tobacco Use    Smoking status: Never Smoker    Smokeless tobacco: Never Used   Substance and Sexual Activity    Alcohol use: No    Drug use: No    Sexual activity: Yes     Partners: Male       Family History   Problem Relation Age of Onset    Heart disease Mother     Hypertension Mother     Heart attack Mother     Stroke Brother     Cancer Maternal Aunt     Cancer  Maternal Grandmother         lung    Heart disease Maternal Grandmother     Diabetes Paternal Grandmother     Heart disease Paternal Grandmother     Diabetes Paternal Grandfather        Patient's Medications   New Prescriptions    CHLORTHALIDONE (HYGROTEN) 25 MG TAB    Take 1 tablet (25 mg total) by mouth once daily.   Previous Medications    CETIRIZINE (ZYRTEC) 10 MG TABLET    Take 1 tablet (10 mg total) by mouth once daily. for 10 days    CLOTRIMAZOLE (LOTRIMIN) 1 % CREAM    Apply topically.    FAMOTIDINE (PEPCID) 40 MG TABLET    Take 1 tablet (40 mg total) by mouth once daily.    FREESTYLE LANCETS LANCETS    6 strips.     HYOSCYAMINE (ANASPAZ,LEVSIN) 0.125 MG TAB    Take 1 tablet (125 mcg total) by mouth every 6 (six) hours as needed.    KETOCONAZOLE (NIZORAL) 2 % CREAM    Apply topically 2 (two) times daily.    LORAZEPAM (ATIVAN) 1 MG TABLET    Take 1 mg by mouth as needed for Anxiety.    LOSARTAN (COZAAR) 50 MG TABLET    Take 50 mg by mouth every morning.    METFORMIN (GLUCOPHAGE) 500 MG TABLET    Take 500 mg by mouth.    NURTEC 75 MG ODT    Take by mouth.    ONDANSETRON (ZOFRAN) 8 MG TABLET    Take 1 tablet (8 mg total) by mouth every 8 (eight) hours as needed for Nausea.    PANTOPRAZOLE (PROTONIX) 40 MG TABLET    Take 1 tablet (40 mg total) by mouth once daily.    ROSUVASTATIN (CRESTOR) 10 MG TABLET    Take 10 mg by mouth every evening.    SEMAGLUTIDE, WEIGHT LOSS, (WEGOVY) 0.25 MG/0.5 ML PNIJ    Inject 0.25 mg into the skin every 7 days.    SEMAGLUTIDE, WEIGHT LOSS, (WEGOVY) 0.5 MG/0.5 ML PNIJ    Inject 0.5 mg into the skin every 7 days.    SEMAGLUTIDE, WEIGHT LOSS, (WEGOVY) 1 MG/0.5 ML PNIJ    Inject 1 mg into the skin every 7 days.    SEMAGLUTIDE, WEIGHT LOSS, (WEGOVY) 1.7 MG/0.75 ML PNIJ    Inject 1.7 mg into the skin every 7 days.    SEMAGLUTIDE, WEIGHT LOSS, (WEGOVY) 2.4 MG/0.75 ML PNIJ    Inject 2.4 mg into the skin every 7 days.    TRESIBA FLEXTOUCH U-100 100 UNIT/ML (3 ML) INSULIN PEN     "SMARTSI Unit(s) SUB-Q Every Morning   Modified Medications    Modified Medication Previous Medication    AMLODIPINE (NORVASC) 10 MG TABLET amLODIPine (NORVASC) 5 MG tablet       Take 0.5 tablets (5 mg total) by mouth once daily.    Take 5 mg by mouth.   Discontinued Medications    AMLODIPINE (NORVASC) 5 MG TABLET        HYDROCHLOROTHIAZIDE (HYDRODIURIL) 25 MG TABLET           Review of Systems   Constitutional: Positive for malaise/fatigue. Negative for weight gain.   HENT: Negative for hearing loss.    Eyes: Negative for visual disturbance.   Cardiovascular: Negative for chest pain, claudication, dyspnea on exertion, leg swelling, near-syncope, orthopnea, palpitations, paroxysmal nocturnal dyspnea and syncope.   Respiratory: Positive for sleep disturbances due to breathing and snoring. Negative for cough, shortness of breath and wheezing.    Endocrine: Negative for cold intolerance, heat intolerance, polydipsia, polyphagia and polyuria.   Hematologic/Lymphatic: Negative for bleeding problem. Does not bruise/bleed easily.   Skin: Negative for rash and suspicious lesions.   Musculoskeletal: Negative for arthritis, falls, joint pain, muscle weakness and myalgias.   Gastrointestinal: Positive for vomiting. Negative for abdominal pain, change in bowel habit, constipation, diarrhea, heartburn, hematochezia, melena and nausea.   Genitourinary: Negative for hematuria and nocturia.   Neurological: Negative for excessive daytime sleepiness, dizziness, headaches, light-headedness, loss of balance and weakness.   Psychiatric/Behavioral: Negative for depression. The patient is not nervous/anxious.    Allergic/Immunologic: Negative for environmental allergies.       BP (!) 162/94   Pulse 91   Ht 5' 5" (1.651 m)   Wt 96.8 kg (213 lb 6.5 oz)   SpO2 98%   BMI 35.51 kg/m²     Objective:   Physical Exam  Constitutional:       Appearance: She is well-developed.      Comments:      HENT:      Head: Normocephalic and " atraumatic.   Eyes:      General: No scleral icterus.     Conjunctiva/sclera: Conjunctivae normal.      Pupils: Pupils are equal, round, and reactive to light.   Neck:      Thyroid: No thyromegaly.      Vascular: No hepatojugular reflux or JVD.      Trachea: No tracheal deviation.   Cardiovascular:      Rate and Rhythm: Normal rate and regular rhythm.      Chest Wall: PMI is not displaced.      Pulses: Intact distal pulses.           Carotid pulses are 2+ on the right side and 2+ on the left side.       Radial pulses are 2+ on the right side and 2+ on the left side.        Dorsalis pedis pulses are 2+ on the right side and 2+ on the left side.        Posterior tibial pulses are 2+ on the right side and 2+ on the left side.      Heart sounds: Normal heart sounds.   Pulmonary:      Effort: Pulmonary effort is normal.      Breath sounds: Normal breath sounds.   Abdominal:      General: Bowel sounds are normal. There is no distension.      Palpations: Abdomen is soft. There is no mass.      Tenderness: There is no abdominal tenderness.   Musculoskeletal:         General: No tenderness.      Cervical back: Normal range of motion and neck supple.   Lymphadenopathy:      Cervical: No cervical adenopathy.   Skin:     General: Skin is warm and dry.      Findings: No erythema or rash.      Nails: There is no clubbing.   Neurological:      Mental Status: She is alert and oriented to person, place, and time.   Psychiatric:         Speech: Speech normal.         Behavior: Behavior normal.         Lab Results   Component Value Date     06/14/2021    K 4.0 06/14/2021     06/14/2021    CO2 24 06/14/2021    BUN 13 06/14/2021    CREATININE 0.7 06/14/2021     (H) 06/14/2021    HGBA1C 11.1 (H) 06/20/2013    AST 16 06/14/2021    ALT 14 06/14/2021    ALBUMIN 3.8 06/14/2021    PROT 7.2 06/14/2021    BILITOT 0.6 06/14/2021    WBC 7.70 06/14/2021    HGB 11.4 (L) 06/14/2021    HCT 35.9 (L) 06/14/2021    MCV 92 06/14/2021      06/14/2021    TSH 0.916 02/28/2013    CHOL 220 (H) 02/28/2013    HDL 54 02/28/2013    LDLCALC 141.0 02/28/2013    TRIG 126 02/28/2013       Assessment:     1. Diabetes mellitus without complication :  On metformin.   2. Primary hypertension :  She has uncontrolled stage II hypertension.  I have asked her to increase her amlodipine to 10 mg daily and change her hydrochlorothiazide to chlorthalidone 25 mg daily.  We discussed limiting her daily sodium intake to less than 1500 mg and I gave her a copy of the dash diet.  We also discussed starting a graded exercise program with a goal of reaching moderate intensity exercise for 30 minutes a day 5 days a week.  I am also referring her to sleep medicine for a sleep study to assess for obstructive sleep apnea. We discussed keeping a log of home blood pressures and notifying the office if it is consistently running above 130/80 mmHg. I have asked him/her to send me his/her readings via [facility] in 1-2 weeks. We discussed keeping a log of home blood pressures and notifying the office if it is consistently running above 130/80 mmHg. I have asked him/her to send me his/her readings via [facility] in 1-2 weeks.   3. Mixed hyperlipidemia :  She is on rosuvastatin 10 mg daily.  She had myalgias on higher dose in the past.  I have requested her recent blood work from her primary care physician.   4. Suspected sleep apnea        Plan:     Meg was seen today for hypertension.    Diagnoses and all orders for this visit:    Diabetes mellitus without complication    Primary hypertension  -     amLODIPine (NORVASC) 10 MG tablet; Take 0.5 tablets (5 mg total) by mouth once daily.  -     chlorthalidone (HYGROTEN) 25 MG Tab; Take 1 tablet (25 mg total) by mouth once daily.  -     Echo; Future  -     Cancel: Basic Metabolic Panel; Future  -     Ambulatory referral/consult to Sleep Disorders; Future  -     Basic Metabolic Panel; Future  -     Basic Metabolic Panel    Mixed  hyperlipidemia    Suspected sleep apnea  -     Ambulatory referral/consult to Sleep Disorders; Future        Thank you for allowing me to participate in this patient's care. Please do not hesitate to contact me with any questions or concerns.

## 2022-03-09 NOTE — PATIENT INSTRUCTIONS
Increase your amlodipine to 10 mg daily.    Replace HCTZ with chlorthalidone 25 mg daily.    We discussed keeping a log of home blood pressures and notifying the office if it is consistently running above 130/80 mmHg. I have asked him/her to send me his/her readings via [facility] in 1-2 weeks.    Limit sodium intake to < 1500mg daily and follow the DASH diet plan.    We discussed that 150 minutes a week of moderate intensity exercise is recommended to improve cardiovascular health.

## 2022-03-17 ENCOUNTER — TELEPHONE (OUTPATIENT)
Dept: CARDIOLOGY | Facility: CLINIC | Age: 52
End: 2022-03-17
Payer: COMMERCIAL

## 2022-03-17 ENCOUNTER — PATIENT MESSAGE (OUTPATIENT)
Dept: CARDIOLOGY | Facility: CLINIC | Age: 52
End: 2022-03-17
Payer: COMMERCIAL

## 2022-03-17 RX ORDER — ROSUVASTATIN CALCIUM 20 MG/1
20 TABLET, COATED ORAL NIGHTLY
Qty: 90 TABLET | Refills: 3 | Status: SHIPPED | OUTPATIENT
Start: 2022-03-17

## 2022-03-19 ENCOUNTER — OFFICE VISIT (OUTPATIENT)
Dept: URGENT CARE | Facility: CLINIC | Age: 52
End: 2022-03-19
Payer: COMMERCIAL

## 2022-03-19 VITALS
WEIGHT: 212 LBS | HEART RATE: 87 BPM | BODY MASS INDEX: 35.32 KG/M2 | SYSTOLIC BLOOD PRESSURE: 131 MMHG | OXYGEN SATURATION: 98 % | HEIGHT: 65 IN | TEMPERATURE: 98 F | RESPIRATION RATE: 16 BRPM | DIASTOLIC BLOOD PRESSURE: 87 MMHG

## 2022-03-19 DIAGNOSIS — Z11.52 ENCOUNTER FOR SCREENING FOR COVID-19: ICD-10-CM

## 2022-03-19 DIAGNOSIS — Z20.822 ENCOUNTER FOR LABORATORY TESTING FOR COVID-19 VIRUS: Primary | ICD-10-CM

## 2022-03-19 LAB
CTP QC/QA: YES
SARS-COV-2 AG RESP QL IA.RAPID: NEGATIVE

## 2022-03-19 PROCEDURE — 87811 SARS-COV-2 COVID19 W/OPTIC: CPT | Mod: S$GLB,,, | Performed by: NURSE PRACTITIONER

## 2022-03-19 PROCEDURE — 3075F SYST BP GE 130 - 139MM HG: CPT | Mod: CPTII,S$GLB,, | Performed by: NURSE PRACTITIONER

## 2022-03-19 PROCEDURE — 1160F PR REVIEW ALL MEDS BY PRESCRIBER/CLIN PHARMACIST DOCUMENTED: ICD-10-PCS | Mod: CPTII,S$GLB,, | Performed by: NURSE PRACTITIONER

## 2022-03-19 PROCEDURE — 99213 PR OFFICE/OUTPT VISIT, EST, LEVL III, 20-29 MIN: ICD-10-PCS | Mod: S$GLB,,, | Performed by: NURSE PRACTITIONER

## 2022-03-19 PROCEDURE — 4010F ACE/ARB THERAPY RXD/TAKEN: CPT | Mod: CPTII,S$GLB,, | Performed by: NURSE PRACTITIONER

## 2022-03-19 PROCEDURE — 1160F RVW MEDS BY RX/DR IN RCRD: CPT | Mod: CPTII,S$GLB,, | Performed by: NURSE PRACTITIONER

## 2022-03-19 PROCEDURE — 1159F PR MEDICATION LIST DOCUMENTED IN MEDICAL RECORD: ICD-10-PCS | Mod: CPTII,S$GLB,, | Performed by: NURSE PRACTITIONER

## 2022-03-19 PROCEDURE — 3075F PR MOST RECENT SYSTOLIC BLOOD PRESS GE 130-139MM HG: ICD-10-PCS | Mod: CPTII,S$GLB,, | Performed by: NURSE PRACTITIONER

## 2022-03-19 PROCEDURE — 87811 SARS CORONAVIRUS 2 ANTIGEN POCT, MANUAL READ: ICD-10-PCS | Mod: S$GLB,,, | Performed by: NURSE PRACTITIONER

## 2022-03-19 PROCEDURE — 3008F BODY MASS INDEX DOCD: CPT | Mod: CPTII,S$GLB,, | Performed by: NURSE PRACTITIONER

## 2022-03-19 PROCEDURE — 3079F DIAST BP 80-89 MM HG: CPT | Mod: CPTII,S$GLB,, | Performed by: NURSE PRACTITIONER

## 2022-03-19 PROCEDURE — 3079F PR MOST RECENT DIASTOLIC BLOOD PRESSURE 80-89 MM HG: ICD-10-PCS | Mod: CPTII,S$GLB,, | Performed by: NURSE PRACTITIONER

## 2022-03-19 PROCEDURE — 4010F PR ACE/ARB THEARPY RXD/TAKEN: ICD-10-PCS | Mod: CPTII,S$GLB,, | Performed by: NURSE PRACTITIONER

## 2022-03-19 PROCEDURE — 3008F PR BODY MASS INDEX (BMI) DOCUMENTED: ICD-10-PCS | Mod: CPTII,S$GLB,, | Performed by: NURSE PRACTITIONER

## 2022-03-19 PROCEDURE — 1159F MED LIST DOCD IN RCRD: CPT | Mod: CPTII,S$GLB,, | Performed by: NURSE PRACTITIONER

## 2022-03-19 PROCEDURE — 99213 OFFICE O/P EST LOW 20 MIN: CPT | Mod: S$GLB,,, | Performed by: NURSE PRACTITIONER

## 2022-03-19 RX ORDER — ERGOCALCIFEROL 1.25 MG/1
50000 CAPSULE ORAL
COMMUNITY
Start: 2022-03-02

## 2022-03-19 NOTE — PROGRESS NOTES
"Subjective:       Patient ID: Meg Lockhart is a 51 y.o. female.    Vitals:  height is 5' 5" (1.651 m) and weight is 96.2 kg (212 lb). Her temperature is 97.7 °F (36.5 °C). Her blood pressure is 131/87 and her pulse is 87. Her respiration is 16 and oxygen saturation is 98%.     Chief Complaint: COVID-19 Concerns (Rapid test for cruise. No symptoms or exposure. )    Patient is here for covid 19 screening prior to travel. No symptoms or exposure.    Past Medical History:  No date: Anxiety  No date: Diabetes mellitus  No date: Elevated cholesterol  No date: GERD (gastroesophageal reflux disease)  No date: Hypertension  No date: PUD (peptic ulcer disease)    Past Surgical History:  No date: GASTRIC BYPASS  No date: HYSTERECTOMY    Review of patient's family history indicates:  Problem: Heart disease      Relation: Mother          Age of Onset: (Not Specified)  Problem: Hypertension      Relation: Mother          Age of Onset: (Not Specified)  Problem: Heart attack      Relation: Mother          Age of Onset: (Not Specified)  Problem: Stroke      Relation: Brother          Age of Onset: (Not Specified)  Problem: Cancer      Relation: Maternal Aunt          Age of Onset: (Not Specified)  Problem: Cancer      Relation: Maternal Grandmother          Age of Onset: (Not Specified)          Comment: lung  Problem: Heart disease      Relation: Maternal Grandmother          Age of Onset: (Not Specified)  Problem: Diabetes      Relation: Paternal Grandmother          Age of Onset: (Not Specified)  Problem: Heart disease      Relation: Paternal Grandmother          Age of Onset: (Not Specified)  Problem: Diabetes      Relation: Paternal Grandfather          Age of Onset: (Not Specified)      Social History    Socioeconomic History      Marital status:     Tobacco Use      Smoking status: Never Smoker      Smokeless tobacco: Never Used    Substance and Sexual Activity      Alcohol use: No      Drug use: No      Sexual " activity: Yes        Partners: Male      Current Outpatient Medications:  amLODIPine (NORVASC) 10 MG tablet, Take 0.5 tablets (5 mg total) by mouth once daily., Disp: 90 tablet, Rfl: 3  cetirizine (ZYRTEC) 10 MG tablet, Take 1 tablet (10 mg total) by mouth once daily. for 10 days, Disp: 10 tablet, Rfl: 0  chlorthalidone (HYGROTEN) 25 MG Tab, Take 1 tablet (25 mg total) by mouth once daily., Disp: 30 tablet, Rfl: 11  clotrimazole (LOTRIMIN) 1 % cream, Apply topically., Disp: , Rfl:   ergocalciferol (ERGOCALCIFEROL) 50,000 unit Cap, Take 50,000 Units by mouth every 7 days., Disp: , Rfl:   famotidine (PEPCID) 40 MG tablet, Take 1 tablet (40 mg total) by mouth once daily., Disp: 30 tablet, Rfl: 11  FREESTYLE LANCETS lancets, 6 strips. , Disp: , Rfl:   hyoscyamine (ANASPAZ,LEVSIN) 0.125 mg Tab, Take 1 tablet (125 mcg total) by mouth every 6 (six) hours as needed., Disp: 20 tablet, Rfl: 0  ketoconazole (NIZORAL) 2 % cream, Apply topically 2 (two) times daily., Disp: , Rfl:   LORazepam (ATIVAN) 1 MG tablet, Take 1 mg by mouth as needed for Anxiety., Disp: , Rfl:   losartan (COZAAR) 50 MG tablet, Take 50 mg by mouth every morning., Disp: , Rfl: 1  metFORMIN (GLUCOPHAGE) 500 MG tablet, Take 500 mg by mouth., Disp: , Rfl:   NURTEC 75 mg odt, Take by mouth., Disp: , Rfl:   ondansetron (ZOFRAN) 8 MG tablet, Take 1 tablet (8 mg total) by mouth every 8 (eight) hours as needed for Nausea., Disp: 30 tablet, Rfl: 0  pantoprazole (PROTONIX) 40 MG tablet, Take 1 tablet (40 mg total) by mouth once daily., Disp: 90 tablet, Rfl: 1  rosuvastatin (CRESTOR) 20 MG tablet, Take 1 tablet (20 mg total) by mouth every evening., Disp: 90 tablet, Rfl: 3  semaglutide, weight loss, (WEGOVY) 0.25 mg/0.5 mL PnIj, Inject 0.25 mg into the skin every 7 days., Disp: 2 mL, Rfl: 0  (START ON 3/28/2022) semaglutide, weight loss, (WEGOVY) 0.5 mg/0.5 mL PnIj, Inject 0.5 mg into the skin every 7 days., Disp: 2 mL, Rfl: 0  (START ON 4/27/2022) semaglutide,  weight loss, (WEGOVY) 1 mg/0.5 mL PnIj, Inject 1 mg into the skin every 7 days., Disp: 2 mL, Rfl: 0  (START ON 2022) semaglutide, weight loss, (WEGOVY) 1.7 mg/0.75 mL PnIj, Inject 1.7 mg into the skin every 7 days., Disp: 3 mL, Rfl: 0  (START ON 2022) semaglutide, weight loss, (WEGOVY) 2.4 mg/0.75 mL PnIj, Inject 2.4 mg into the skin every 7 days., Disp: 3 mL, Rfl: 0  TRESIBA FLEXTOUCH U-100 100 unit/mL (3 mL) insulin pen, SMARTSI Unit(s) SUB-Q Every Morning, Disp: , Rfl:     No current facility-administered medications for this visit.      Review of patient's allergies indicates:   -- Penicillins -- Itching and Anaphylaxis   -- Sulfa (sulfonamide antibiotics) -- Anaphylaxis   -- Meperidine -- Itching      Constitution: Negative. Negative for chills, sweating, fatigue, fever and unexpected weight change.   HENT: Negative.    Neck: neck negative.   Cardiovascular: Negative.    Respiratory: Negative.    Gastrointestinal: Negative.    Endocrine: negative.   Musculoskeletal: Negative.    Neurological: Negative.    Psychiatric/Behavioral: Negative.        Objective:      Physical Exam   Constitutional: No distress.   HENT:   Head: Normocephalic and atraumatic.   Cardiovascular: Normal rate.   Pulmonary/Chest: Effort normal. No respiratory distress.   Abdominal: Normal appearance.   Neurological: She is alert.   Psychiatric: Her behavior is normal. Mood normal.         Assessment:       1. Encounter for laboratory testing for COVID-19 virus    2. Encounter for screening for COVID-19          Plan:       Rapid Covid 19 test collected and resulted negative. Results discussed with patient, advised to follow up for any further concerns.       Encounter for laboratory testing for COVID-19 virus    Encounter for screening for COVID-19  -     SARS Coronavirus 2 Antigen, POCT Manual Read

## 2022-04-11 ENCOUNTER — TELEPHONE (OUTPATIENT)
Dept: CARDIOLOGY | Facility: CLINIC | Age: 52
End: 2022-04-11
Payer: COMMERCIAL

## 2022-04-11 NOTE — TELEPHONE ENCOUNTER
Pt updated on BMP ordered w. [facility] on 3/9 by dr Jason + need to f/u w. provider in 4 weeks. Pt updated on will need to f/u with different provider as no appt available w. dr Jason on an immediate basis. Pt stated will do the lab work and will schedule her appt after.

## 2022-05-04 ENCOUNTER — HOSPITAL ENCOUNTER (EMERGENCY)
Facility: HOSPITAL | Age: 52
Discharge: HOME | End: 2022-05-04
Attending: EMERGENCY MEDICINE
Payer: COMMERCIAL

## 2022-05-04 VITALS
HEIGHT: 65 IN | RESPIRATION RATE: 18 BRPM | TEMPERATURE: 98 F | WEIGHT: 210 LBS | HEART RATE: 88 BPM | BODY MASS INDEX: 34.99 KG/M2

## 2022-05-04 DIAGNOSIS — S39.012A LOW BACK STRAIN, INITIAL ENCOUNTER: ICD-10-CM

## 2022-05-04 DIAGNOSIS — M54.16 LUMBAR RADICULOPATHY, ACUTE: Primary | ICD-10-CM

## 2022-05-04 PROCEDURE — 63600175 PHARM REV CODE 636 W HCPCS: Performed by: PHYSICIAN ASSISTANT

## 2022-05-04 PROCEDURE — 96372 THER/PROPH/DIAG INJ SC/IM: CPT | Performed by: PHYSICIAN ASSISTANT

## 2022-05-04 PROCEDURE — 25000003 PHARM REV CODE 250: Performed by: PHYSICIAN ASSISTANT

## 2022-05-04 PROCEDURE — 99284 EMERGENCY DEPT VISIT MOD MDM: CPT | Mod: 25

## 2022-05-04 RX ORDER — HYDROCODONE BITARTRATE AND ACETAMINOPHEN 5; 325 MG/1; MG/1
1 TABLET ORAL EVERY 6 HOURS PRN
Qty: 12 TABLET | Refills: 0 | Status: SHIPPED | OUTPATIENT
Start: 2022-05-04 | End: 2022-05-07

## 2022-05-04 RX ORDER — KETOROLAC TROMETHAMINE 30 MG/ML
15 INJECTION, SOLUTION INTRAMUSCULAR; INTRAVENOUS
Status: COMPLETED | OUTPATIENT
Start: 2022-05-04 | End: 2022-05-04

## 2022-05-04 RX ORDER — CYCLOBENZAPRINE HCL 10 MG
10 TABLET ORAL 3 TIMES DAILY PRN
Qty: 15 TABLET | Refills: 0 | Status: SHIPPED | OUTPATIENT
Start: 2022-05-04 | End: 2022-05-09

## 2022-05-04 RX ORDER — METHOCARBAMOL 500 MG/1
1000 TABLET, FILM COATED ORAL
Status: COMPLETED | OUTPATIENT
Start: 2022-05-04 | End: 2022-05-04

## 2022-05-04 RX ADMIN — KETOROLAC TROMETHAMINE 15 MG: 30 INJECTION, SOLUTION INTRAMUSCULAR at 06:05

## 2022-05-04 RX ADMIN — METHOCARBAMOL 1000 MG: 500 TABLET ORAL at 06:05

## 2022-05-04 NOTE — Clinical Note
"Meg"Neris Lockhart was seen and treated in our emergency department on 5/4/2022.  She may return to work on 05/09/2022.       If you have any questions or concerns, please don't hesitate to call.      Ron Bynum MD"

## 2022-05-04 NOTE — ED PROVIDER NOTES
"Encounter Date: 5/4/2022    SCRIBE #1 NOTE: I, Chelsi Winters, am scribing for, and in the presence of, Ron Bynum MD.       History     Chief Complaint   Patient presents with    Back Pain     Left lower back pain radiating down left leg x 5 days     Time seen by provider: 6:25 PM on 05/04/2022    Meg Lockhart is a 52 y.o. female who presents to the ED with an onset of worsening left lower "aching" back pain that radiates to the RLQ of the abdomen (stabbing) since 3 days ago.  Patient reports a similar Hx in April that was improved with rest and immobility.  She states difficulty lifting her LLE, walking and sitting.  No alleviating factors mentioned with current episode.  Pain is exacerbated with movement.  The patient denies urinary or bowel incontinence, numbness or any other symptoms at this time.  Patient has a PMHx of DM, HTN, anxiety, HLD, and PUD.  PSHx includes hysterectomy and gastric bypass.  No Hx of arthritis.      The history is provided by the patient.     Review of patient's allergies indicates:   Allergen Reactions    Penicillins Itching and Anaphylaxis    Sulfa (sulfonamide antibiotics) Anaphylaxis    Meperidine Itching     Past Medical History:   Diagnosis Date    Anxiety     Diabetes mellitus     Elevated cholesterol     GERD (gastroesophageal reflux disease)     Hypertension     PUD (peptic ulcer disease)      Past Surgical History:   Procedure Laterality Date    GASTRIC BYPASS      HYSTERECTOMY       Family History   Problem Relation Age of Onset    Heart disease Mother     Hypertension Mother     Heart attack Mother     Stroke Brother     Cancer Maternal Aunt     Cancer Maternal Grandmother         lung    Heart disease Maternal Grandmother     Diabetes Paternal Grandmother     Heart disease Paternal Grandmother     Diabetes Paternal Grandfather      Social History     Tobacco Use    Smoking status: Never Smoker    Smokeless tobacco: Never Used   Substance " Use Topics    Alcohol use: No    Drug use: No     Review of Systems   Constitutional: Positive for activity change. Negative for fever.   HENT: Negative for sore throat.    Respiratory: Negative for shortness of breath.    Cardiovascular: Negative for chest pain.   Gastrointestinal: Positive for abdominal pain. Negative for nausea.   Genitourinary: Negative for dysuria.        Negative for urinary or bowel incontinence.    Musculoskeletal: Positive for back pain.   Skin: Negative for rash.   Neurological: Negative for weakness and numbness.   Hematological: Does not bruise/bleed easily.       Physical Exam     Initial Vitals [05/04/22 1712]   BP Pulse Resp Temp SpO2   -- 88 18 98.1 °F (36.7 °C) --      MAP       --         Physical Exam    Nursing note and vitals reviewed.  Constitutional: She appears well-developed.  Non-toxic appearance.   HENT:   Head: Normocephalic and atraumatic.   Eyes: EOM are normal. Pupils are equal, round, and reactive to light.   Neck: Neck supple.   Cardiovascular: Normal rate, regular rhythm, normal heart sounds and intact distal pulses. Exam reveals no gallop and no friction rub.    No murmur heard.  Pulses:       Femoral pulses are 2+ on the left side.       Dorsalis pedis pulses are 2+ on the left side.        Posterior tibial pulses are 2+ on the left side.   Pulmonary/Chest: Breath sounds normal. No respiratory distress. She has no decreased breath sounds. She has no wheezes. She has no rhonchi. She has no rales.   Abdominal: Abdomen is soft. Bowel sounds are normal. She exhibits no distension. There is no abdominal tenderness. No hernia.   Musculoskeletal:      Cervical back: Neck supple.      Lumbar back: Tenderness present. Positive left straight leg raise test.      Comments: Tenderness to palpation of the mid lumbar region.       Neurological: She is alert and oriented to person, place, and time. She has normal strength.   5/5 strength on dorsiflexion and plantar flexion of  the LLE.     Skin: Skin is warm and dry. No rash noted.   Psychiatric: She has a normal mood and affect.         ED Course   Procedures  Labs Reviewed - No data to display       Imaging Results    None          Medications   ketorolac injection 15 mg (15 mg Intramuscular Given 5/4/22 1841)   methocarbamoL tablet 1,000 mg (1,000 mg Oral Given 5/4/22 1841)     Medical Decision Making:   History:   Old Medical Records: I decided to obtain old medical records.          Scribe Attestation:   Scribe #1: I performed the above scribed service and the documentation accurately describes the services I performed. I attest to the accuracy of the note.        ED Course as of 05/06/22 2125   Wed May 04, 2022   1836 Patient appears have lumbar radiculopathy.  I do not believe she has a vascular deficit.  No signs of DVT or ischemic limb on exam.  5/5 strength and sensation.  She was given Toradol and Norflex here would be prescribed a short course of pain [JS]   1837  medicine muscle axis.  Follow up primary care. [JS]      ED Course User Index  [JS] Ron Bynum MD           I, Dr. Ron Bynum personally performed the services described in this documentation. All medical record entries made by the scribe were at my direction and in my presence.  I have reviewed the chart and agree that the record reflects my personal performance and is accurate and complete. Ron Bynum MD.  9:25 PM 05/06/2022    DISCLAIMER: This note was prepared with Dragon NaturallySpeaking voice recognition transcription software. Garbled syntax, mangled pronouns, and other bizarre constructions may be attributed to that software system     Clinical Impression:   Final diagnoses:  [M54.16] Lumbar radiculopathy, acute (Primary)  [S39.012A] Low back strain, initial encounter          ED Disposition Condition    Discharge Stable        ED Prescriptions     Medication Sig Dispense Start Date End Date Auth. Provider    HYDROcodone-acetaminophen  (NORCO) 5-325 mg per tablet Take 1 tablet by mouth every 6 (six) hours as needed (take only if you are having severe pain and if ibuprofen isn't working.). 12 tablet 5/4/2022 5/7/2022 Ron Bynum MD    cyclobenzaprine (FLEXERIL) 10 MG tablet Take 1 tablet (10 mg total) by mouth 3 (three) times daily as needed. 15 tablet 5/4/2022 5/9/2022 Ron Bynum MD        Follow-up Information     Follow up With Specialties Details Why Contact Info    Liban Roger MD Family Medicine Schedule an appointment as soon as possible for a visit   [address] [facility] [id number] [phone]             Ron Bynum MD  05/06/22 6754

## 2022-05-04 NOTE — FIRST PROVIDER EVALUATION
Emergency Department TeleTriage Encounter Note      CHIEF COMPLAINT    Chief Complaint   Patient presents with    Back Pain     Left lower back pain radiating down left leg x 5 days       VITAL SIGNS   Initial Vitals [05/04/22 1712]   BP Pulse Resp Temp SpO2   -- 88 18 98.1 °F (36.7 °C) --      MAP       --            ALLERGIES    Review of patient's allergies indicates:   Allergen Reactions    Penicillins Itching and Anaphylaxis    Sulfa (sulfonamide antibiotics) Anaphylaxis    Meperidine Itching       PROVIDER TRIAGE NOTE  This is a teletriage evaluation of a 52 y.o. female presenting to the ED complaining of L sided groin/buttocks/LBP with radiation down her buttocks to the level of the mid thigh.  NSAIDs not helping.  No bowel/bladder incontinence.     Initial orders will be placed and care will be transferred to an alternate provider when patient is roomed for a full evaluation. Any additional orders and the final disposition will be determined by that provider.           ORDERS  Labs Reviewed - No data to display    ED Orders (720h ago, onward)    Start Ordered     Status Ordering Provider    05/04/22 1745 05/04/22 1741  ketorolac injection 15 mg  ED 1 Time         Ordered ESTELLE TIPTON    05/04/22 1745 05/04/22 1741  methocarbamoL tablet 1,000 mg  ED 1 Time         Ordered ESTELLE TIPTON            Virtual Visit Note: The provider triage portion of this emergency department evaluation and documentation was performed via [email or web address], a HIPAA-compliant telemedicine application, in concert with a tele-presenter in the room. A face to face patient evaluation with one of my colleagues will occur once the patient is placed in an emergency department room.      DISCLAIMER: This note was prepared with [employer] voice recognition transcription software. Garbled syntax, mangled pronouns, and other bizarre constructions may be attributed to that software system.

## 2022-05-05 NOTE — ED NOTES
D/C instructions and Rx in pt possession along with belongings. No other needs at this time. Pt AAOx4, Abc's intact. NADN. No adverse reaction to medication given. Pt assisted to POV via w/c.

## 2022-06-14 ENCOUNTER — OFFICE VISIT (OUTPATIENT)
Dept: URGENT CARE | Facility: CLINIC | Age: 52
End: 2022-06-14
Payer: COMMERCIAL

## 2022-06-14 VITALS
HEIGHT: 65 IN | DIASTOLIC BLOOD PRESSURE: 101 MMHG | BODY MASS INDEX: 35.49 KG/M2 | SYSTOLIC BLOOD PRESSURE: 159 MMHG | RESPIRATION RATE: 18 BRPM | TEMPERATURE: 100 F | OXYGEN SATURATION: 98 % | WEIGHT: 213 LBS | HEART RATE: 98 BPM

## 2022-06-14 DIAGNOSIS — U07.1 COVID-19 VIRUS DETECTED: ICD-10-CM

## 2022-06-14 DIAGNOSIS — R89.4 INFLUENZA A VIRUS NOT DETECTED: ICD-10-CM

## 2022-06-14 DIAGNOSIS — R52 BODY ACHES: Primary | ICD-10-CM

## 2022-06-14 DIAGNOSIS — Z86.79 HISTORY OF HYPERTENSION: ICD-10-CM

## 2022-06-14 DIAGNOSIS — R50.9 FEVER, UNSPECIFIED FEVER CAUSE: ICD-10-CM

## 2022-06-14 DIAGNOSIS — Z86.39 HISTORY OF TYPE 2 DIABETES MELLITUS: ICD-10-CM

## 2022-06-14 LAB
CTP QC/QA: YES
CTP QC/QA: YES
FLUAV AG NPH QL: NEGATIVE
FLUBV AG NPH QL: NEGATIVE
SARS-COV-2 AG RESP QL IA.RAPID: POSITIVE

## 2022-06-14 PROCEDURE — 87804 POCT INFLUENZA A/B: ICD-10-PCS | Mod: 59,QW,, | Performed by: NURSE PRACTITIONER

## 2022-06-14 PROCEDURE — 1160F RVW MEDS BY RX/DR IN RCRD: CPT | Mod: CPTII,S$GLB,, | Performed by: NURSE PRACTITIONER

## 2022-06-14 PROCEDURE — 3008F PR BODY MASS INDEX (BMI) DOCUMENTED: ICD-10-PCS | Mod: CPTII,S$GLB,, | Performed by: NURSE PRACTITIONER

## 2022-06-14 PROCEDURE — 3077F PR MOST RECENT SYSTOLIC BLOOD PRESSURE >= 140 MM HG: ICD-10-PCS | Mod: CPTII,S$GLB,, | Performed by: NURSE PRACTITIONER

## 2022-06-14 PROCEDURE — 3080F DIAST BP >= 90 MM HG: CPT | Mod: CPTII,S$GLB,, | Performed by: NURSE PRACTITIONER

## 2022-06-14 PROCEDURE — 1160F PR REVIEW ALL MEDS BY PRESCRIBER/CLIN PHARMACIST DOCUMENTED: ICD-10-PCS | Mod: CPTII,S$GLB,, | Performed by: NURSE PRACTITIONER

## 2022-06-14 PROCEDURE — 99214 OFFICE O/P EST MOD 30 MIN: CPT | Mod: S$GLB,,, | Performed by: NURSE PRACTITIONER

## 2022-06-14 PROCEDURE — 4010F PR ACE/ARB THEARPY RXD/TAKEN: ICD-10-PCS | Mod: CPTII,S$GLB,, | Performed by: NURSE PRACTITIONER

## 2022-06-14 PROCEDURE — 3077F SYST BP >= 140 MM HG: CPT | Mod: CPTII,S$GLB,, | Performed by: NURSE PRACTITIONER

## 2022-06-14 PROCEDURE — 3008F BODY MASS INDEX DOCD: CPT | Mod: CPTII,S$GLB,, | Performed by: NURSE PRACTITIONER

## 2022-06-14 PROCEDURE — 87804 INFLUENZA ASSAY W/OPTIC: CPT | Mod: QW,,, | Performed by: NURSE PRACTITIONER

## 2022-06-14 PROCEDURE — 1159F MED LIST DOCD IN RCRD: CPT | Mod: CPTII,S$GLB,, | Performed by: NURSE PRACTITIONER

## 2022-06-14 PROCEDURE — 87811 SARS CORONAVIRUS 2 ANTIGEN POCT, MANUAL READ: ICD-10-PCS | Mod: QW,S$GLB,, | Performed by: NURSE PRACTITIONER

## 2022-06-14 PROCEDURE — 1159F PR MEDICATION LIST DOCUMENTED IN MEDICAL RECORD: ICD-10-PCS | Mod: CPTII,S$GLB,, | Performed by: NURSE PRACTITIONER

## 2022-06-14 PROCEDURE — 4010F ACE/ARB THERAPY RXD/TAKEN: CPT | Mod: CPTII,S$GLB,, | Performed by: NURSE PRACTITIONER

## 2022-06-14 PROCEDURE — 3080F PR MOST RECENT DIASTOLIC BLOOD PRESSURE >= 90 MM HG: ICD-10-PCS | Mod: CPTII,S$GLB,, | Performed by: NURSE PRACTITIONER

## 2022-06-14 PROCEDURE — 99214 PR OFFICE/OUTPT VISIT, EST, LEVL IV, 30-39 MIN: ICD-10-PCS | Mod: S$GLB,,, | Performed by: NURSE PRACTITIONER

## 2022-06-14 PROCEDURE — 87811 SARS-COV-2 COVID19 W/OPTIC: CPT | Mod: QW,S$GLB,, | Performed by: NURSE PRACTITIONER

## 2022-06-14 RX ORDER — ONDANSETRON 4 MG/1
4 TABLET, ORALLY DISINTEGRATING ORAL EVERY 8 HOURS PRN
Qty: 20 TABLET | Refills: 0 | Status: SHIPPED | OUTPATIENT
Start: 2022-06-14

## 2022-06-14 RX ORDER — CETIRIZINE HYDROCHLORIDE 10 MG/1
10 TABLET ORAL DAILY
Qty: 30 TABLET | Refills: 0 | Status: SHIPPED | OUTPATIENT
Start: 2022-06-14 | End: 2022-07-14

## 2022-06-14 RX ORDER — PROMETHAZINE HYDROCHLORIDE AND DEXTROMETHORPHAN HYDROBROMIDE 6.25; 15 MG/5ML; MG/5ML
5 SYRUP ORAL EVERY 4 HOURS PRN
Qty: 118 ML | Refills: 0 | Status: SHIPPED | OUTPATIENT
Start: 2022-06-14 | End: 2022-06-24

## 2022-06-14 RX ORDER — BENZONATATE 100 MG/1
100 CAPSULE ORAL 3 TIMES DAILY PRN
Qty: 30 CAPSULE | Refills: 0 | Status: SHIPPED | OUTPATIENT
Start: 2022-06-14 | End: 2022-06-24

## 2022-06-14 RX ORDER — ALBUTEROL SULFATE 90 UG/1
2 AEROSOL, METERED RESPIRATORY (INHALATION) EVERY 6 HOURS PRN
Qty: 18 G | Refills: 0 | Status: SHIPPED | OUTPATIENT
Start: 2022-06-14

## 2022-06-14 RX ORDER — FLUTICASONE PROPIONATE 50 MCG
1 SPRAY, SUSPENSION (ML) NASAL DAILY
Qty: 15.8 ML | Refills: 0 | Status: SHIPPED | OUTPATIENT
Start: 2022-06-14

## 2022-06-14 NOTE — PROGRESS NOTES
"Subjective:       Patient ID: Meg Lockhart is a 52 y.o. female.    Vitals:  height is 5' 5" (1.651 m) and weight is 96.6 kg (213 lb). Her oral temperature is 100.2 °F (37.9 °C). Her blood pressure is 159/101 (abnormal) and her pulse is 98. Her respiration is 18 and oxygen saturation is 98%.     Chief Complaint: Headache    Pt have a headache and a dry cough yesterday. Pt body ache also. Pt took mucinex last night around 9. When pt cough it makes everything hurts.     Onset of symptoms yesterday morning, vaccinated    Headache   This is a new problem. The current episode started yesterday. The problem occurs constantly. The pain radiates to the lower back and upper back. The pain is at a severity of 7/10. The pain is moderate. Associated symptoms include back pain, coughing, a fever and a sore throat. Nothing aggravates the symptoms. The treatment provided no relief.       Constitution: Positive for chills, fatigue and fever.   HENT: Positive for congestion and sore throat. Negative for trouble swallowing and voice change.    Cardiovascular: Negative for chest pain.   Respiratory: Positive for chest tightness, cough and shortness of breath.    Musculoskeletal: Positive for back pain.   Neurological: Positive for headaches.       Objective:      Physical Exam   Constitutional: She is oriented to person, place, and time. She appears well-developed.  Non-toxic appearance. She appears ill. No distress. obesity  HENT:   Head: Normocephalic and atraumatic.   Ears:   Right Ear: Tympanic membrane normal.   Left Ear: Tympanic membrane normal.   Nose: Nose normal.   Mouth/Throat: Oropharynx is clear and moist. Mucous membranes are moist. No oropharyngeal exudate.   Eyes: Conjunctivae and EOM are normal. Pupils are equal, round, and reactive to light. Right eye exhibits no discharge. Left eye exhibits no discharge.   Neck: Neck supple. No neck rigidity present.   Cardiovascular: Normal rate, regular rhythm and normal " heart sounds.   Pulmonary/Chest: Effort normal and breath sounds normal. No respiratory distress.   Abdominal: Normal appearance.   Musculoskeletal:      Cervical back: She exhibits no tenderness.   Lymphadenopathy:     She has no cervical adenopathy.   Neurological: no focal deficit. She is alert and oriented to person, place, and time.   Skin: Skin is warm, dry and not diaphoretic. Capillary refill takes 2 to 3 seconds.   Psychiatric: Her behavior is normal. Mood normal.   Nursing note and vitals reviewed.        Assessment:       1. Body aches    2. History of hypertension    3. History of type 2 diabetes mellitus    4. COVID-19 virus detected    5. Influenza A virus not detected    6. Fever, unspecified fever cause        3 covid risk score    Plan:         Body aches  -     SARS Coronavirus 2 Antigen, POCT Manual Read  -     POCT Influenza A/B    History of hypertension    History of type 2 diabetes mellitus    COVID-19 virus detected  -     albuterol (VENTOLIN HFA) 90 mcg/actuation inhaler; Inhale 2 puffs into the lungs every 6 (six) hours as needed for Wheezing. Rescue  Dispense: 18 g; Refill: 0  -     fluticasone propionate (FLONASE) 50 mcg/actuation nasal spray; 1 spray (50 mcg total) by Each Nostril route once daily.  Dispense: 15.8 mL; Refill: 0  -     cetirizine (ZYRTEC) 10 MG tablet; Take 1 tablet (10 mg total) by mouth once daily.  Dispense: 30 tablet; Refill: 0  -     benzonatate (TESSALON) 100 MG capsule; Take 1 capsule (100 mg total) by mouth 3 (three) times daily as needed for Cough.  Dispense: 30 capsule; Refill: 0  -     promethazine-dextromethorphan (PROMETHAZINE-DM) 6.25-15 mg/5 mL Syrp; Take 5 mLs by mouth every 4 (four) hours as needed (cough).  Dispense: 118 mL; Refill: 0  -     ondansetron (ZOFRAN-ODT) 4 MG TbDL; Take 1 tablet (4 mg total) by mouth every 8 (eight) hours as needed (nausea).  Dispense: 20 tablet; Refill: 0    Influenza A virus not detected    Fever, unspecified fever  cause    Symptomatic treatment to include:    Rest, increase fluid intake to include electrolyte replacement  Ibuprofen/Tylenol as directed for fever, sore throat, body aches  Zrytec and flonase for sinus symptoms  Phenergan cough syrup at night for cough  Tessalon perles cough pills as needed day or night  Mucinex D over the counter as directed for sinus congestion.  Coricidin HBP if you have high blood pressure.  Zofran as directed for nausea/vomiting.  Warm, salt water gargles, over the counter throat lozenges or sprays as desires.   Imodium over the counter as directed for diarrhea.  ER for difficulty breathing not relieved by rest, excessive lethargy and/or change in mental status  Albuterol inhaler (if prescribed) for chest tightness, shortness or breath, wheezing, or tight/wheezing cough especially when brought on with deep breath.  Follow CDC isolation guidelines as provided      Patient Instructions   POSITIVE COVID TEST      You have tested positive for COVID-19 today.  Please note that patients who test positive for COVID-19 are required by the CDC to undergo isolation for 5 days, then wearing a mask around others for an additional 5 days, after their symptoms first began following the new updated guidelines of 12/27/2021. This isolation starts from the day you first developed symptoms, not the day of your positive test. For example, if your symptoms began on a Monday but tested positive on the following Wednesday, your 5-day isolation begins from that Monday, not the Wednesday you tested positive.  However, if you are asymptomatic (a person who does not have any symptoms) and COVID-19 positive, your 5-day isolation begins on the day you tested positive, regardless of exposure date.  Also, per the CDC guidelines, once your 5 days have passed, symptoms have resolved or are improving, and you have not had fever greater than 100.4F in the last 24 hours without taking any fever reducers such as Tylenol  (Acetaminophen) or Motrin (Ibuprofen), you may return to your normal activities including social distancing, wearing masks, and frequent handwashing - YOU DO NOT NEED ANOTHER TEST IN ORDER TO END YOUR QUARANTINE.

## 2022-06-14 NOTE — LETTER
June 19, 2022      [facility]  [address]  [facility] [address]  Phone: [phone]       Patient: Meg Lockhart   YOB: 1970  Date of Visit: 06/19/2022    To Whom It May Concern:    Lisa Lockhart  was at [facility] on 06/19/2022. The patient may return to work/school on 06/23/2022  with no restrictions. If you have any questions or concerns, or if I can be of further assistance, please do not hesitate to contact me.    Sincerely,    Elisabeth Reynoso, NP

## 2022-06-14 NOTE — LETTER
June 14, 2022      [facility]  [address]  [facility] [address]  Phone: [phone]       Patient: Meg Lockhart   YOB: 1970  Date of Visit: 06/14/2022    To Whom It May Concern:    Lisa Lockhart  was at [facility] on 06/14/2022. The patient may return to work/school on 06/19/2022 as long as no fever for 24 hours and symptoms are improving with that additional 5 days of wearing a mask while around others. If you have any questions or concerns, or if I can be of further assistance, please do not hesitate to contact me.    Sincerely,    Elisabeth Reynoso, NP

## 2022-06-18 ENCOUNTER — NURSE TRIAGE (OUTPATIENT)
Dept: ADMINISTRATIVE | Facility: CLINIC | Age: 52
End: 2022-06-18
Payer: COMMERCIAL

## 2022-06-18 NOTE — TELEPHONE ENCOUNTER
[facility] pt called with questions regarding going back to work.  Pt states she feels SOB, and weak.     Pt was advised to go to ED now per triage protocol.  Pt verbalized understanding.      Reason for Disposition   [1] MODERATE difficulty breathing (e.g., speaks in phrases, SOB even at rest, pulse 100-120) AND [2] NEW-onset or WORSE than normal    Additional Information   Negative: SEVERE difficulty breathing (e.g., struggling for each breath, speaks in single words)   Negative: [1] Breathing stopped AND [2] hasn't returned   Negative: Choking on something   Negative: Bluish (or gray) lips or face now   Negative: Difficult to awaken or acting confused (e.g., disoriented, slurred speech)   Negative: Passed out (i.e., lost consciousness, collapsed and was not responding)   Negative: Wheezing started suddenly after medicine, an allergic food or bee sting   Negative: Stridor   Negative: Slow, shallow and weak breathing   Negative: Sounds like a life-threatening emergency to the triager    Protocols used: BREATHING DIFFICULTY-A-AH

## 2022-08-03 ENCOUNTER — PATIENT MESSAGE (OUTPATIENT)
Dept: BARIATRICS | Facility: CLINIC | Age: 52
End: 2022-08-03
Payer: COMMERCIAL

## 2022-08-08 ENCOUNTER — OFFICE VISIT (OUTPATIENT)
Dept: URGENT CARE | Facility: CLINIC | Age: 52
End: 2022-08-08
Payer: COMMERCIAL

## 2022-08-08 VITALS
TEMPERATURE: 98 F | HEART RATE: 81 BPM | RESPIRATION RATE: 16 BRPM | HEIGHT: 65 IN | DIASTOLIC BLOOD PRESSURE: 102 MMHG | WEIGHT: 216 LBS | OXYGEN SATURATION: 98 % | SYSTOLIC BLOOD PRESSURE: 161 MMHG | BODY MASS INDEX: 35.99 KG/M2

## 2022-08-08 DIAGNOSIS — N76.2 ACUTE VULVITIS: ICD-10-CM

## 2022-08-08 DIAGNOSIS — R73.9 HYPERGLYCEMIA: ICD-10-CM

## 2022-08-08 DIAGNOSIS — R31.29 MICROSCOPIC HEMATURIA: ICD-10-CM

## 2022-08-08 DIAGNOSIS — R30.0 BURNING WITH URINATION: ICD-10-CM

## 2022-08-08 DIAGNOSIS — R32 URINARY INCONTINENCE IN FEMALE: Primary | ICD-10-CM

## 2022-08-08 DIAGNOSIS — N89.8 VAGINAL ITCHING: ICD-10-CM

## 2022-08-08 DIAGNOSIS — R81 GLUCOSURIA: ICD-10-CM

## 2022-08-08 DIAGNOSIS — R03.0 ELEVATED BLOOD PRESSURE READING: ICD-10-CM

## 2022-08-08 DIAGNOSIS — Z86.79 HISTORY OF HYPERTENSION: ICD-10-CM

## 2022-08-08 DIAGNOSIS — Z86.39 HISTORY OF TYPE 2 DIABETES MELLITUS: ICD-10-CM

## 2022-08-08 LAB
BILIRUB UR QL STRIP: NEGATIVE
GLUCOSE SERPL-MCNC: 306 MG/DL (ref 70–110)
GLUCOSE UR QL STRIP: POSITIVE
KETONES UR QL STRIP: NEGATIVE
LEUKOCYTE ESTERASE UR QL STRIP: NEGATIVE
PH, POC UA: 5
POC BLOOD, URINE: POSITIVE
POC NITRATES, URINE: NEGATIVE
PROT UR QL STRIP: POSITIVE
SP GR UR STRIP: 1.02 (ref 1–1.03)
UROBILINOGEN UR STRIP-ACNC: NORMAL (ref 0.1–1.1)

## 2022-08-08 PROCEDURE — 4010F ACE/ARB THERAPY RXD/TAKEN: CPT | Mod: CPTII,S$GLB,, | Performed by: NURSE PRACTITIONER

## 2022-08-08 PROCEDURE — 3008F BODY MASS INDEX DOCD: CPT | Mod: CPTII,S$GLB,, | Performed by: NURSE PRACTITIONER

## 2022-08-08 PROCEDURE — 3080F PR MOST RECENT DIASTOLIC BLOOD PRESSURE >= 90 MM HG: ICD-10-PCS | Mod: CPTII,S$GLB,, | Performed by: NURSE PRACTITIONER

## 2022-08-08 PROCEDURE — 4010F PR ACE/ARB THEARPY RXD/TAKEN: ICD-10-PCS | Mod: CPTII,S$GLB,, | Performed by: NURSE PRACTITIONER

## 2022-08-08 PROCEDURE — 3077F SYST BP >= 140 MM HG: CPT | Mod: CPTII,S$GLB,, | Performed by: NURSE PRACTITIONER

## 2022-08-08 PROCEDURE — 1160F RVW MEDS BY RX/DR IN RCRD: CPT | Mod: CPTII,S$GLB,, | Performed by: NURSE PRACTITIONER

## 2022-08-08 PROCEDURE — 82962 GLUCOSE BLOOD TEST: CPT | Mod: ,,, | Performed by: NURSE PRACTITIONER

## 2022-08-08 PROCEDURE — 3008F PR BODY MASS INDEX (BMI) DOCUMENTED: ICD-10-PCS | Mod: CPTII,S$GLB,, | Performed by: NURSE PRACTITIONER

## 2022-08-08 PROCEDURE — 3080F DIAST BP >= 90 MM HG: CPT | Mod: CPTII,S$GLB,, | Performed by: NURSE PRACTITIONER

## 2022-08-08 PROCEDURE — 81003 URINALYSIS AUTO W/O SCOPE: CPT | Mod: QW,S$GLB,, | Performed by: NURSE PRACTITIONER

## 2022-08-08 PROCEDURE — 3077F PR MOST RECENT SYSTOLIC BLOOD PRESSURE >= 140 MM HG: ICD-10-PCS | Mod: CPTII,S$GLB,, | Performed by: NURSE PRACTITIONER

## 2022-08-08 PROCEDURE — 99214 OFFICE O/P EST MOD 30 MIN: CPT | Mod: S$GLB,,, | Performed by: NURSE PRACTITIONER

## 2022-08-08 PROCEDURE — 1159F MED LIST DOCD IN RCRD: CPT | Mod: CPTII,S$GLB,, | Performed by: NURSE PRACTITIONER

## 2022-08-08 PROCEDURE — 82962 POCT GLUCOSE, HAND-HELD DEVICE: ICD-10-PCS | Mod: ,,, | Performed by: NURSE PRACTITIONER

## 2022-08-08 PROCEDURE — 81003 POCT URINALYSIS, DIPSTICK, AUTOMATED, W/O SCOPE: ICD-10-PCS | Mod: QW,S$GLB,, | Performed by: NURSE PRACTITIONER

## 2022-08-08 PROCEDURE — 99214 PR OFFICE/OUTPT VISIT, EST, LEVL IV, 30-39 MIN: ICD-10-PCS | Mod: S$GLB,,, | Performed by: NURSE PRACTITIONER

## 2022-08-08 PROCEDURE — 1159F PR MEDICATION LIST DOCUMENTED IN MEDICAL RECORD: ICD-10-PCS | Mod: CPTII,S$GLB,, | Performed by: NURSE PRACTITIONER

## 2022-08-08 PROCEDURE — 1160F PR REVIEW ALL MEDS BY PRESCRIBER/CLIN PHARMACIST DOCUMENTED: ICD-10-PCS | Mod: CPTII,S$GLB,, | Performed by: NURSE PRACTITIONER

## 2022-08-08 RX ORDER — TOLNAFTATE 1 %
1 AEROSOL, POWDER (GRAM) TOPICAL NIGHTLY
Qty: 21 G | Refills: 0 | Status: SHIPPED | OUTPATIENT
Start: 2022-08-08 | End: 2022-08-11

## 2022-08-08 RX ORDER — DOXYCYCLINE 100 MG/1
100 CAPSULE ORAL 2 TIMES DAILY
Qty: 14 CAPSULE | Refills: 0 | Status: SHIPPED | OUTPATIENT
Start: 2022-08-08 | End: 2022-08-15

## 2022-08-08 RX ORDER — LIDOCAINE 50 MG/G
OINTMENT TOPICAL
Qty: 30 G | Refills: 0 | Status: SHIPPED | OUTPATIENT
Start: 2022-08-08

## 2022-08-08 RX ORDER — FLUCONAZOLE 150 MG/1
150 TABLET ORAL DAILY
Qty: 4 TABLET | Refills: 0 | Status: SHIPPED | OUTPATIENT
Start: 2022-08-08 | End: 2022-08-10

## 2022-08-08 NOTE — PROGRESS NOTES
"Subjective:       Patient ID: Meg Lockhart is a 52 y.o. female.    Vitals:  height is 5' 5" (1.651 m) and weight is 98 kg (216 lb). Her temperature is 97.5 °F (36.4 °C). Her blood pressure is 161/102 (abnormal) and her pulse is 81. Her respiration is 16 and oxygen saturation is 98%.     Chief Complaint: Vaginal Itching    Couple weeks gradually worsening X Vaginal itching, burns when she scratches, "ants in her pants"   Urinary incompetence she's been unable to make it to the bathroom. This has been going on for a while so shes been wearing a pad and she thinks that is contributing it to .   she's tried- a&d ointment, neosporin, triple antibiotics, Listerine to clean it first.       Constitution: Negative for chills and fever.   Gastrointestinal: Negative for abdominal pain, vomiting and diarrhea.   Endocrine: excessive urination.   Genitourinary: Positive for dysuria, frequency, urgency, bladder incontinence, vaginal pain and vaginal discharge. Negative for flank pain, hematuria and pelvic pain.       Objective:      Physical Exam   Constitutional: She is oriented to person, place, and time. She appears well-developed.  Non-toxic appearance. She does not appear ill. No distress. obesity  HENT:   Head: Normocephalic and atraumatic.   Nose: Nose normal.   Mouth/Throat: Oropharynx is clear and moist. Mucous membranes are moist.   Eyes: Conjunctivae and EOM are normal.   Cardiovascular: Normal rate and regular rhythm.   Pulmonary/Chest: Effort normal. No respiratory distress.   Abdominal: Normal appearance. There is no left CVA tenderness and no right CVA tenderness.   Genitourinary:         Comments: Deferred     Neurological: no focal deficit. She is alert and oriented to person, place, and time.   Skin: Skin is warm, dry and not diaphoretic. Capillary refill takes 2 to 3 seconds.   Psychiatric: Her behavior is normal. Mood normal.   Nursing note and vitals reviewed.        Assessment:       1. Urinary " incontinence in female    2. Vaginal itching    3. History of type 2 diabetes mellitus    4. History of hypertension    5. Microscopic hematuria    6. Glucosuria    7. Hyperglycemia    8. Acute vulvitis    9. Burning with urination        Accucheck 306mg/dl.  Plan:         Urinary incontinence in female  -     POCT Urinalysis, Dipstick, Automated, W/O Scope    Vaginal itching  -     NuSwab Vaginitis Plus (VG+)  -     LIDOcaine (XYLOCAINE) 5 % Oint ointment; Apply topically as needed (itching, burning).  Dispense: 30 g; Refill: 0  -     clotrimazole (CLOTRIMAZOLE 3 DAY) 2 %; Place 1 applicator vaginally every evening. for 3 days  Dispense: 21 g; Refill: 0  -     fluconazole (DIFLUCAN) 150 MG Tab; Take 1 tablet (150 mg total) by mouth once daily. Repeat dosing in 1 week for 2 days  Dispense: 4 tablet; Refill: 0    History of type 2 diabetes mellitus    History of hypertension    Microscopic hematuria  -     Urine culture  -     doxycycline (MONODOX) 100 MG capsule; Take 1 capsule (100 mg total) by mouth 2 (two) times daily. for 7 days  Dispense: 14 capsule; Refill: 0    Glucosuria  -     POCT Glucose, Hand-Held Device    Hyperglycemia    Acute vulvitis  -     LIDOcaine (XYLOCAINE) 5 % Oint ointment; Apply topically as needed (itching, burning).  Dispense: 30 g; Refill: 0    Burning with urination  -     doxycycline (MONODOX) 100 MG capsule; Take 1 capsule (100 mg total) by mouth 2 (two) times daily. for 7 days  Dispense: 14 capsule; Refill: 0    Vaginal culture was collected today and will be sent off to the lab, expect results in 3-5 days.  Urine was sent off to the lab for culture, expect results in 3-5 days.  Start doxycycline twice a day for 7 days.  Diflucan take 1 pill today and 1 pill tomorrow.  Repeat dose in 1 week just the same.  Clotrimazole vaginal yeast treatment insert 1 applicator into the vagina nightly x3 nights.  Lidocaine ointment can be applied topically to the affected area to minimize itching and  burning until yeast medication has had time to start working.  Call your primary care provider tomorrow and make an appointment for close follow-up regarding your blood pressure and blood glucose that is uncontrolled at this time.    Please be advised that your blood glucose levels being as high as it is are the cause of other symptoms that you are experiencing such as increased urination, urinary incontinence, vaginal itching and irritation due to yeast that thrive in a warm moist environment in fed by sugar.      Monitor blood glucose levels every day and Monitor blood pressure every day.  Keep a log to provide 2 year primary care provider at your next appointment

## 2022-08-08 NOTE — PATIENT INSTRUCTIONS
Vaginal culture was collected today and will be sent off to the lab, expect results in 3-5 days.  Urine was sent off to the lab for culture, expect results in 3-5 days.  Start doxycycline twice a day for 7 days.  Diflucan take 1 pill today and 1 pill tomorrow.  Repeat dose in 1 week just the same.  Clotrimazole vaginal yeast treatment insert 1 applicator into the vagina nightly x3 nights.  Lidocaine ointment can be applied topically to the affected area to minimize itching and burning until yeast medication has had time to start working.  Call your primary care provider tomorrow and make an appointment for close follow-up regarding your blood pressure and blood glucose that is uncontrolled at this time.    Please be advised that your blood glucose levels being as high as it is are the cause of other symptoms that you are experiencing such as increased urination, urinary incontinence, vaginal itching and irritation due to yeast that thrive in a warm moist environment in fed by sugar.      Monitor blood glucose levels every day and Monitor blood pressure every day.  Keep a log to provide 2 year primary care provider at your next appointment

## 2022-08-13 LAB
BACTERIA UR CULT: ABNORMAL
BACTERIA UR CULT: ABNORMAL
OTHER ANTIBIOTIC SUSC ISLT: ABNORMAL

## 2022-08-14 LAB
A VAGINAE DNA VAG QL NAA+PROBE: ABNORMAL SCORE
BVAB2 DNA VAG QL NAA+PROBE: ABNORMAL SCORE
C ALBICANS DNA VAG QL NAA+PROBE: POSITIVE
C GLABRATA DNA VAG QL NAA+PROBE: POSITIVE
C TRACH DNA VAG QL NAA+PROBE: NEGATIVE
MEGA1 DNA VAG QL NAA+PROBE: ABNORMAL SCORE
N GONORRHOEA DNA VAG QL NAA+PROBE: NEGATIVE
T VAGINALIS DNA VAG QL NAA+PROBE: NEGATIVE

## 2022-08-17 ENCOUNTER — TELEPHONE (OUTPATIENT)
Dept: URGENT CARE | Facility: CLINIC | Age: 52
End: 2022-08-17
Payer: COMMERCIAL

## 2022-08-17 NOTE — TELEPHONE ENCOUNTER
----- Message from Elisabeth Reynoso NP sent at 8/14/2022  4:44 PM CDT -----  Please notify that vaginal swab came back negative for BV, GC, chlamydia, and trich.  However did come back positive for yeast.  The medications prescribed at the time of office visit should have been sufficient to treat.

## 2022-10-06 ENCOUNTER — PATIENT MESSAGE (OUTPATIENT)
Dept: BARIATRICS | Facility: CLINIC | Age: 52
End: 2022-10-06
Payer: COMMERCIAL

## 2023-02-22 ENCOUNTER — PATIENT MESSAGE (OUTPATIENT)
Dept: BARIATRICS | Facility: CLINIC | Age: 53
End: 2023-02-22
Payer: COMMERCIAL

## 2023-11-30 ENCOUNTER — OFFICE VISIT (OUTPATIENT)
Dept: URGENT CARE | Facility: CLINIC | Age: 53
End: 2023-11-30
Payer: COMMERCIAL

## 2023-11-30 VITALS
TEMPERATURE: 97 F | BODY MASS INDEX: 30.99 KG/M2 | SYSTOLIC BLOOD PRESSURE: 185 MMHG | RESPIRATION RATE: 16 BRPM | OXYGEN SATURATION: 99 % | HEIGHT: 65 IN | WEIGHT: 186 LBS | DIASTOLIC BLOOD PRESSURE: 105 MMHG | HEART RATE: 103 BPM

## 2023-11-30 DIAGNOSIS — R05.9 COUGH, UNSPECIFIED TYPE: ICD-10-CM

## 2023-11-30 DIAGNOSIS — R32 URINARY INCONTINENCE, UNSPECIFIED TYPE: ICD-10-CM

## 2023-11-30 DIAGNOSIS — I10 ELEVATED BLOOD PRESSURE READING IN OFFICE WITH DIAGNOSIS OF HYPERTENSION: ICD-10-CM

## 2023-11-30 DIAGNOSIS — R09.82 POST-NASAL DRAINAGE: Primary | ICD-10-CM

## 2023-11-30 PROCEDURE — 99214 OFFICE O/P EST MOD 30 MIN: CPT | Mod: S$GLB,,, | Performed by: NURSE PRACTITIONER

## 2023-11-30 PROCEDURE — 99214 PR OFFICE/OUTPT VISIT, EST, LEVL IV, 30-39 MIN: ICD-10-PCS | Mod: S$GLB,,, | Performed by: NURSE PRACTITIONER

## 2023-11-30 RX ORDER — BENZONATATE 200 MG/1
200 CAPSULE ORAL 3 TIMES DAILY PRN
Qty: 30 CAPSULE | Refills: 0 | Status: SHIPPED | OUTPATIENT
Start: 2023-11-30 | End: 2023-12-10

## 2023-11-30 RX ORDER — CETIRIZINE HYDROCHLORIDE 10 MG/1
10 TABLET ORAL DAILY
Qty: 30 TABLET | Refills: 0 | Status: SHIPPED | OUTPATIENT
Start: 2023-11-30

## 2023-12-01 NOTE — PATIENT INSTRUCTIONS
"                                                         URI   If your condition worsens or fails to improve we recommend that you receive another evaluation at the ER immediately or contact your PCP to discuss your concerns or return here. You must understand that you've received an urgent care treatment only and that you may be released before all your medical problems are known or treated. You the patient will arrange for follouwp care as instructed.   If we discussed that I think your illness is viral, it will not respond to antibiotics and will last 5-7 days. If we discussed "wait and see" antibiotics and if over the next few days the symptoms worsen start the antibiotics I have given you.   -  If you are female and on BCP and do take the antibiotics, use additional methods to prevent pregnancy while on the antibiotics and for one cycle after.   -  Flonase (fluticasone) is a nasal spray which is available over the counter and may help with your symptoms.   -  Zyrtec D, Claritin D or Allegra D can also help with symptoms of congestion and drainage.   -  If you have hypertension avoid using the "D" which is the decongestant.  Instead you can use Coricidin HBP for cold and cough symptoms.    -  If you just have drainage you can take plain Zyrtec, Claritin or Allegra   -  If you just have a congested feeling you can take pseudoephedrine (unless you have high blood pressure) which you have to sign for behind the counter. Do not buy the phenylephrine which is on the shelf as it is not effective   -  Rest and fluids are also important.   -  Tylenol or ibuprofen can also be used as directed for pain unless you have an allergy to them or medical condition such as stomach ulcers, kidney or liver disease or blood thinners etc for which you should not be taking these type of medications.   -  If you are flying in the next few days Afrin nose drops for the airplane flight upon take off and landing may help. Other than at " those times refrain from using afrin.   - If you were prescribed a narcotic do not drive or operate heavy machinery while taking these medications.      Elevated Blood Pressure Reading  Your Blood Pressure was elevated on today.  You should recheck your blood pressure twice a day for the next 2 weeks while follow up with your PCP at your next visit regarding today's reading.    If you have any chest pain, shortness or breath, palpitations, headache, visual disturbances, ect, you should go to the Er.    In the meantime, we recommend you schedule an appointment with your PCP in the next 2-3 days for a recheck of your blood pressure.

## 2023-12-01 NOTE — PROGRESS NOTES
" Subjective:      Patient ID: Meg Lockhart is a 53 y.o. female.    Vitals:  height is 5' 5" (1.651 m) and weight is 84.4 kg (186 lb). Her oral temperature is 97 °F (36.1 °C). Her blood pressure is 185/105 (abnormal) and her pulse is 103. Her respiration is 16 and oxygen saturation is 99%.     Chief Complaint: Cough    Cough and post nasal drip X 2 weeks.   Patient has tried OTC cough suppressants, with no relief.     Cough  This is a new problem. The current episode started 1 to 4 weeks ago. The cough is Non-productive. Associated symptoms include postnasal drip and a sore throat. She has tried OTC cough suppressant for the symptoms.       HENT:  Positive for postnasal drip and sore throat.    Respiratory:  Positive for cough.       Objective:     Physical Exam   Constitutional: She is cooperative.  Non-toxic appearance. She does not appear ill. No distress.   HENT:   Head: Normocephalic.   Ears:   Right Ear: Hearing, tympanic membrane, external ear and ear canal normal.   Left Ear: Hearing, tympanic membrane, external ear and ear canal normal.   Nose: Nose normal.   Mouth/Throat: Uvula is midline and mucous membranes are normal. Mucous membranes are moist. No posterior oropharyngeal erythema. Oropharynx is clear.      Comments: Post nasal drainage  Eyes: Extraocular movement intact vision grossly intact   Pulmonary/Chest: Effort normal and breath sounds normal. She has no decreased breath sounds. She has no wheezes.   No acute respiratory distress. Able to speak in full sentences without difficulty or pause         Comments: +dry cough and No acute respiratory distress. Able to speak in full sentences without difficulty or pause    Abdominal: Normal appearance. She exhibits no distension.   Genitourinary:         Comments: Hx of urinary incontinence ( worse with coughing)     Neurological: no focal deficit. She is alert.   Skin: Skin is not diaphoretic.   Nursing note and vitals reviewed.      Assessment: " "    1. Post-nasal drainage    2. Cough, unspecified type    3. Elevated blood pressure reading in office with diagnosis of hypertension    4. Urinary incontinence, unspecified type        Plan:   Educated on importance of taking blood pressure medicine. Pt states she will take it when she gets home tonight.  Start supportive care as directed  Keep GYN appt for incontinence as discussed    Post-nasal drainage  -     cetirizine (ZYRTEC) 10 MG tablet; Take 1 tablet (10 mg total) by mouth once daily.  Dispense: 30 tablet; Refill: 0    Cough, unspecified type  -     benzonatate (TESSALON) 200 MG capsule; Take 1 capsule (200 mg total) by mouth 3 (three) times daily as needed for Cough.  Dispense: 30 capsule; Refill: 0    Elevated blood pressure reading in office with diagnosis of hypertension    Urinary incontinence, unspecified type      Patient Instructions                                                            URI   If your condition worsens or fails to improve we recommend that you receive another evaluation at the ER immediately or contact your PCP to discuss your concerns or return here. You must understand that you've received an urgent care treatment only and that you may be released before all your medical problems are known or treated. You the patient will arrange for follouwp care as instructed.   If we discussed that I think your illness is viral, it will not respond to antibiotics and will last 5-7 days. If we discussed "wait and see" antibiotics and if over the next few days the symptoms worsen start the antibiotics I have given you.   -  If you are female and on BCP and do take the antibiotics, use additional methods to prevent pregnancy while on the antibiotics and for one cycle after.   -  Flonase (fluticasone) is a nasal spray which is available over the counter and may help with your symptoms.   -  Zyrtec D, Claritin D or Allegra D can also help with symptoms of congestion and drainage.   -  If you " "have hypertension avoid using the "D" which is the decongestant.  Instead you can use Coricidin HBP for cold and cough symptoms.    -  If you just have drainage you can take plain Zyrtec, Claritin or Allegra   -  If you just have a congested feeling you can take pseudoephedrine (unless you have high blood pressure) which you have to sign for behind the counter. Do not buy the phenylephrine which is on the shelf as it is not effective   -  Rest and fluids are also important.   -  Tylenol or ibuprofen can also be used as directed for pain unless you have an allergy to them or medical condition such as stomach ulcers, kidney or liver disease or blood thinners etc for which you should not be taking these type of medications.   -  If you are flying in the next few days Afrin nose drops for the airplane flight upon take off and landing may help. Other than at those times refrain from using afrin.   - If you were prescribed a narcotic do not drive or operate heavy machinery while taking these medications.      Elevated Blood Pressure Reading  Your Blood Pressure was elevated on today.  You should recheck your blood pressure twice a day for the next 2 weeks while follow up with your PCP at your next visit regarding today's reading.    If you have any chest pain, shortness or breath, palpitations, headache, visual disturbances, ect, you should go to the Er.    In the meantime, we recommend you schedule an appointment with your PCP in the next 2-3 days for a recheck of your blood pressure.                 "

## 2024-01-07 ENCOUNTER — OFFICE VISIT (OUTPATIENT)
Dept: URGENT CARE | Facility: CLINIC | Age: 54
End: 2024-01-07
Payer: COMMERCIAL

## 2024-01-07 VITALS
SYSTOLIC BLOOD PRESSURE: 180 MMHG | WEIGHT: 185 LBS | BODY MASS INDEX: 30.82 KG/M2 | HEART RATE: 88 BPM | HEIGHT: 65 IN | OXYGEN SATURATION: 98 % | DIASTOLIC BLOOD PRESSURE: 98 MMHG | RESPIRATION RATE: 18 BRPM | TEMPERATURE: 97 F

## 2024-01-07 DIAGNOSIS — B37.49 GENITOURINARY INFECTION, CANDIDAL: ICD-10-CM

## 2024-01-07 DIAGNOSIS — B37.0 CANDIDA, ORAL: Primary | ICD-10-CM

## 2024-01-07 PROCEDURE — 99214 OFFICE O/P EST MOD 30 MIN: CPT | Mod: S$GLB,,,

## 2024-01-07 RX ORDER — NYSTATIN 100000 [USP'U]/ML
5 SUSPENSION ORAL 4 TIMES DAILY
Qty: 200 ML | Refills: 0 | Status: SHIPPED | OUTPATIENT
Start: 2024-01-07 | End: 2024-01-17

## 2024-01-07 RX ORDER — FUROSEMIDE 20 MG/1
TABLET ORAL
COMMUNITY
Start: 2023-11-15

## 2024-01-07 RX ORDER — FLUCONAZOLE 150 MG/1
150 TABLET ORAL DAILY
Qty: 1 TABLET | Refills: 0 | Status: SHIPPED | OUTPATIENT
Start: 2024-01-07 | End: 2024-01-08

## 2024-01-07 NOTE — PROGRESS NOTES
"Subjective:      Patient ID: Meg Lockhart is a 53 y.o. female.    Vitals:  height is 5' 5" (1.651 m) and weight is 83.9 kg (185 lb). Her oral temperature is 97.4 °F (36.3 °C). Her blood pressure is 180/98 (abnormal) and her pulse is 88. Her respiration is 18 and oxygen saturation is 98%.     Chief Complaint: tongue pain     Pt presents to  today for tongue pain. She states that it feels like her mouth is inflamed. She states that she when she eats and drinks her tongue burns. ( Citrus is worse) Pt states that this has been going on for a few weeks now. She has been trying home remedies thinking that it would go away.     She also believes she has a vaginal yeast infection.  Requesting treatment.    Constitution: Negative for fever.   HENT:  Positive for dental problem and tongue pain.    Eyes: Negative.    Respiratory:  Positive for cough.    Gastrointestinal: Negative.    Genitourinary:  Positive for vaginal discharge.      Objective:     Physical Exam   Constitutional: She is oriented to person, place, and time. She appears well-developed. She is cooperative. obesity  HENT:   Head: Normocephalic and atraumatic.   Ears:   Right Ear: Hearing, tympanic membrane, external ear and ear canal normal.   Left Ear: Hearing, tympanic membrane, external ear and ear canal normal.   Nose: Nose normal. No mucosal edema or nasal deformity. No epistaxis. Right sinus exhibits no maxillary sinus tenderness and no frontal sinus tenderness. Left sinus exhibits no maxillary sinus tenderness and no frontal sinus tenderness.   Mouth/Throat: Uvula is midline and mucous membranes are normal. Mucous membranes are moist. No trismus in the jaw. Normal dentition. No uvula swelling. Oropharyngeal exudate present.       Eyes: Conjunctivae and lids are normal. Pupils are equal, round, and reactive to light. Extraocular movement intact   Neck: Trachea normal and phonation normal. Neck supple.   Cardiovascular: Normal rate, regular rhythm, " normal heart sounds and normal pulses.   Pulmonary/Chest: Effort normal and breath sounds normal.   Abdominal: Normal appearance.   Musculoskeletal: Normal range of motion.         General: Normal range of motion.   Neurological: no focal deficit. She is alert, oriented to person, place, and time and at baseline. She exhibits normal muscle tone.   Skin: Skin is warm, dry and intact. Capillary refill takes 2 to 3 seconds.   Psychiatric: Her speech is normal and behavior is normal. Mood, judgment and thought content normal.   Nursing note and vitals reviewed.      Assessment:     1. Candida, oral    2. Genitourinary infection, candidal        Plan:       Candida, oral  -     nystatin (MYCOSTATIN) 100,000 unit/mL suspension; Take 5 mLs (500,000 Units total) by mouth 4 (four) times daily. for 10 days  Dispense: 200 mL; Refill: 0    Genitourinary infection, candidal  -     fluconazole (DIFLUCAN) 150 MG Tab; Take 1 tablet (150 mg total) by mouth once daily. for 1 day  Dispense: 1 tablet; Refill: 0

## 2024-06-02 ENCOUNTER — HOSPITAL ENCOUNTER (EMERGENCY)
Facility: HOSPITAL | Age: 54
Discharge: HOME | End: 2024-06-02
Attending: EMERGENCY MEDICINE
Payer: COMMERCIAL

## 2024-06-02 VITALS
SYSTOLIC BLOOD PRESSURE: 176 MMHG | RESPIRATION RATE: 16 BRPM | HEIGHT: 65 IN | OXYGEN SATURATION: 100 % | DIASTOLIC BLOOD PRESSURE: 88 MMHG | HEART RATE: 86 BPM | WEIGHT: 185 LBS | BODY MASS INDEX: 30.82 KG/M2 | TEMPERATURE: 98 F

## 2024-06-02 DIAGNOSIS — R31.9 URINARY TRACT INFECTION WITH HEMATURIA, SITE UNSPECIFIED: Primary | ICD-10-CM

## 2024-06-02 DIAGNOSIS — R53.1 WEAKNESS: ICD-10-CM

## 2024-06-02 DIAGNOSIS — R42 DIZZINESS: ICD-10-CM

## 2024-06-02 DIAGNOSIS — N39.0 URINARY TRACT INFECTION WITH HEMATURIA, SITE UNSPECIFIED: Primary | ICD-10-CM

## 2024-06-02 LAB
ALBUMIN SERPL BCP-MCNC: 3.3 G/DL (ref 3.5–5.2)
ALP SERPL-CCNC: 81 U/L (ref 55–135)
ALT SERPL W/O P-5'-P-CCNC: 16 U/L (ref 10–44)
ANION GAP SERPL CALC-SCNC: 10 MMOL/L (ref 8–16)
AST SERPL-CCNC: 13 U/L (ref 10–40)
BACTERIA #/AREA URNS HPF: ABNORMAL /HPF
BASOPHILS # BLD AUTO: 0.03 K/UL (ref 0–0.2)
BASOPHILS NFR BLD: 0.5 % (ref 0–1.9)
BILIRUB SERPL-MCNC: 0.3 MG/DL (ref 0.1–1)
BILIRUB UR QL STRIP: NEGATIVE
BUN SERPL-MCNC: 12 MG/DL (ref 6–20)
CALCIUM SERPL-MCNC: 9.3 MG/DL (ref 8.7–10.5)
CHLORIDE SERPL-SCNC: 104 MMOL/L (ref 95–110)
CLARITY UR: ABNORMAL
CO2 SERPL-SCNC: 25 MMOL/L (ref 23–29)
COLOR UR: YELLOW
CREAT SERPL-MCNC: 1.1 MG/DL (ref 0.5–1.4)
DIFFERENTIAL METHOD BLD: ABNORMAL
EOSINOPHIL # BLD AUTO: 0.1 K/UL (ref 0–0.5)
EOSINOPHIL NFR BLD: 1 % (ref 0–8)
ERYTHROCYTE [DISTWIDTH] IN BLOOD BY AUTOMATED COUNT: 12.6 % (ref 11.5–14.5)
EST. GFR  (NO RACE VARIABLE): >60 ML/MIN/1.73 M^2
GLUCOSE SERPL-MCNC: 336 MG/DL (ref 70–110)
GLUCOSE UR QL STRIP: ABNORMAL
HCT VFR BLD AUTO: 36.6 % (ref 37–48.5)
HGB BLD-MCNC: 11.9 G/DL (ref 12–16)
HGB UR QL STRIP: ABNORMAL
HYALINE CASTS #/AREA URNS LPF: 0 /LPF
IMM GRANULOCYTES # BLD AUTO: 0.01 K/UL (ref 0–0.04)
IMM GRANULOCYTES NFR BLD AUTO: 0.2 % (ref 0–0.5)
KETONES UR QL STRIP: NEGATIVE
LEUKOCYTE ESTERASE UR QL STRIP: ABNORMAL
LIPASE SERPL-CCNC: 39 U/L (ref 4–60)
LYMPHOCYTES # BLD AUTO: 1.8 K/UL (ref 1–4.8)
LYMPHOCYTES NFR BLD: 28 % (ref 18–48)
MCH RBC QN AUTO: 29.3 PG (ref 27–31)
MCHC RBC AUTO-ENTMCNC: 32.5 G/DL (ref 32–36)
MCV RBC AUTO: 90 FL (ref 82–98)
MICROSCOPIC COMMENT: ABNORMAL
MONOCYTES # BLD AUTO: 0.3 K/UL (ref 0.3–1)
MONOCYTES NFR BLD: 5.1 % (ref 4–15)
NEUTROPHILS # BLD AUTO: 4.1 K/UL (ref 1.8–7.7)
NEUTROPHILS NFR BLD: 65.2 % (ref 38–73)
NITRITE UR QL STRIP: NEGATIVE
NRBC BLD-RTO: 0 /100 WBC
PH UR STRIP: 6 [PH] (ref 5–8)
PLATELET # BLD AUTO: 301 K/UL (ref 150–450)
PMV BLD AUTO: 10.3 FL (ref 9.2–12.9)
POTASSIUM SERPL-SCNC: 4 MMOL/L (ref 3.5–5.1)
PROT SERPL-MCNC: 6.7 G/DL (ref 6–8.4)
PROT UR QL STRIP: ABNORMAL
RBC # BLD AUTO: 4.06 M/UL (ref 4–5.4)
RBC #/AREA URNS HPF: 2 /HPF (ref 0–4)
SODIUM SERPL-SCNC: 139 MMOL/L (ref 136–145)
SP GR UR STRIP: 1.02 (ref 1–1.03)
SQUAMOUS #/AREA URNS HPF: 6 /HPF
URN SPEC COLLECT METH UR: ABNORMAL
UROBILINOGEN UR STRIP-ACNC: NEGATIVE EU/DL
WBC # BLD AUTO: 6.24 K/UL (ref 3.9–12.7)
WBC #/AREA URNS HPF: >100 /HPF (ref 0–5)
WBC CLUMPS URNS QL MICRO: ABNORMAL
YEAST URNS QL MICRO: ABNORMAL

## 2024-06-02 PROCEDURE — 85025 COMPLETE CBC W/AUTO DIFF WBC: CPT | Performed by: PHYSICIAN ASSISTANT

## 2024-06-02 PROCEDURE — 87086 URINE CULTURE/COLONY COUNT: CPT | Performed by: PHYSICIAN ASSISTANT

## 2024-06-02 PROCEDURE — 83690 ASSAY OF LIPASE: CPT | Performed by: PHYSICIAN ASSISTANT

## 2024-06-02 PROCEDURE — 93005 ELECTROCARDIOGRAM TRACING: CPT

## 2024-06-02 PROCEDURE — 25000003 PHARM REV CODE 250: Performed by: PHYSICIAN ASSISTANT

## 2024-06-02 PROCEDURE — 87186 SC STD MICRODIL/AGAR DIL: CPT | Performed by: PHYSICIAN ASSISTANT

## 2024-06-02 PROCEDURE — 81000 URINALYSIS NONAUTO W/SCOPE: CPT | Performed by: PHYSICIAN ASSISTANT

## 2024-06-02 PROCEDURE — 80053 COMPREHEN METABOLIC PANEL: CPT | Performed by: PHYSICIAN ASSISTANT

## 2024-06-02 PROCEDURE — 93010 ELECTROCARDIOGRAM REPORT: CPT | Mod: ,,, | Performed by: GENERAL PRACTICE

## 2024-06-02 PROCEDURE — 36415 COLL VENOUS BLD VENIPUNCTURE: CPT | Performed by: PHYSICIAN ASSISTANT

## 2024-06-02 PROCEDURE — 96360 HYDRATION IV INFUSION INIT: CPT

## 2024-06-02 PROCEDURE — 99285 EMERGENCY DEPT VISIT HI MDM: CPT | Mod: 25

## 2024-06-02 RX ORDER — ONDANSETRON 4 MG/1
4 TABLET, ORALLY DISINTEGRATING ORAL EVERY 8 HOURS PRN
Qty: 20 TABLET | Refills: 0 | Status: ON HOLD | OUTPATIENT
Start: 2024-06-02

## 2024-06-02 RX ORDER — NITROFURANTOIN 25; 75 MG/1; MG/1
100 CAPSULE ORAL 2 TIMES DAILY
Qty: 10 CAPSULE | Refills: 0 | Status: SHIPPED | OUTPATIENT
Start: 2024-06-02 | End: 2024-06-07

## 2024-06-02 RX ORDER — MECLIZINE HYDROCHLORIDE 25 MG/1
25 TABLET ORAL
Status: COMPLETED | OUTPATIENT
Start: 2024-06-02 | End: 2024-06-02

## 2024-06-02 RX ADMIN — SODIUM CHLORIDE 1000 ML: 9 INJECTION, SOLUTION INTRAVENOUS at 10:06

## 2024-06-02 RX ADMIN — MECLIZINE HYDROCHLORIDE 25 MG: 25 TABLET ORAL at 10:06

## 2024-06-02 NOTE — ED PROVIDER NOTES
Encounter Date: 6/2/2024       History     Chief Complaint   Patient presents with    Nausea    Vomiting    Weakness    Abdominal Pain     Patient is a 54-year-old female who presents with multiple complaints.  She has past medical history significant for anxiety, diabetes, GERD, hypertension and vertigo.  She reports dizziness over the last week that has progressively worsened.  She woke up this morning and felt as if she could not raise her arms bilaterally.  She feels as if she is weak and fatigued.  She reports associated nausea and vomiting.  She denies visual changes.  She states chronic headaches which are unchanged.  She reports no speech difficulty.  She states her symptoms are currently improving.  She had not taken any medication for her symptoms.        Review of patient's allergies indicates:   Allergen Reactions    Penicillins Itching and Anaphylaxis    Sulfa (sulfonamide antibiotics) Anaphylaxis    Meperidine Itching     Past Medical History:   Diagnosis Date    Anxiety     Diabetes mellitus     Elevated cholesterol     GERD (gastroesophageal reflux disease)     Hypertension     PUD (peptic ulcer disease)      Past Surgical History:   Procedure Laterality Date    GASTRIC BYPASS      HYSTERECTOMY       Family History   Problem Relation Name Age of Onset    Heart disease Mother 39     Hypertension Mother 39     Heart attack Mother 39     Stroke Brother 28     Cancer Maternal Aunt      Cancer Maternal Grandmother          lung    Heart disease Maternal Grandmother      Diabetes Paternal Grandmother      Heart disease Paternal Grandmother      Diabetes Paternal Grandfather       Social History     Tobacco Use    Smoking status: Never    Smokeless tobacco: Never   Substance Use Topics    Alcohol use: No    Drug use: No     Review of Systems    Physical Exam     Initial Vitals   BP Pulse Resp Temp SpO2   06/02/24 0948 06/02/24 0948 06/02/24 0959 06/02/24 0948 06/02/24 0948   (!) 155/90 88 16 97.7 °F (36.5  °C) 99 %      MAP       --                Physical Exam    ED Course   Procedures  Labs Reviewed   CBC W/ AUTO DIFFERENTIAL - Abnormal; Notable for the following components:       Result Value    Hemoglobin 11.9 (*)     Hematocrit 36.6 (*)     All other components within normal limits   COMPREHENSIVE METABOLIC PANEL - Abnormal; Notable for the following components:    Glucose 336 (*)     Albumin 3.3 (*)     All other components within normal limits   URINALYSIS, REFLEX TO URINE CULTURE - Abnormal; Notable for the following components:    Appearance, UA Hazy (*)     Protein, UA 1+ (*)     Glucose, UA 4+ (*)     Occult Blood UA Trace (*)     Leukocytes, UA 2+ (*)     All other components within normal limits    Narrative:     Specimen Source->Urine   URINALYSIS MICROSCOPIC - Abnormal; Notable for the following components:    WBC, UA >100 (*)     WBC Clumps, UA Few (*)     Bacteria Many (*)     All other components within normal limits    Narrative:     Specimen Source->Urine   CULTURE, URINE   LIPASE     EKG Readings: (Independently Interpreted)   Initial Reading: No STEMI. Rhythm: Normal Sinus Rhythm. Heart Rate: 87. Ectopy: No Ectopy. Conduction: Normal.       Imaging Results              CT Head Without Contrast (Final result)  Result time 06/02/24 10:59:50      Final result by Samir Underwood MD (06/02/24 10:59:50)                   Impression:      No intracranial hemorrhage.  No evidence for recent ischemia, noting however that MRI could add sensitivity in an acute setting.      Electronically signed by: Samir Underwood  Date:    06/02/2024  Time:    10:59               Narrative:    EXAMINATION:  CT HEAD WITHOUT CONTRAST    CLINICAL HISTORY:  Neuro deficit, acute, stroke suspected;    TECHNIQUE:  Low dose axial images were obtained through the head.  Coronal and sagittal reformations were also performed. Contrast was not administered.    COMPARISON:  06/14/2021    FINDINGS:  Normal size of the ventricular  system.    No mass, hemorrhage or acute major vascular distribution infarct. No mass effect or midline shift. Included orbits are unremarkable. Paranasal sinuses and mastoid air cells are clear. Atherosclerosis.  No acute osseous abnormality.                                       Medications   sodium chloride 0.9% bolus 1,000 mL 1,000 mL (0 mLs Intravenous Stopped 6/2/24 1159)   meclizine tablet 25 mg (25 mg Oral Given 6/2/24 1044)     Medical Decision Making  Emergent evaluation of a 54-year-old female who presents with dizziness for 1 week.  She reports waking up this morning and having weakness to bilateral upper extremities.  She reports chronic headache that is unchanged.  She is alert, oriented and well-appearing.  She has 4/5 strength bilateral upper extremities with an otherwise normal neurological exam.  CT of the head is unremarkable.  Labs show hyperglycemia.  She states that she is currently working with her primary care provider to manage this.  Urinalysis consistent with urinary tract infection.  No sign of pyelonephritis.  Patient discussed with Neurology, Dr. Gallagher, who recommends MRI.  Patient refuses.  In-depth conversation with patient regarding inability to rule out other acute process without MRI being obtained.  She voiced understanding.  She has aware of the risk of leaving without this test being performed.  She was given meclizine and IV fluids with improvement in her symptoms.  Recommend close follow-up with Neurology. Discussed results with patient. Return precautions given. Based on my clinical evaluation, I do not appreciate any immediate, emergent, or life threatening condition or etiology that warrants additional workup today and feel that the patient can be discharged with close follow up care.  Patient is to follow up with their primary care provider. Case was discussed with Dr. Simental who is in agreement with the plan of care. All questions answered.       Amount and/or Complexity of  Data Reviewed  Independent Historian: spouse  External Data Reviewed: labs, radiology, ECG and notes.  Labs: ordered.  Radiology: ordered.  ECG/medicine tests: ordered.    Risk  OTC drugs.  Prescription drug management.              Attending Attestation:     Physician Attestation Statement for NP/PA:   I personally made/approved the management plan and take responsibility for the patient management.    Other NP/PA Attestation Additions:    History of Present Illness: 54-year-old female presented with multiple complaints    Medical Decision Making: Initial differential diagnosis included but not limited to cardiac arrhythmia, UTI, and dehydration.  I am in agreement with the physician assistant's assessment, treatment, and plan of care.                                    Clinical Impression:  Final diagnoses:  [R42] Dizziness  [N39.0, R31.9] Urinary tract infection with hematuria, site unspecified (Primary)  [R53.1] Weakness          ED Disposition Condition    Discharge Stable          ED Prescriptions       Medication Sig Dispense Start Date End Date Auth. Provider    ondansetron (ZOFRAN-ODT) 4 MG TbDL Take 1 tablet (4 mg total) by mouth every 8 (eight) hours as needed (nausea/vomiting). 20 tablet 6/2/2024 -- Kiah Whitman PA-C    nitrofurantoin, macrocrystal-monohydrate, (MACROBID) 100 MG capsule Take 1 capsule (100 mg total) by mouth 2 (two) times daily. for 5 days 10 capsule 6/2/2024 6/7/2024 Kiah Whitman PA-C          Follow-up Information       Follow up With Specialties Details Why Contact Info Additional Information    Jair Tejada MD Family Medicine   [id number]  [facility] [address]  [facility] [id number]  [phone]       [facility] - ED Emergency Medicine  As needed [address] [place] [address] [address] [place] [place] [address] 1st floor             Kiah Whitman PA-C  06/02/24 1331       Tho Simental MD  06/02/24 2471

## 2024-06-02 NOTE — LETTER
Patient: Vandana Lockhart  YOB: 1970  Date: 6/2/2024 Time: 12:32 PM  Location: [facility]    Leaving the Hospital Against Medical Advice    Chart #:[id number]    This will certify that I, the undersigned,    ______________________________________________________________________    A patient in the above named medical center, having requested to not have the MRI completed here in the Emergency room, I hereby release [facility], its physicians, APPs, officers and employees, severally and individually, from any and all liability of any nature whatsoever for any injury or harm or complication of any kind that may result directly or indirectly, by reason of my terminating my stay as a patient at [facility] without the MRI and my departure from [facility], and hereby waive any and all rights of action I may now have or later acquire as a result of my voluntary departure from [facility] and the termination of my stay as a patient therein.    This release is made with the full knowledge of the danger that may result from the action which I am taking.      Date:_______________________                         ___________________________                                                                                    Patient/Legal Representative    Witness:        ____________________________                          ___________________________  Nurse                                                                        Physician

## 2024-06-02 NOTE — DISCHARGE INSTRUCTIONS
Your urine test did come back positive for a bladder infection. I have sent antibiotics to your pharmacy. Please drink plenty of water. Follow up with your neurologist. Return for any new or worsening symptoms.

## 2024-06-02 NOTE — ED NOTES
Assumed care:  Meg Lockhart is awake, alert and oriented x 3, skin warm and dry, in NAD with family at bedside.  Patient arrived via EMS for N&V and weakness that started this morning.  Per family, patient has had at least 10 episodes of vomiting this morning.  Patient states that she feels very weak.  Denies abd pain or diarrhea at this time.    Patient identifiers for Meg Lockhart checked and correct.  LOC:  Meg Lockhart is awake, alert, and aware of environment with an appropriate affect. She is oriented x 3 and speaking appropriately.  APPEARANCE:  She is resting comfortably and in no acute distress. She is clean and well groomed, patient's clothing is properly fastened.  SKIN:  The skin is warm and dry. She has normal skin turgor and moist mucus membranes. Skin is intact; no bruising or breakdown noted.  MUSCULOSKELETAL:  She is moving all extremities well, no obvious deformities noted. Pulses intact.   RESPIRATORY:  Airway is open and patent. Respirations are spontaneous and non-labored with normal effort and rate.  CARDIAC:  She has a normal rate and rhythm. No peripheral edema noted. Capillary refill < 3 seconds.  ABDOMEN:  No distention noted.  Soft and non-tender upon palpation.  N&V  NEUROLOGICAL:  PERRL. Facial expression is symmetrical. Hand grasps are equal bilaterally. Normal sensation in all extremities when touched with finger.  Muscle weakness  Allergies reported:    Review of patient's allergies indicates:   Allergen Reactions    Penicillins Itching and Anaphylaxis    Sulfa (sulfonamide antibiotics) Anaphylaxis    Meperidine Itching

## 2024-06-04 LAB
BACTERIA UR CULT: ABNORMAL
OHS QRS DURATION: 82 MS
OHS QTC CALCULATION: 478 MS

## 2024-06-18 ENCOUNTER — HOSPITAL ENCOUNTER (INPATIENT)
Facility: HOSPITAL | Age: 54
LOS: 3 days | Discharge: HOME | DRG: 066 | End: 2024-06-21
Attending: EMERGENCY MEDICINE | Admitting: HOSPITALIST
Payer: COMMERCIAL

## 2024-06-18 DIAGNOSIS — R11.2 NAUSEA AND VOMITING: ICD-10-CM

## 2024-06-18 DIAGNOSIS — R11.2 NAUSEA & VOMITING: ICD-10-CM

## 2024-06-18 DIAGNOSIS — I63.9 STROKE: ICD-10-CM

## 2024-06-18 DIAGNOSIS — R42 DIZZINESS: ICD-10-CM

## 2024-06-18 DIAGNOSIS — I63.9 CEREBELLAR STROKE, ACUTE: Primary | ICD-10-CM

## 2024-06-18 DIAGNOSIS — E11.9 DIABETES MELLITUS WITHOUT COMPLICATION: ICD-10-CM

## 2024-06-18 LAB
ALBUMIN SERPL BCP-MCNC: 3.7 G/DL (ref 3.5–5.2)
ALLENS TEST: ABNORMAL
ALP SERPL-CCNC: 74 U/L (ref 55–135)
ALT SERPL W/O P-5'-P-CCNC: 13 U/L (ref 10–44)
ANION GAP SERPL CALC-SCNC: 9 MMOL/L (ref 8–16)
AST SERPL-CCNC: 17 U/L (ref 10–40)
B-OH-BUTYR BLD STRIP-SCNC: 0.1 MMOL/L (ref 0–0.5)
BACTERIA #/AREA URNS HPF: ABNORMAL /HPF
BASOPHILS # BLD AUTO: 0.02 K/UL (ref 0–0.2)
BASOPHILS NFR BLD: 0.3 % (ref 0–1.9)
BILIRUB SERPL-MCNC: 0.3 MG/DL (ref 0.1–1)
BILIRUB UR QL STRIP: NEGATIVE
BUN SERPL-MCNC: 16 MG/DL (ref 6–20)
CALCIUM SERPL-MCNC: 9 MG/DL (ref 8.7–10.5)
CHLORIDE SERPL-SCNC: 101 MMOL/L (ref 95–110)
CHOLEST SERPL-MCNC: 140 MG/DL (ref 120–199)
CHOLEST/HDLC SERPL: 2.6 {RATIO} (ref 2–5)
CLARITY UR: CLEAR
CO2 SERPL-SCNC: 24 MMOL/L (ref 23–29)
COLOR UR: YELLOW
CREAT SERPL-MCNC: 1 MG/DL (ref 0.5–1.4)
DELSYS: ABNORMAL
DIFFERENTIAL METHOD BLD: NORMAL
EOSINOPHIL # BLD AUTO: 0.1 K/UL (ref 0–0.5)
EOSINOPHIL NFR BLD: 1.1 % (ref 0–8)
ERYTHROCYTE [DISTWIDTH] IN BLOOD BY AUTOMATED COUNT: 12.4 % (ref 11.5–14.5)
EST. GFR  (NO RACE VARIABLE): >60 ML/MIN/1.73 M^2
GLUCOSE SERPL-MCNC: 264 MG/DL (ref 70–110)
GLUCOSE SERPL-MCNC: 362 MG/DL (ref 70–110)
GLUCOSE UR QL STRIP: ABNORMAL
HCO3 UR-SCNC: 25.7 MMOL/L (ref 24–28)
HCT VFR BLD AUTO: 38.5 % (ref 37–48.5)
HDLC SERPL-MCNC: 54 MG/DL (ref 40–75)
HDLC SERPL: 38.6 % (ref 20–50)
HGB BLD-MCNC: 12.5 G/DL (ref 12–16)
HGB UR QL STRIP: ABNORMAL
HYALINE CASTS #/AREA URNS LPF: 0 /LPF
IMM GRANULOCYTES # BLD AUTO: 0.02 K/UL (ref 0–0.04)
IMM GRANULOCYTES NFR BLD AUTO: 0.3 % (ref 0–0.5)
INFLUENZA A, MOLECULAR: NEGATIVE
INFLUENZA B, MOLECULAR: NEGATIVE
KETONES UR QL STRIP: NEGATIVE
LDLC SERPL CALC-MCNC: 55 MG/DL (ref 63–159)
LEUKOCYTE ESTERASE UR QL STRIP: NEGATIVE
LIPASE SERPL-CCNC: 56 U/L (ref 4–60)
LYMPHOCYTES # BLD AUTO: 3.1 K/UL (ref 1–4.8)
LYMPHOCYTES NFR BLD: 42.3 % (ref 18–48)
MAGNESIUM SERPL-MCNC: 1.7 MG/DL (ref 1.6–2.6)
MCH RBC QN AUTO: 28.8 PG (ref 27–31)
MCHC RBC AUTO-ENTMCNC: 32.5 G/DL (ref 32–36)
MCV RBC AUTO: 89 FL (ref 82–98)
MICROSCOPIC COMMENT: ABNORMAL
MONOCYTES # BLD AUTO: 0.4 K/UL (ref 0.3–1)
MONOCYTES NFR BLD: 5 % (ref 4–15)
NEUTROPHILS # BLD AUTO: 3.8 K/UL (ref 1.8–7.7)
NEUTROPHILS NFR BLD: 51 % (ref 38–73)
NITRITE UR QL STRIP: NEGATIVE
NONHDLC SERPL-MCNC: 86 MG/DL
NRBC BLD-RTO: 0 /100 WBC
OHS QRS DURATION: 80 MS
OHS QTC CALCULATION: 445 MS
OSMOLALITY SERPL: 301 MOSM/KG (ref 275–295)
PCO2 BLDA: 44.2 MMHG (ref 35–45)
PH SMN: 7.37 [PH] (ref 7.35–7.45)
PH UR STRIP: 6 [PH] (ref 5–8)
PHOSPHATE SERPL-MCNC: 2.9 MG/DL (ref 2.7–4.5)
PLATELET # BLD AUTO: 286 K/UL (ref 150–450)
PMV BLD AUTO: 10.5 FL (ref 9.2–12.9)
PO2 BLDA: 26 MMHG (ref 40–60)
POC BE: 0 MMOL/L
POC SATURATED O2: 45 % (ref 95–100)
POC TCO2: 27 MMOL/L (ref 24–29)
POTASSIUM SERPL-SCNC: 3.9 MMOL/L (ref 3.5–5.1)
PROT SERPL-MCNC: 6.7 G/DL (ref 6–8.4)
PROT UR QL STRIP: ABNORMAL
RBC # BLD AUTO: 4.34 M/UL (ref 4–5.4)
RBC #/AREA URNS HPF: 14 /HPF (ref 0–4)
SAMPLE: ABNORMAL
SARS-COV-2 RDRP RESP QL NAA+PROBE: NEGATIVE
SITE: ABNORMAL
SODIUM SERPL-SCNC: 134 MMOL/L (ref 136–145)
SP GR UR STRIP: 1.01 (ref 1–1.03)
SPECIMEN SOURCE: NORMAL
SQUAMOUS #/AREA URNS HPF: 5 /HPF
TRIGL SERPL-MCNC: 155 MG/DL (ref 30–150)
TSH SERPL DL<=0.005 MIU/L-ACNC: 2.63 UIU/ML (ref 0.34–5.6)
URN SPEC COLLECT METH UR: ABNORMAL
UROBILINOGEN UR STRIP-ACNC: NEGATIVE EU/DL
WBC # BLD AUTO: 7.4 K/UL (ref 3.9–12.7)
WBC #/AREA URNS HPF: 11 /HPF (ref 0–5)
YEAST URNS QL MICRO: ABNORMAL

## 2024-06-18 PROCEDURE — 99291 CRITICAL CARE FIRST HOUR: CPT

## 2024-06-18 PROCEDURE — 96374 THER/PROPH/DIAG INJ IV PUSH: CPT

## 2024-06-18 PROCEDURE — 96361 HYDRATE IV INFUSION ADD-ON: CPT

## 2024-06-18 PROCEDURE — 81001 URINALYSIS AUTO W/SCOPE: CPT | Performed by: PHYSICIAN ASSISTANT

## 2024-06-18 PROCEDURE — 87502 INFLUENZA DNA AMP PROBE: CPT | Performed by: STUDENT IN AN ORGANIZED HEALTH CARE EDUCATION/TRAINING PROGRAM

## 2024-06-18 PROCEDURE — 25000003 PHARM REV CODE 250: Performed by: STUDENT IN AN ORGANIZED HEALTH CARE EDUCATION/TRAINING PROGRAM

## 2024-06-18 PROCEDURE — 84100 ASSAY OF PHOSPHORUS: CPT | Performed by: PHYSICIAN ASSISTANT

## 2024-06-18 PROCEDURE — 82803 BLOOD GASES ANY COMBINATION: CPT

## 2024-06-18 PROCEDURE — 93010 ELECTROCARDIOGRAM REPORT: CPT | Mod: ,,, | Performed by: GENERAL PRACTICE

## 2024-06-18 PROCEDURE — 99900035 HC TECH TIME PER 15 MIN (STAT)

## 2024-06-18 PROCEDURE — U0002 COVID-19 LAB TEST NON-CDC: HCPCS | Performed by: STUDENT IN AN ORGANIZED HEALTH CARE EDUCATION/TRAINING PROGRAM

## 2024-06-18 PROCEDURE — 21400001 HC TELEMETRY ROOM

## 2024-06-18 PROCEDURE — 63600175 PHARM REV CODE 636 W HCPCS: Performed by: STUDENT IN AN ORGANIZED HEALTH CARE EDUCATION/TRAINING PROGRAM

## 2024-06-18 PROCEDURE — 80061 LIPID PANEL: CPT | Performed by: HOSPITALIST

## 2024-06-18 PROCEDURE — 25500020 PHARM REV CODE 255: Performed by: EMERGENCY MEDICINE

## 2024-06-18 PROCEDURE — 93005 ELECTROCARDIOGRAM TRACING: CPT | Performed by: GENERAL PRACTICE

## 2024-06-18 PROCEDURE — 84443 ASSAY THYROID STIM HORMONE: CPT | Performed by: HOSPITALIST

## 2024-06-18 PROCEDURE — 82010 KETONE BODYS QUAN: CPT | Performed by: PHYSICIAN ASSISTANT

## 2024-06-18 PROCEDURE — 83735 ASSAY OF MAGNESIUM: CPT | Performed by: PHYSICIAN ASSISTANT

## 2024-06-18 PROCEDURE — 83036 HEMOGLOBIN GLYCOSYLATED A1C: CPT | Performed by: PHYSICIAN ASSISTANT

## 2024-06-18 PROCEDURE — 83690 ASSAY OF LIPASE: CPT | Performed by: PHYSICIAN ASSISTANT

## 2024-06-18 PROCEDURE — 87086 URINE CULTURE/COLONY COUNT: CPT | Performed by: PHYSICIAN ASSISTANT

## 2024-06-18 PROCEDURE — 36415 COLL VENOUS BLD VENIPUNCTURE: CPT | Performed by: PHYSICIAN ASSISTANT

## 2024-06-18 PROCEDURE — 83930 ASSAY OF BLOOD OSMOLALITY: CPT | Performed by: PHYSICIAN ASSISTANT

## 2024-06-18 PROCEDURE — 85025 COMPLETE CBC W/AUTO DIFF WBC: CPT | Performed by: PHYSICIAN ASSISTANT

## 2024-06-18 PROCEDURE — 80053 COMPREHEN METABOLIC PANEL: CPT | Performed by: PHYSICIAN ASSISTANT

## 2024-06-18 PROCEDURE — 82962 GLUCOSE BLOOD TEST: CPT

## 2024-06-18 RX ORDER — IBUPROFEN 200 MG
16 TABLET ORAL
Status: DISCONTINUED | OUTPATIENT
Start: 2024-06-19 | End: 2024-06-21 | Stop reason: HOSPADM

## 2024-06-18 RX ORDER — ATORVASTATIN CALCIUM 40 MG/1
80 TABLET, FILM COATED ORAL DAILY
Status: DISCONTINUED | OUTPATIENT
Start: 2024-06-19 | End: 2024-06-21 | Stop reason: HOSPADM

## 2024-06-18 RX ORDER — IBUPROFEN 800 MG/1
800 TABLET ORAL EVERY 6 HOURS
COMMUNITY
Start: 2024-04-05 | End: 2024-06-18

## 2024-06-18 RX ORDER — INSULIN ASPART 100 [IU]/ML
0-5 INJECTION, SOLUTION INTRAVENOUS; SUBCUTANEOUS
Status: DISCONTINUED | OUTPATIENT
Start: 2024-06-19 | End: 2024-06-21 | Stop reason: HOSPADM

## 2024-06-18 RX ORDER — AMITRIPTYLINE HYDROCHLORIDE 10 MG/1
10 TABLET, FILM COATED ORAL NIGHTLY
COMMUNITY
Start: 2024-05-23

## 2024-06-18 RX ORDER — ASPIRIN 325 MG
325 TABLET ORAL ONCE
Status: COMPLETED | OUTPATIENT
Start: 2024-06-18 | End: 2024-06-18

## 2024-06-18 RX ORDER — AMITRIPTYLINE HYDROCHLORIDE 10 MG/1
10 TABLET, FILM COATED ORAL NIGHTLY
Status: DISCONTINUED | OUTPATIENT
Start: 2024-06-19 | End: 2024-06-19

## 2024-06-18 RX ORDER — LORAZEPAM 1 MG/1
1 TABLET ORAL DAILY PRN
Status: DISCONTINUED | OUTPATIENT
Start: 2024-06-19 | End: 2024-06-21 | Stop reason: HOSPADM

## 2024-06-18 RX ORDER — GLUCAGON 1 MG
1 KIT INJECTION
Status: DISCONTINUED | OUTPATIENT
Start: 2024-06-19 | End: 2024-06-21 | Stop reason: HOSPADM

## 2024-06-18 RX ORDER — HEPARIN SODIUM 5000 [USP'U]/ML
5000 INJECTION, SOLUTION INTRAVENOUS; SUBCUTANEOUS EVERY 8 HOURS
Status: DISCONTINUED | OUTPATIENT
Start: 2024-06-19 | End: 2024-06-21 | Stop reason: HOSPADM

## 2024-06-18 RX ORDER — BISACODYL 10 MG/1
10 SUPPOSITORY RECTAL DAILY PRN
Status: DISCONTINUED | OUTPATIENT
Start: 2024-06-18 | End: 2024-06-21 | Stop reason: HOSPADM

## 2024-06-18 RX ORDER — ATORVASTATIN CALCIUM 40 MG/1
80 TABLET, FILM COATED ORAL ONCE
Status: COMPLETED | OUTPATIENT
Start: 2024-06-18 | End: 2024-06-18

## 2024-06-18 RX ORDER — DEXTROMETHORPHAN HYDROBROMIDE, GUAIFENESIN 5; 100 MG/5ML; MG/5ML
650 LIQUID ORAL EVERY 8 HOURS
Status: ON HOLD | COMMUNITY
End: 2024-06-21 | Stop reason: HOSPADM

## 2024-06-18 RX ORDER — ONDANSETRON HYDROCHLORIDE 2 MG/ML
4 INJECTION, SOLUTION INTRAVENOUS EVERY 12 HOURS PRN
Status: DISCONTINUED | OUTPATIENT
Start: 2024-06-18 | End: 2024-06-21 | Stop reason: HOSPADM

## 2024-06-18 RX ORDER — LORAZEPAM 2 MG/ML
1 INJECTION INTRAMUSCULAR ONCE
Status: COMPLETED | OUTPATIENT
Start: 2024-06-18 | End: 2024-06-18

## 2024-06-18 RX ORDER — ASPIRIN 81 MG/1
81 TABLET ORAL DAILY
Status: DISCONTINUED | OUTPATIENT
Start: 2024-06-19 | End: 2024-06-21 | Stop reason: HOSPADM

## 2024-06-18 RX ORDER — IBUPROFEN 200 MG
24 TABLET ORAL
Status: DISCONTINUED | OUTPATIENT
Start: 2024-06-19 | End: 2024-06-21 | Stop reason: HOSPADM

## 2024-06-18 RX ORDER — SODIUM CHLORIDE 0.9 % (FLUSH) 0.9 %
10 SYRINGE (ML) INJECTION
Status: DISCONTINUED | OUTPATIENT
Start: 2024-06-18 | End: 2024-06-21 | Stop reason: HOSPADM

## 2024-06-18 RX ORDER — LABETALOL HYDROCHLORIDE 5 MG/ML
10 INJECTION, SOLUTION INTRAVENOUS
Status: DISCONTINUED | OUTPATIENT
Start: 2024-06-18 | End: 2024-06-21 | Stop reason: HOSPADM

## 2024-06-18 RX ADMIN — ATORVASTATIN CALCIUM 80 MG: 40 TABLET, FILM COATED ORAL at 10:06

## 2024-06-18 RX ADMIN — IOHEXOL 100 ML: 350 INJECTION, SOLUTION INTRAVENOUS at 10:06

## 2024-06-18 RX ADMIN — ASPIRIN 325 MG ORAL TABLET 325 MG: 325 PILL ORAL at 10:06

## 2024-06-18 RX ADMIN — LORAZEPAM 1 MG: 2 INJECTION INTRAMUSCULAR; INTRAVENOUS at 08:06

## 2024-06-18 RX ADMIN — SODIUM CHLORIDE, POTASSIUM CHLORIDE, SODIUM LACTATE AND CALCIUM CHLORIDE 1000 ML: 600; 310; 30; 20 INJECTION, SOLUTION INTRAVENOUS at 08:06

## 2024-06-18 NOTE — ED PROVIDER NOTES
"Encounter Date: 6/18/2024       History     Chief Complaint   Patient presents with    Dizziness     X 3 days    Vomiting    Fatigue     HPI    54 year old F w/ PMH of DM, HLD, HTN w/ hx of gastric sleeve presents to the ED secondary to complaints of fatigue, vomiting, and dizziness episodes that have been occurring in the past month.     Upon interview of the patient, the patient reports that this is the second time she is in the ED due to the same reason. Patient reports that she would get "episodes" when she would suddenly get dizziness (full room spinning, severe vomiting > 12 episodes a day, with full weakness/inability to keep her body upright). This all occurred suddenly. Patient reports a history of poor controlled DM. She is only on metformin and not insulin. Patient's primary care provider is aware and says she needs to see an endocrinologist however the patient has never made an appointment. Patient reports her blood glucose has been in the 400s. And her A1c is 14. Patient is not on blood thinner. Patient reports no known sick contacts, fevers. Patient denies fevers or chills. Patient denies consumption of spoiled foods or raw food. Patient denies any recent chiropractic manipulation, recent car accidents, or falls.     Patient presented to outside hospital on 6/2/24 due to complaints of dizziness over the last week that has progressively gotten worse and.  She woke up the in the morning and thought she could not raise her arms bilaterally.  Hemoglobin at the time was 11.9. Glucose 336. Ua with glucosuria and LE.  EKG no STEMI.  CT head no intracranial hemorrhage or evidence of recent ischemia.  And prior was offered a time-out was patient refused.  Patient left against medical advice.  She was given meclizine and IV fluids and informed to follow up with Neurology.     Review of patient's allergies indicates:   Allergen Reactions    Penicillins Itching and Anaphylaxis    Sulfa (sulfonamide antibiotics) " Anaphylaxis    Meperidine Itching     Past Medical History:   Diagnosis Date    Anxiety     Diabetes mellitus     Elevated cholesterol     GERD (gastroesophageal reflux disease)     Hypertension     PUD (peptic ulcer disease)      Past Surgical History:   Procedure Laterality Date    GASTRIC BYPASS      HYSTERECTOMY       Family History   Problem Relation Name Age of Onset    Heart disease Mother 39     Hypertension Mother 39     Heart attack Mother 39     Stroke Brother 28     Cancer Maternal Aunt      Cancer Maternal Grandmother          lung    Heart disease Maternal Grandmother      Diabetes Paternal Grandmother      Heart disease Paternal Grandmother      Diabetes Paternal Grandfather       Social History     Tobacco Use    Smoking status: Never    Smokeless tobacco: Never   Substance Use Topics    Alcohol use: No    Drug use: No     Review of Systems   Constitutional:  Negative for chills and fever.   Eyes:  Negative for visual disturbance.   Respiratory:  Negative for cough, shortness of breath and wheezing.    Cardiovascular:  Negative for chest pain and palpitations.   Gastrointestinal:  Positive for nausea and vomiting. Negative for abdominal pain.   Genitourinary:  Positive for frequency.   Neurological:  Positive for dizziness and headaches.   Psychiatric/Behavioral:  Negative for agitation and confusion.        Physical Exam     Initial Vitals [06/18/24 1449]   BP Pulse Resp Temp SpO2   (!) 143/94 97 20 98.1 °F (36.7 °C) 97 %      MAP       --         Physical Exam    Nursing note and vitals reviewed.  Constitutional: She appears well-developed.   HENT:   Head: Normocephalic and atraumatic.   Eyes: EOM are normal.   Cardiovascular:  Normal rate.           Pulmonary/Chest: She exhibits no tenderness.   Abdominal: Abdomen is soft. There is no abdominal tenderness. There is no rebound and no guarding.   Musculoskeletal:         General: Normal range of motion.      Comments: Bilateral surface of the feet  with no evidence of foreign body or evidence of infection     Neurological: She is oriented to person, place, and time.   CN 2-12 intact  Able to perform cerebellar testing w/o dysmetria  Sensation intact in bilateral upper and lower extremity  No pronator drift  Patient able to raise bilateral upper extremities for greater than 10 seconds  Patient able to raise bilateral lower extremity for greater than 5 minutes.  Alert oriented to person place and time  Able to recognize common object  No slurring of speech or dysarthria     Skin: Skin is warm.   Psychiatric: She has a normal mood and affect.         ED Course   Procedures  Labs Reviewed   COMPREHENSIVE METABOLIC PANEL - Abnormal; Notable for the following components:       Result Value    Sodium 134 (*)     Glucose 264 (*)     All other components within normal limits   URINALYSIS, REFLEX TO URINE CULTURE - Abnormal; Notable for the following components:    Protein, UA 1+ (*)     Glucose, UA 4+ (*)     All other components within normal limits    Narrative:     Specimen Source->Urine   URINALYSIS MICROSCOPIC - Abnormal; Notable for the following components:    RBC, UA 14 (*)     WBC, UA 11 (*)     All other components within normal limits    Narrative:     Specimen Source->Urine   OSMOLALITY, SERUM - Abnormal; Notable for the following components:    Osmolality 301 (*)     All other components within normal limits   LIPID PANEL - Abnormal; Notable for the following components:    Triglycerides 155 (*)     LDL Cholesterol 55.0 (*)     All other components within normal limits    Narrative:     Fasting   ISTAT PROCEDURE - Abnormal; Notable for the following components:    POC PO2 26 (*)     All other components within normal limits   POCT GLUCOSE - Abnormal; Notable for the following components:    POC Glucose 362 (*)     All other components within normal limits   CULTURE, URINE   CBC W/ AUTO DIFFERENTIAL   OSMOLALITY, SERUM   BETA - HYDROXYBUTYRATE, SERUM    HEMOGLOBIN A1C   BETA - HYDROXYBUTYRATE, SERUM   INFLUENZA A AND B ANTIGEN    Narrative:     Specimen Source->Nasopharyngeal Swab   SARS-COV-2 RNA AMPLIFICATION, QUAL   MAGNESIUM   PHOSPHORUS   MAGNESIUM   PHOSPHORUS   LIPASE   LIPASE   TSH    Narrative:     Fasting   HEMOGLOBIN A1C   COMPREHENSIVE METABOLIC PANEL   MAGNESIUM   PHOSPHORUS   CBC W/ AUTO DIFFERENTIAL   APTT   PROTIME-INR   POCT GLUCOSE MONITORING CONTINUOUS   POCT GLUCOSE MONITORING CONTINUOUS        ECG Results              EKG 12-lead (Final result)        Collection Time Result Time QRS Duration OHS QTC Calculation    06/18/24 16:22:36 06/18/24 22:11:52 80 445                     Final result by Interface, Lab In [facility] (06/18/24 22:11:57)                   Narrative:    Test Reason : R42,    Vent. Rate : 090 BPM     Atrial Rate : 090 BPM     P-R Int : 146 ms          QRS Dur : 080 ms      QT Int : 364 ms       P-R-T Axes : 049 020 015 degrees     QTc Int : 445 ms    Normal sinus rhythm  Normal ECG  When compared with ECG of 02-JUN-2024 10:40,  No significant change was found  Confirmed by Rachel SANTANA, Jayant NEWTON ([id number]) on 6/18/2024 10:11:50 PM    Referred By: [email or web address]   SELF           Confirmed By:Jayant Ramos MD                                  Imaging Results              CTA Head and Neck (xpd) (Final result)  Result time 06/18/24 23:54:33      Final result by Myesha Linn MD (06/18/24 23:54:33)                   Impression:      No appreciable acute abnormality on CTA.    No high-grade stenosis or major vessel occlusion.      Electronically signed by: Myesha Linn  Date:    06/18/2024  Time:    23:54               Narrative:    EXAMINATION:  CTA HEAD AND NECK (XPD)    CLINICAL HISTORY:  stroke;    TECHNIQUE:  Non contrast low dose axial images were obtained through the head. CT angiogram was performed from the level of the hattie to the top of the head following the IV administration of 100mL of Omnipaque 350.   Sagittal and  coronal reconstructions and maximum intensity projection reconstructions were performed. Arterial stenosis percentages are based on NASCET measurement criteria.    COMPARISON:  MRA and MRI brain 06/18/2024    FINDINGS:  Intracranial Compartment:    Limited imaging demonstrates ventricles and sulci are normal in size for age without evidence of hydrocephalus. No appreciable is extra-axial blood or fluid collections.    The brain parenchyma appears normal. No parenchymal mass, hemorrhage, edema, or major vascular distribution infarct.  Known cerebellar infarcts are not visualized.    Skull/Extracranial Contents (limited evaluation): No fracture. Mastoid air cells and paranasal sinuses are essentially clear.    Non-Vascular Structures of the Neck/Thoracic Inlet (limited evaluation): Normal.    Aorta: Normal 3 vessel arch.    Extracranial carotid circulation: No hemodynamically significant stenosis, aneurysmal dilatation, or dissection.  40% stenosis at the origin of the right internal carotid artery secondary to atherosclerotic calcification.    Extracranial vertebral circulation: No hemodynamically significant stenosis, aneurysmal dilatation, or dissection.    Intracranial Arteries: No focal high-grade stenosis, occlusion, or aneurysm.    Venous structures (limited evaluation): Normal.                                       MRA Brain without contrast (Final result)  Result time 06/18/24 21:59:25      Final result by Halley Mckeon MD (06/18/24 21:59:25)                   Impression:      No large vessel occlusion.      Electronically signed by: Halley Mckeon  Date:    06/18/2024  Time:    21:59               Narrative:    EXAMINATION:  MRA BRAIN WITHOUT CONTRAST    CLINICAL HISTORY:  concern for stroke; .    TECHNIQUE:  Non-contrast 3-D time-of-flight intracranial MR angiography was performed through the [employer] [place] with MIP reformatting.    COMPARISON:  None.    FINDINGS:  Anterior intracranial  circulation: No high-grade focal stenosis, occlusion, aneurysm, or vascular malformation.    Posterior intracranial circulation: No high-grade focal stenosis, occlusion, aneurysm, or vascular malformation.                                       MRI Brain Without Contrast (Final result)  Result time 06/18/24 21:50:15      Final result by Halley Mckeon MD (06/18/24 21:50:15)                   Impression:      Acute bilateral cerebellar infarcts.    At least 20 T2/FLAIR hyperintense foci in the periventricular and deep white matter including the right side of the midbrain and joaquin.  Considerations include chronic microvascular ischemia, demyelinating disease, or migraine headaches.    COMMUNICATION  The critical information above was relayed directly by Halley Mckeon by Epic secure chat (with acknowledgement) to JOI Barone MD and KEVIN Joseph MD on 6/18/2024 at 930 PM      Electronically signed by: Halley Mckeon  Date:    06/18/2024  Time:    21:50               Narrative:    EXAMINATION:  MRI BRAIN WITHOUT CONTRAST    CLINICAL HISTORY:  concern for stroke;.    TECHNIQUE:  Multiplanar multisequence MR imaging of the brain was performed without contrast.    COMPARISON:  None    FINDINGS:  Intracranial compartment:    Ventricles and sulci are normal in size for age without evidence of hydrocephalus. No extra-axial blood or fluid collections.    Acute infarcts throughout the bilateral cerebellar hemispheres, right greater than left.  No mass lesion or acute hemorrhage.  At least 20 T2/FLAIR hyperintense foci in the periventricular and deep white matter including the right side of the midbrain and joaquin.    Normal vascular flow voids are preserved.    Skull/extracranial contents (limited evaluation): Bone marrow signal intensity is normal.                                       X-Ray Chest PA And Lateral (Final result)  Result time 06/18/24 20:46:25      Final result by Myesha Linn MD (06/18/24  20:46:25)                   Impression:    [  No acute abnormality.      Electronically signed by: Myesha Linn  Date:    06/18/2024  Time:    20:46               Narrative:    EXAMINATION:  XR CHEST PA AND LATERAL    CLINICAL HISTORY:  Nausea with vomiting, unspecified    TECHNIQUE:  PA and lateral views of the chest were performed.    COMPARISON:  12/30/2019, 08/14/2013 chest x-ray    FINDINGS:  Cardiac silhouette is not enlarged.  Lungs appear clear.  There is no pleural effusion or pneumothorax.  Osseous structures are intact.                                       Medications   sodium chloride 0.9% flush 10 mL (has no administration in time range)   sodium chloride 0.9% bolus 500 mL 500 mL (has no administration in time range)   labetaloL injection 10 mg (has no administration in time range)   bisacodyL suppository 10 mg (has no administration in time range)   aspirin EC tablet 81 mg (has no administration in time range)   atorvastatin tablet 80 mg (has no administration in time range)   ondansetron injection 4 mg (has no administration in time range)   heparin (porcine) injection 5,000 Units (has no administration in time range)   amitriptyline tablet 10 mg (has no administration in time range)   LORazepam tablet 1 mg (has no administration in time range)   glucose chewable tablet 16 g (has no administration in time range)   glucose chewable tablet 24 g (has no administration in time range)   dextrose 50% injection 12.5 g (has no administration in time range)   dextrose 50% injection 25 g (has no administration in time range)   glucagon (human recombinant) injection 1 mg (has no administration in time range)   insulin aspart U-100 pen 0-5 Units (has no administration in time range)   lactated ringers bolus 1,000 mL (0 mLs Intravenous Stopped 6/18/24 2123)   LORazepam injection 1 mg ( Intravenous Canceled Entry 6/18/24 2145)   atorvastatin tablet 80 mg (80 mg Oral Given 6/18/24 2211)   aspirin tablet 325 mg  (325 mg Oral Given 6/18/24 2211)   iohexoL (OMNIPAQUE 350) injection 100 mL (100 mLs Intravenous Given 6/18/24 2224)     Medical Decision Making  Amount and/or Complexity of Data Reviewed  Labs: ordered.  Radiology: ordered. Decision-making details documented in ED Course.    Risk  OTC drugs.  Prescription drug management.  Decision regarding hospitalization.              Attending Attestation:             Attending ED Notes:   SUPERVISING PHYSICIAN STATEMENT:      I have personally interviewed and examined the patient with substantiative portion of care provided by me as well.  All charts, labs, and imaging studies were reviewed.  I agree with the resident's findings, exam, and plan.    Patient reported controlled diabetes presents to ED with episodic dizzy spells described as vertiginous with room spinning associated with nausea/vomiting and feeling off balance.  Hemodynamically stable here.  Had a recent visit to [facility] ED where she refused MRI.  She returns tonight because episodes continue to happen and she is taking meclizine daily without significant improvement.  Labs significant for hyperglycemia without evidence of DKA.  She has no appreciable focal deficit on my exam.  Patient is amenable to MRI brain tonight which showed evidence of acute bilateral cerebellar infarcts.  MRA negative.  Neurology consulted, recommend CTA head/neck now which is ordered and pending.  Hospitalist will admit for further management and workup.  Patient is outside of [facility] window.    Attending Critical Care:   Critical Care Times:   Direct Patient Care (initial evaluation, reassessments, and time considering the case)................................................................10 minutes.   Additional History from reviewing old medical records or taking additional history from the family, EMS, PCP, etc.......................10 minutes.   Ordering, Reviewing, and Interpreting Diagnostic  Studies...............................................................................................................10 minutes.   Documentation..................................................................................................................................................................................5 minutes.   ==============================================================  · Total Critical Care Time - exclusive of procedural time: 35 minutes.  ==============================================================  Critical care was necessary to treat or prevent imminent or life-threatening deterioration of the following conditions: acute stroke.   Critical care was time spent personally by me on the following activities: obtaining history from patient or relative, examination of patient, review of x-rays / CT sent with the patient, ordering lab, x-rays, and/or EKG, development of treatment plan with patient or relative, ordering and performing treatments and interventions, interpretation of cardiac measurements and re-evaluation of patient's conition.   Critical Care Condition: potentially life-threatening                   ED Course as of 24 0306   e 2024   1636 EK bpm, NAD, NSR, no pr prolongation, QRS widening, or  Qtc prolongation. No acute ischemic findings.  [JN]    X-Ray Chest PA And Lateral  No acute abnormality. [JN]    Radiology called and said they see possible infarct on the MRI, would we want any more imaging. MRA w/o contrast added.  [JN]    Neurologist on call recommends CTA H&N, atorvastatin 80 mg,  and permissive HTN up to 220/110 [JN]    MRI Brain Without Contrast [JA]      ED Course User Index  [JA] Kiara Barone MD  [JN] Ron Joseph MD               54 year old F w/ PMH of DM, HLD, HTN w/ hx of gastric sleeve presents to the ED secondary to complaints of fatigue, vomiting, and dizziness episodes that have been occurring in the past  "month.     Upon interview of the patient, the patient reports that this is the second time she is in the ED due to the same reason. Patient reports that she would get "episodes" when she would suddenly get dizziness (full room spinning, severe vomiting > 12 episodes a day, with full weakness/inability to keep her body upright). This all occurred suddenly. Patient reports a history of poor controlled DM. She is only on metformin and not insulin. Patient's primary care provider is aware and says she needs to see an endocrinologist however the patient has never made an appointment. Patient reports her blood glucose has been in the 400s. And her A1c is 14. Patient is not on blood thinner. Patient reports no known sick contacts, fevers. Patient denies fevers or chills. Patient denies consumption of spoiled foods or raw food.     Patient presented to outside hospital on 6/2/24 due to complaints of dizziness over the last week that has progressively gotten worse and.  She woke up the in the morning and thought she could not raise her arms bilaterally.  Hemoglobin at the time was 11.9. Glucose 336. Ua with glucosuria and LE.  EKG no STEMI.  CT head no intracranial hemorrhage or evidence of recent ischemia.  And prior was offered a time-out was patient refused.  Patient left against medical advice.  She was given meclizine and IV fluids and informed to follow up with Neurology. Patient denies any recent chiropractic manipulation, recent car accidents, or falls.       Vitals notable for: afebrile and overall HDS  Physical exam notable for: as above    Ddx include but not limited to: hyperglycemia, DKA, dehydration, electrolyte abnormality, ACS/MI, boerhaave, metabolic derangement, Cerebellar stroke/bleed. Covid/flu, pancreatitis,     Basic labs ordered including but not limited to: DKA labs, electrolytes, EKG, CXR, lipase, POCT glucose, Hgb A1c. 1L of fluids and MRI w/o contrast ordered. Patient request medication for " anxiety. 1mg ativan ordered for MRI.     Case discussed with Dr.Alleyn Ron Joseph  Emergency medicine PGY3    Work up notable for:   Covid negative  Flu negative  No acidosis.   No leukocytosis. Hgb at 12.5 plt wnl  Mild hyponatermia at 134, no acidosis. No AG  Glucose elevated at 264  No elevated liver enzymes  Urinalysis: proteinuria, and glucosuria  Umicro: 14 RBC and 11 WBC    Lipase wnl  Hgb A1c pending  BHB wnl  Serum  osm: 301 elevated likely from hyperglycemia    EK bpm, NAD, NSR, no pr prolongation, QRS widening, or  Qtc prolongation. No acute ischemic findings.     CXR:No acute abnormality.     MRI B:Acute bilateral cerebellar infarcts.     MRA brain:No large vessel occlusion.     Given findings of acute bilateral cerebellar infarcts, neurology consulted and recommends CTA H&N as well. Also recommends atorvastatin 80,  mg and permissive HTN to 220/110. Patient and her family updated on labs and imaging. Patient consulted for admission and has been admitted.    Ron Joseph  Emergency medicine PGY3                Clinical Impression:  Final diagnoses:  [R42] Dizziness  [R11.2] Nausea and vomiting  [R11.2] Nausea & vomiting  [I63.9] Cerebellar stroke, acute (Primary)          ED Disposition Condition    Admit Stable                Ron Joseph MD  Resident  [id number]       Kiara Barone MD  [id number]

## 2024-06-18 NOTE — FIRST PROVIDER EVALUATION
Emergency Department TeleTriage Encounter Note      CHIEF COMPLAINT    Chief Complaint   Patient presents with    Dizziness     X 3 days    Vomiting    Fatigue       VITAL SIGNS   Initial Vitals [06/18/24 1449]   BP Pulse Resp Temp SpO2   (!) 143/94 97 20 98.1 °F (36.7 °C) 97 %      MAP       --            ALLERGIES    Review of patient's allergies indicates:   Allergen Reactions    Penicillins Itching and Anaphylaxis    Sulfa (sulfonamide antibiotics) Anaphylaxis    Meperidine Itching       PROVIDER TRIAGE NOTE  Patient presents with complaint of multiple things including fatigue, vomiting, dizziness, weakness.  She was seen on 06/02 with similar symptoms and had a CT of her head at that time.  She reports symptoms have persisted.      Phy:   Constitutional: well nourished, well developed, appearing stated age, NAD        Initial orders will be placed and care will be transferred to an alternate provider when patient is roomed for a full evaluation. Any additional orders and the final disposition will be determined by that provider.        ORDERS  Labs Reviewed - No data to display    ED Orders (720h ago, onward)      None              Virtual Visit Note: The provider triage portion of this emergency department evaluation and documentation was performed via [employer], a HIPAA-compliant telemedicine application, in concert with a tele-presenter in the room. A face to face patient evaluation with one of my colleagues will occur once the patient is placed in an emergency department room.      DISCLAIMER: This note was prepared with [employer] voice recognition transcription software. Garbled syntax, mangled pronouns, and other bizarre constructions may be attributed to that software system.

## 2024-06-19 ENCOUNTER — CLINICAL SUPPORT (OUTPATIENT)
Dept: CARDIOLOGY | Facility: HOSPITAL | Age: 54
End: 2024-06-19
Attending: HOSPITALIST
Payer: COMMERCIAL

## 2024-06-19 VITALS — HEIGHT: 65 IN | WEIGHT: 178 LBS | BODY MASS INDEX: 29.66 KG/M2

## 2024-06-19 LAB
ALBUMIN SERPL BCP-MCNC: 3.5 G/DL (ref 3.5–5.2)
ALP SERPL-CCNC: 68 U/L (ref 55–135)
ALT SERPL W/O P-5'-P-CCNC: 11 U/L (ref 10–44)
ANION GAP SERPL CALC-SCNC: 10 MMOL/L (ref 8–16)
APTT PPP: <21 SEC (ref 21–32)
AST SERPL-CCNC: 14 U/L (ref 10–40)
BILIRUB SERPL-MCNC: 0.5 MG/DL (ref 0.1–1)
BUN SERPL-MCNC: 17 MG/DL (ref 6–20)
CALCIUM SERPL-MCNC: 8.8 MG/DL (ref 8.7–10.5)
CHLORIDE SERPL-SCNC: 104 MMOL/L (ref 95–110)
CO2 SERPL-SCNC: 21 MMOL/L (ref 23–29)
CREAT SERPL-MCNC: 0.9 MG/DL (ref 0.5–1.4)
EST. GFR  (NO RACE VARIABLE): >60 ML/MIN/1.73 M^2
ESTIMATED AVG GLUCOSE: 378 MG/DL (ref 68–131)
GLUCOSE SERPL-MCNC: 236 MG/DL (ref 70–110)
GLUCOSE SERPL-MCNC: 244 MG/DL (ref 70–110)
GLUCOSE SERPL-MCNC: 249 MG/DL (ref 70–110)
GLUCOSE SERPL-MCNC: 253 MG/DL (ref 70–110)
GLUCOSE SERPL-MCNC: 347 MG/DL (ref 70–110)
GLUCOSE SERPL-MCNC: 371 MG/DL (ref 70–110)
GLUCOSE SERPL-MCNC: 449 MG/DL (ref 70–110)
HBA1C MFR BLD: 14.8 % (ref 4.5–6.2)
INR PPP: 0.9 (ref 0.8–1.2)
MAGNESIUM SERPL-MCNC: 1.7 MG/DL (ref 1.6–2.6)
PHOSPHATE SERPL-MCNC: 3.4 MG/DL (ref 2.7–4.5)
POTASSIUM SERPL-SCNC: 3.8 MMOL/L (ref 3.5–5.1)
PROT SERPL-MCNC: 6.1 G/DL (ref 6–8.4)
PROTHROMBIN TIME: 9.8 SEC (ref 9–12.5)
SODIUM SERPL-SCNC: 135 MMOL/L (ref 136–145)

## 2024-06-19 PROCEDURE — 92610 EVALUATE SWALLOWING FUNCTION: CPT

## 2024-06-19 PROCEDURE — 85300 ANTITHROMBIN III ACTIVITY: CPT | Performed by: STUDENT IN AN ORGANIZED HEALTH CARE EDUCATION/TRAINING PROGRAM

## 2024-06-19 PROCEDURE — 83735 ASSAY OF MAGNESIUM: CPT | Performed by: HOSPITALIST

## 2024-06-19 PROCEDURE — 86146 BETA-2 GLYCOPROTEIN ANTIBODY: CPT | Performed by: STUDENT IN AN ORGANIZED HEALTH CARE EDUCATION/TRAINING PROGRAM

## 2024-06-19 PROCEDURE — 36415 COLL VENOUS BLD VENIPUNCTURE: CPT | Performed by: STUDENT IN AN ORGANIZED HEALTH CARE EDUCATION/TRAINING PROGRAM

## 2024-06-19 PROCEDURE — 63600175 PHARM REV CODE 636 W HCPCS: Performed by: HOSPITALIST

## 2024-06-19 PROCEDURE — 82962 GLUCOSE BLOOD TEST: CPT

## 2024-06-19 PROCEDURE — 97535 SELF CARE MNGMENT TRAINING: CPT

## 2024-06-19 PROCEDURE — 21400001 HC TELEMETRY ROOM

## 2024-06-19 PROCEDURE — 85730 THROMBOPLASTIN TIME PARTIAL: CPT | Performed by: HOSPITALIST

## 2024-06-19 PROCEDURE — 85610 PROTHROMBIN TIME: CPT | Performed by: HOSPITALIST

## 2024-06-19 PROCEDURE — 83090 ASSAY OF HOMOCYSTEINE: CPT | Performed by: STUDENT IN AN ORGANIZED HEALTH CARE EDUCATION/TRAINING PROGRAM

## 2024-06-19 PROCEDURE — 84100 ASSAY OF PHOSPHORUS: CPT | Performed by: HOSPITALIST

## 2024-06-19 PROCEDURE — 30000890 LABCORP MISCELLANEOUS TEST: Performed by: STUDENT IN AN ORGANIZED HEALTH CARE EDUCATION/TRAINING PROGRAM

## 2024-06-19 PROCEDURE — 25000003 PHARM REV CODE 250: Performed by: HOSPITALIST

## 2024-06-19 PROCEDURE — 83695 ASSAY OF LIPOPROTEIN(A): CPT | Performed by: STUDENT IN AN ORGANIZED HEALTH CARE EDUCATION/TRAINING PROGRAM

## 2024-06-19 PROCEDURE — 85613 RUSSELL VIPER VENOM DILUTED: CPT | Performed by: STUDENT IN AN ORGANIZED HEALTH CARE EDUCATION/TRAINING PROGRAM

## 2024-06-19 PROCEDURE — 85306 CLOT INHIBIT PROT S FREE: CPT | Performed by: STUDENT IN AN ORGANIZED HEALTH CARE EDUCATION/TRAINING PROGRAM

## 2024-06-19 PROCEDURE — 85240 CLOT FACTOR VIII AHG 1 STAGE: CPT | Performed by: STUDENT IN AN ORGANIZED HEALTH CARE EDUCATION/TRAINING PROGRAM

## 2024-06-19 PROCEDURE — 80053 COMPREHEN METABOLIC PANEL: CPT | Performed by: HOSPITALIST

## 2024-06-19 PROCEDURE — 86147 CARDIOLIPIN ANTIBODY EA IG: CPT | Performed by: STUDENT IN AN ORGANIZED HEALTH CARE EDUCATION/TRAINING PROGRAM

## 2024-06-19 PROCEDURE — 93306 TTE W/DOPPLER COMPLETE: CPT

## 2024-06-19 PROCEDURE — 85303 CLOT INHIBIT PROT C ACTIVITY: CPT | Performed by: STUDENT IN AN ORGANIZED HEALTH CARE EDUCATION/TRAINING PROGRAM

## 2024-06-19 PROCEDURE — 93306 TTE W/DOPPLER COMPLETE: CPT | Mod: 26,,, | Performed by: INTERNAL MEDICINE

## 2024-06-19 PROCEDURE — 86148 ANTI-PHOSPHOLIPID ANTIBODY: CPT | Mod: 59 | Performed by: STUDENT IN AN ORGANIZED HEALTH CARE EDUCATION/TRAINING PROGRAM

## 2024-06-19 PROCEDURE — 97165 OT EVAL LOW COMPLEX 30 MIN: CPT

## 2024-06-19 RX ORDER — INSULIN GLARGINE 100 [IU]/ML
16 INJECTION, SOLUTION SUBCUTANEOUS DAILY
Status: DISCONTINUED | OUTPATIENT
Start: 2024-06-20 | End: 2024-06-20

## 2024-06-19 RX ADMIN — HEPARIN SODIUM 5000 UNITS: 5000 INJECTION INTRAVENOUS; SUBCUTANEOUS at 03:06

## 2024-06-19 RX ADMIN — ASPIRIN 81 MG: 81 TABLET, COATED ORAL at 08:06

## 2024-06-19 RX ADMIN — INSULIN ASPART 3 UNITS: 100 INJECTION, SOLUTION INTRAVENOUS; SUBCUTANEOUS at 09:06

## 2024-06-19 RX ADMIN — HEPARIN SODIUM 5000 UNITS: 5000 INJECTION INTRAVENOUS; SUBCUTANEOUS at 05:06

## 2024-06-19 RX ADMIN — INSULIN ASPART 2 UNITS: 100 INJECTION, SOLUTION INTRAVENOUS; SUBCUTANEOUS at 06:06

## 2024-06-19 RX ADMIN — ATORVASTATIN CALCIUM 80 MG: 40 TABLET, FILM COATED ORAL at 09:06

## 2024-06-19 RX ADMIN — HEPARIN SODIUM 5000 UNITS: 5000 INJECTION INTRAVENOUS; SUBCUTANEOUS at 09:06

## 2024-06-19 RX ADMIN — INSULIN ASPART 3 UNITS: 100 INJECTION, SOLUTION INTRAVENOUS; SUBCUTANEOUS at 02:06

## 2024-06-19 NOTE — PT/OT/SLP EVAL
Occupational Therapy   Evaluation    Name: Meg Lockhart  MRN: [id number]  Admitting Diagnosis: Acute ischemic stroke  Recent Surgery: * No surgery found *      Recommendations:     Discharge Recommendations: No Therapy Indicated  Discharge Equipment Recommendations:  none  Barriers to discharge:   (Increased assistance with ADLs)    Assessment:     Meg Lockhart is a 54 y.o. female with a medical diagnosis of Acute ischemic stroke.  Performance deficits affecting function: impaired endurance, weakness, impaired self care skills, impaired functional mobility, impaired cardiopulmonary response to activity.      Rehab Prognosis: Good; patient would benefit from acute skilled OT services to address these deficits and reach maximum level of function.       Plan:     Patient to be seen 3 x/week to address the above listed problems via self-care/home management, therapeutic activities, therapeutic exercises  Plan of Care Expires: 07/19/24  Plan of Care Reviewed with: patient    Subjective     Chief Complaint: I have been so nauseas from vertigo.  She reports having some memory issues recently.  She reports common mis speaking recently.  Patient/Family Comments/goals: She reports having very low walking endurance and standing tolerance.    Occupational Profile:  Living Environment: She lives with her  and 3 of her 4 children.  She has a 2 story home and she lives on the 2nd story.  There are 13 steps to the second story hand rail on one side.   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  Roles and Routines: Wife, mother, active lifestyle.  Equipment Used at Home: none  Assistance upon Discharge:  and grown kids.    Pain/Comfort:  Pain Rating 1: 0/10    Patients cultural, spiritual, Nondenominational conflicts given the current situation: no    Objective:     Communicated with: nurse prior to session.  Patient found HOB elevated with peripheral IV, pulse ox (continuous), blood  pressure cuff, telemetry upon OT entry to room.    General Precautions: Standard, seizure  Orthopedic Precautions: N/A  Braces: N/A  Respiratory Status: Room air    Occupational Performance:    Bed Mobility:    Patient completed Rolling/Turning to Right with supervision  Patient completed Supine to Sit with supervision  Patient completed Sit to Supine with supervision    Functional Mobility/Transfers:  Patient completed Sit <> Stand Transfer with supervision  with  no assistive device   Functional Mobility: She ambulated in the gamez to go to the bathroom with Supervision no assistive devise but fatigued quickly.    Activities of Daily Living:  Grooming: stand by assistance for oral care and face washing standing at the sink.    Cognitive/Visual Perceptual:  Cognitive/Psychosocial Skills:     -       Oriented to: Person, Place, Time, and Situation   -       Follows Commands/attention:Follows two-step commands  -       Communication: clear/fluent  -       Memory: No Deficits noted  -       Safety awareness/insight to disability: intact   -       Mood/Affect/Coping skills/emotional control: Appropriate to situation    Physical Exam:  Balance:    -       static sitting- good, static standing- good  Dominant hand:    -       right  Upper Extremity Range of Motion:     -       Right Upper Extremity: WNL  -       Left Upper Extremity: WNL  Upper Extremity Strength:    -       Right Upper Extremity: WNL  -       Left Upper Extremity: WNL   Strength:    -       Right Upper Extremity: WNL  -       Left Upper Extremity: WNL  Fine Motor Coordination:    -       Intact  Gross motor coordination:   WFL    AMPAC 6 Click ADL:  AMPAC Total Score: 21    Treatment & Education:  She was educated on Role of Occupational Therapy, goals and plan of care.  Discussed importance of OOB activity and functional mobility to negate the negative effects of prolonged bedrest during this hospitalization, safe transfers and d/c planning  recommendations. She ambulated without an assistive devise down the gamez to the bathroom and performed grooming and hygiene.    Patient left supine with all lines intact, call button in reach, and nurse notified    GOALS:   Multidisciplinary Problems       Occupational Therapy Goals          Problem: Occupational Therapy    Goal Priority Disciplines Outcome Interventions   Occupational Therapy Goal     OT, PT/OT     Description: Goals to be met by: 07/19/2024     Patient will increase functional independence with ADLs by performing:    Grooming while standing with [place].  Toileting from toilet with [place] for hygiene and clothing management.   Toilet transfer to bedside commode with [place].                         History:     Past Medical History:   Diagnosis Date    Anxiety     Diabetes mellitus     Elevated cholesterol     GERD (gastroesophageal reflux disease)     Hypertension     PUD (peptic ulcer disease)          Past Surgical History:   Procedure Laterality Date    GASTRIC BYPASS      HYSTERECTOMY         Time Tracking:     OT Date of Treatment: 06/19/24  OT Start Time: 1000  OT Stop Time: 1039  OT Total Time (min): 39 min    Billable Minutes:Evaluation 15  Self Care/Home Management 24    6/19/2024

## 2024-06-19 NOTE — H&P
[facility] - Emergency Dept  Hospital Medicine  History & Physical    Patient Name: Meg Lockhart  MRN: [id number]  Patient Class: IP- Inpatient  Admission Date: 6/18/2024  Attending Physician: Yusuf Galeana MD  Primary Care Provider: Drew Campuzano MD         Patient information was obtained from patient and ER records.     Subjective:     Principal Problem:Acute ischemic stroke    Chief Complaint:   Chief Complaint   Patient presents with    Dizziness     X 3 days    Vomiting    Fatigue        HPI: This is a 54-year-old female who has been having vertigo and unsteady gait starting June 2, 2024.  She was seen at a hospital and they were concerned that she may have a stroke but the patient left without getting an MRI.  Her symptoms include vertigo and loss of balance in the lower extremity.  She also says that she feels burning sensation in the lower extremity.  She has a history of hypertension and diabetes.  There are significant history of stroke in her family.  A stroke alert was ordered and an MRI was followed showing an acute bilateral cerebellar infarct.  MRA was also performed which did not show significant occlusion.  Neurology was consulted and recommended inpatient admission.  Patient otherwise awake alert and oriented.  No nausea or vomiting.  Complained of bilateral lower extremity weakness.    No new subjective & objective note has been filed under this hospital service since the last note was generated.    Assessment/Plan:     * Acute ischemic stroke  Admit using the stroke protocol  Permissive hypertension   Aspirin 81 mg daily   Atorvastatin 80 mg daily   PT/OT/SLP  Echocardiogram   Neurology consulted  Serial neuro check      Hypertension  Hold oral medication   Permissive hypertension      Diabetes mellitus without complication  Hold oral medication   Insulin sliding scale   We will need diabetic education      VTE Risk Mitigation (From admission, onward)           Ordered      heparin (porcine) injection 5,000 Units  Every 8 hours         06/18/24 2236     IP VTE HIGH RISK PATIENT  Once         06/18/24 2236     Place sequential compression device  Until discontinued         06/18/24 2236                                    Yusuf Galeana MD  Department of Hospital Medicine  [facility] - Emergency Dept

## 2024-06-19 NOTE — HPI
This is a 54-year-old female who has been having vertigo and unsteady gait starting June 2, 2024.  She was seen at a hospital and they were concerned that she may have a stroke but the patient left without getting an MRI.  Her symptoms include vertigo and loss of balance in the lower extremity.  She also says that she feels burning sensation in the lower extremity.  She has a history of hypertension and diabetes.  There are significant history of stroke in her family.  A stroke alert was ordered and an MRI was followed showing an acute bilateral cerebellar infarct.  MRA was also performed which did not show significant occlusion.  Neurology was consulted and recommended inpatient admission.  Patient otherwise awake alert and oriented.  No nausea or vomiting.  Complained of bilateral lower extremity weakness.

## 2024-06-19 NOTE — H&P
[facility] - Emergency Dept  Hospital Medicine  History & Physical    Patient Name: Meg Lockhart  MRN: [id number]  Patient Class: IP- Inpatient  Admission Date: 6/18/2024  Attending Physician: Yusuf Galeana MD  Primary Care Provider: Drew Campuzano MD         Patient information was obtained from patient and ER records.     Subjective:     Principal Problem:Acute ischemic stroke    Chief Complaint:   Chief Complaint   Patient presents with    Dizziness     X 3 days    Vomiting    Fatigue        HPI: This is a 54-year-old female who has been having vertigo and unsteady gait starting June 2, 2024.  She was seen at a hospital and they were concerned that she may have a stroke but the patient left without getting an MRI.  Her symptoms include vertigo and loss of balance in the lower extremity.  She also says that she feels burning sensation in the lower extremity.  She has a history of hypertension and diabetes.  There are significant history of stroke in her family.  A stroke alert was ordered and an MRI was followed showing an acute bilateral cerebellar infarct.  MRA was also performed which did not show significant occlusion.  Neurology was consulted and recommended inpatient admission.  Patient otherwise awake alert and oriented.  No nausea or vomiting.  Complained of bilateral lower extremity weakness.    Past Medical History:   Diagnosis Date    Anxiety     Diabetes mellitus     Elevated cholesterol     GERD (gastroesophageal reflux disease)     Hypertension     PUD (peptic ulcer disease)        Past Surgical History:   Procedure Laterality Date    GASTRIC BYPASS      HYSTERECTOMY         Review of patient's allergies indicates:   Allergen Reactions    Penicillins Itching and Anaphylaxis    Sulfa (sulfonamide antibiotics) Anaphylaxis    Meperidine Itching       No current facility-administered medications on file prior to encounter.     Current Outpatient Medications on File Prior to  Encounter   Medication Sig    acetaminophen (TYLENOL) 650 MG TbSR Take 650 mg by mouth every 8 (eight) hours.    amitriptyline (ELAVIL) 10 MG tablet Take 10 mg by mouth every evening.    amLODIPine (NORVASC) 10 MG tablet Take 0.5 tablets (5 mg total) by mouth once daily.    chlorthalidone (HYGROTEN) 25 MG Tab Take 1 tablet (25 mg total) by mouth once daily.    famotidine (PEPCID) 40 MG tablet Take 1 tablet (40 mg total) by mouth once daily.    LORazepam (ATIVAN) 1 MG tablet Take 1 mg by mouth daily as needed for Anxiety.    metFORMIN (GLUCOPHAGE) 500 MG tablet Take 500 mg by mouth 3 (three) times daily.    ondansetron (ZOFRAN-ODT) 4 MG TbDL Take 1 tablet (4 mg total) by mouth every 8 (eight) hours as needed (nausea/vomiting).    rosuvastatin (CRESTOR) 20 MG tablet Take 1 tablet (20 mg total) by mouth every evening.    [DISCONTINUED] ibuprofen (ADVIL,MOTRIN) 800 MG tablet Take 800 mg by mouth every 6 (six) hours.    cetirizine (ZYRTEC) 10 MG tablet Take 1 tablet (10 mg total) by mouth once daily.    clotrimazole (CLOTRIMAZOLE 3 DAY) 2 % Place 1 applicator vaginally every evening. for 3 days    FREESTYLE LANCETS lancets 6 strips.     furosemide (LASIX) 20 MG tablet Take 20 mg by mouth twice a week.    ketoconazole (NIZORAL) 2 % cream Apply 1 application  topically 2 (two) times daily.    LIDOcaine (XYLOCAINE) 5 % Oint ointment Apply topically as needed (itching, burning).    NURTEC 75 mg odt Take 75 mg by mouth once as needed.    semaglutide, weight loss, (WEGOVY) 2.4 mg/0.75 mL PnIj Inject 2.4 mg into the skin every 7 days.    TRESIBA FLEXTOUCH U-100 100 unit/mL (3 mL) insulin pen SMARTSI Unit(s) SUB-Q Every Morning (Patient not taking: Reported on 2024)    [DISCONTINUED] albuterol (VENTOLIN HFA) 90 mcg/actuation inhaler Inhale 2 puffs into the lungs every 6 (six) hours as needed for Wheezing. Rescue    [DISCONTINUED] ergocalciferol (ERGOCALCIFEROL) 50,000 unit Cap Take 50,000 Units by mouth every 7 days.     [DISCONTINUED] fluticasone propionate (FLONASE) 50 mcg/actuation nasal spray 1 spray (50 mcg total) by Each Nostril route once daily.    [DISCONTINUED] hyoscyamine (ANASPAZ,LEVSIN) 0.125 mg Tab Take 1 tablet (125 mcg total) by mouth every 6 (six) hours as needed.    [DISCONTINUED] losartan (COZAAR) 50 MG tablet Take 50 mg by mouth every morning.    [DISCONTINUED] ondansetron (ZOFRAN) 8 MG tablet Take 1 tablet (8 mg total) by mouth every 8 (eight) hours as needed for Nausea.    [DISCONTINUED] ondansetron (ZOFRAN-ODT) 4 MG TbDL Take 1 tablet (4 mg total) by mouth every 8 (eight) hours as needed (nausea).    [DISCONTINUED] pantoprazole (PROTONIX) 40 MG tablet Take 1 tablet (40 mg total) by mouth once daily.    [DISCONTINUED] semaglutide, weight loss, (WEGOVY) 0.25 mg/0.5 mL PnIj Inject 0.25 mg into the skin every 7 days.    [DISCONTINUED] semaglutide, weight loss, (WEGOVY) 0.5 mg/0.5 mL PnIj Inject 0.5 mg into the skin every 7 days.    [DISCONTINUED] semaglutide, weight loss, (WEGOVY) 1 mg/0.5 mL PnIj Inject 1 mg into the skin every 7 days.    [DISCONTINUED] semaglutide, weight loss, (WEGOVY) 1.7 mg/0.75 mL PnIj Inject 1.7 mg into the skin every 7 days.     Family History       Problem Relation (Age of Onset)    Cancer Maternal Aunt, Maternal Grandmother    Diabetes Paternal Grandmother, Paternal Grandfather    Heart attack Mother    Heart disease Mother, Maternal Grandmother, Paternal Grandmother    Hypertension Mother    Stroke Brother          Tobacco Use    Smoking status: Never    Smokeless tobacco: Never   Substance and Sexual Activity    Alcohol use: No    Drug use: No    Sexual activity: Yes     Partners: Male     Review of Systems   HENT: Negative.     Eyes: Negative.    Respiratory: Negative.     Cardiovascular: Negative.    Gastrointestinal: Negative.    Musculoskeletal: Negative.    Skin: Negative.    Neurological:  Positive for weakness.   All other systems reviewed and are negative.    Objective:      Vital Signs (Most Recent):  Temp: 98.1 °F (36.7 °C) (06/18/24 1449)  Pulse: 94 (06/18/24 2259)  Resp: 20 (06/18/24 1449)  BP: (!) 157/97 (06/18/24 2259)  SpO2: 98 % (06/18/24 2259) Vital Signs (24h Range):  Temp:  [98.1 °F (36.7 °C)] 98.1 °F (36.7 °C)  Pulse:  [] 94  Resp:  [20] 20  SpO2:  [97 %-100 %] 98 %  BP: (143-176)/(86-97) 157/97     Weight: 80.7 kg (178 lb)  Body mass index is 29.62 kg/m².     Physical Exam  Constitutional:       Appearance: Normal appearance.   HENT:      Head: Normocephalic and atraumatic.      Nose: Nose normal.   Eyes:      Pupils: Pupils are equal, round, and reactive to light.   Cardiovascular:      Rate and Rhythm: Normal rate and regular rhythm.   Pulmonary:      Effort: Pulmonary effort is normal.   Abdominal:      Palpations: Abdomen is soft.   Musculoskeletal:      Cervical back: Neck supple.   Skin:     General: Skin is warm.   Neurological:      Mental Status: She is alert.      Comments: Subtle weakness in the lower extremity.  Sensation intact.  Upper extremity without any weakness.  Speech is normal.   Psychiatric:         Behavior: Behavior normal.              CRANIAL NERVES     CN III, IV, VI   Pupils are equal, round, and reactive to light.       Significant Labs: All pertinent labs within the past 24 hours have been reviewed.  Recent Lab Results  (Last 5 results in the past 24 hours)        06/18/24 2025 06/18/24 2023 06/18/24 1923 06/18/24  1920 06/18/24  1622        Influenza A, Molecular   Negative             Influenza B, Molecular   Negative             Allens Test     N/A           Beta-Hydroxybutyrate       0.1         Site     Other           [facility]     Room Air           Flu A & B Source   Nasal swab             QRS Duration         80       [facility] QTC Calculation         445       Osmolality       301         POC BE     0           POC Glucose 362               POC HCO3     25.7           POC PCO2     44.2           POC PH     7.372            POC PO2     26           POC SATURATED O2     45           POC TCO2     27           Sample     VENOUS           SARS-CoV-2 RNA, Amplification, Qual   Negative  Comment: This test utilizes isothermal nucleic acid amplification technology   to   detect the SARS-CoV-2 RdRp nucleic acid segment. The analytical   sensitivity   (limit of detection) is 500 copies/swab.     A POSITIVE result is indicative of the presence of SARS-CoV-2 RNA;   clinical   correlation with patient history and other diagnostic information is   necessary to determine patient infection status.    A NEGATIVE result means that SARS-CoV-2 nucleic acids are not present   above   the limit of detection. A NEGATIVE result should be treated as   presumptive.   It does not rule out the possibility of COVID-19 and should not be   the sole   basis for treatment decisions. If COVID-19 is strongly suspected   based on   clinical and exposure history, re-testing using an alternate   molecular assay   should be considered.     This test is FDA approved.Performance characteristics of this test   has been   independently verified by [facility] in conjunction   with   [facility] Department of Pathology and Laboratory   Medicine.                                      Significant Imaging: I have reviewed all pertinent imaging results/findings within the past 24 hours.  Assessment/Plan:     * Acute ischemic stroke  Admit using the stroke protocol  Permissive hypertension   Aspirin 81 mg daily   Atorvastatin 80 mg daily   PT/OT/SLP  Echocardiogram   Neurology consulted  Serial neuro check      Hypertension  Hold oral medication   Permissive hypertension      Diabetes mellitus without complication  Hold oral medication   Insulin sliding scale   We will need diabetic education      VTE Risk Mitigation (From admission, onward)           Ordered     heparin (porcine) injection 5,000 Units  Every 8 hours         06/18/24 8576     IP VTE HIGH  RISK PATIENT  Once         06/18/24 2236     Place sequential compression device  Until discontinued         06/18/24 2236                                    Yusuf Galeana MD  Department of Hospital Medicine  [facility] - Emergency Dept

## 2024-06-19 NOTE — PT/OT/SLP EVAL
Speech Language Pathology Evaluation  Bedside Swallow    Patient Name:  Meg Lockhart   MRN:  [id number]  Admitting Diagnosis: Acute ischemic stroke    Recommendations:                 General Recommendations:  Cognitive-linguistic evaluation per orders  Diet recommendations:  Regular, Thin   Aspiration Precautions: Standard aspiration precautions   General Precautions: Standard,    Communication strategies:  none    Assessment:     Meg Lockhart is a 54 y.o. female with an admitting diagnosis of Acute ischemic stroke.  Clinical swallowing evaluation completed. All po trials consumed with functional oral phase and no overt s/s airway compromise. REC Regular (IDDSI 7) textures with thin liquids. Will follow up for completion of cognitive communication evaluation once pt out of ED express.      History:   PER HPI: This is a 54-year-old female who has been having vertigo and unsteady gait starting June 2, 2024.  She was seen at a hospital and they were concerned that she may have a stroke but the patient left without getting an MRI.  Her symptoms include vertigo and loss of balance in the lower extremity.  She also says that she feels burning sensation in the lower extremity.  She has a history of hypertension and diabetes.  There are significant history of stroke in her family.  A stroke alert was ordered and an MRI was followed showing an acute bilateral cerebellar infarct.  MRA was also performed which did not show significant occlusion.  Neurology was consulted and recommended inpatient admission.  Patient otherwise awake alert and oriented.  No nausea or vomiting.  Complained of bilateral lower extremity weakness.     No new subjective & objective note has been filed under this hospital service since the last note was generated.    Past Medical History:   Diagnosis Date    Anxiety     Diabetes mellitus     Elevated cholesterol     GERD (gastroesophageal reflux disease)     Hypertension     PUD (peptic  ulcer disease)        Past Surgical History:   Procedure Laterality Date    GASTRIC BYPASS      HYSTERECTOMY         Social History: Patient lives with family and children.    Prior Intubation HX:  NA    Modified Barium Swallow: NA    Imaging:  Imaging Results              CTA Head and Neck (xpd) (Final result)  Result time 06/18/24 23:54:33      Final result by Myesha Linn MD (06/18/24 23:54:33)                   Impression:      No appreciable acute abnormality on CTA.    No high-grade stenosis or major vessel occlusion.      Electronically signed by: Myesha Linn  Date:    06/18/2024  Time:    23:54               Narrative:    EXAMINATION:  CTA HEAD AND NECK (XPD)    CLINICAL HISTORY:  stroke;    TECHNIQUE:  Non contrast low dose axial images were obtained through the head. CT angiogram was performed from the level of the hattie to the top of the head following the IV administration of 100mL of Omnipaque 350.   Sagittal and coronal reconstructions and maximum intensity projection reconstructions were performed. Arterial stenosis percentages are based on NASCET measurement criteria.    COMPARISON:  MRA and MRI brain 06/18/2024    FINDINGS:  Intracranial Compartment:    Limited imaging demonstrates ventricles and sulci are normal in size for age without evidence of hydrocephalus. No appreciable is extra-axial blood or fluid collections.    The brain parenchyma appears normal. No parenchymal mass, hemorrhage, edema, or major vascular distribution infarct.  Known cerebellar infarcts are not visualized.    Skull/Extracranial Contents (limited evaluation): No fracture. Mastoid air cells and paranasal sinuses are essentially clear.    Non-Vascular Structures of the Neck/Thoracic Inlet (limited evaluation): Normal.    Aorta: Normal 3 vessel arch.    Extracranial carotid circulation: No hemodynamically significant stenosis, aneurysmal dilatation, or dissection.  40% stenosis at the origin of the right internal  carotid artery secondary to atherosclerotic calcification.    Extracranial vertebral circulation: No hemodynamically significant stenosis, aneurysmal dilatation, or dissection.    Intracranial Arteries: No focal high-grade stenosis, occlusion, or aneurysm.    Venous structures (limited evaluation): Normal.                                       MRA Brain without contrast (Final result)  Result time 06/18/24 21:59:25      Final result by Halley Mckeon MD (06/18/24 21:59:25)                   Impression:      No large vessel occlusion.      Electronically signed by: Halley Mckeon  Date:    06/18/2024  Time:    21:59               Narrative:    EXAMINATION:  MRA BRAIN WITHOUT CONTRAST    CLINICAL HISTORY:  concern for stroke; .    TECHNIQUE:  Non-contrast 3-D time-of-flight intracranial MR angiography was performed through the [place] with MIP reformatting.    COMPARISON:  None.    FINDINGS:  Anterior intracranial circulation: No high-grade focal stenosis, occlusion, aneurysm, or vascular malformation.    Posterior intracranial circulation: No high-grade focal stenosis, occlusion, aneurysm, or vascular malformation.                                       MRI Brain Without Contrast (Final result)  Result time 06/18/24 21:50:15      Final result by Halley Mckeon MD (06/18/24 21:50:15)                   Impression:      Acute bilateral cerebellar infarcts.    At least 20 T2/FLAIR hyperintense foci in the periventricular and deep white matter including the right side of the midbrain and joaquin.  Considerations include chronic microvascular ischemia, demyelinating disease, or migraine headaches.    COMMUNICATION  The critical information above was relayed directly by Halley Mckeon by Epic secure chat (with acknowledgement) to JOI Barone MD and KEVIN Joseph MD on 6/18/2024 at 930 PM      Electronically signed by: Halley Mckeon  Date:    06/18/2024  Time:    21:50               Narrative:     EXAMINATION:  MRI BRAIN WITHOUT CONTRAST    CLINICAL HISTORY:  concern for stroke;.    TECHNIQUE:  Multiplanar multisequence MR imaging of the brain was performed without contrast.    COMPARISON:  None    FINDINGS:  Intracranial compartment:    Ventricles and sulci are normal in size for age without evidence of hydrocephalus. No extra-axial blood or fluid collections.    Acute infarcts throughout the bilateral cerebellar hemispheres, right greater than left.  No mass lesion or acute hemorrhage.  At least 20 T2/FLAIR hyperintense foci in the periventricular and deep white matter including the right side of the midbrain and joaquin.    Normal vascular flow voids are preserved.    Skull/extracranial contents (limited evaluation): Bone marrow signal intensity is normal.                                       X-Ray Chest PA And Lateral (Final result)  Result time 06/18/24 20:46:25      Final result by Myesha Linn MD (06/18/24 20:46:25)                   Impression:    [  No acute abnormality.      Electronically signed by: Myesha Linn  Date:    06/18/2024  Time:    20:46               Narrative:    EXAMINATION:  XR CHEST PA AND LATERAL    CLINICAL HISTORY:  Nausea with vomiting, unspecified    TECHNIQUE:  PA and lateral views of the chest were performed.    COMPARISON:  12/30/2019, 08/14/2013 chest x-ray    FINDINGS:  Cardiac silhouette is not enlarged.  Lungs appear clear.  There is no pleural effusion or pneumothorax.  Osseous structures are intact.                                       Prior diet: Regular/thin.    Occupation/hobbies/homemaking: Pt/family report PLOF as independent with ADLS, independent with IADLS. Pt does drive. Level of education is HS and trade school .    Subjective     Denies dysphagia  Wants to eat    Pain/Comfort:  Pain Rating 1: 0/10    Respiratory Status: Room air    Objective:   Pt seen in ED express for clinical swallow evaluation She is AAOx4, pleasant and cooperative. Pt denies  any changes in speech, language, cognition or swallowing. Able to provide reliable history. Speech is clear, fluent and appropriate.     Oral Musculature Evaluation  Oral Musculature: WFL  Dentition: present and adequate  Secretion Management: adequate  Mucosal Quality: good  Mandibular Strength and Mobility: WFL  Oral Labial Strength and Mobility: WFL  Lingual Strength and Mobility: WFL  Velar Elevation: WFL  Buccal Strength and Mobility: WFL  Volitional Cough: adequate  Volitional Swallow: able to palpate laryngeal rise  Voice Prior to PO Intake: clear    Bedside Swallow Eval:   Consistencies Assessed:  Thin liquids --via cup and straw  Puree --applesauce  Mixed consistencies --diced peaches  Solids --arianna cracker      Oral Phase:   WNL    Pharyngeal Phase:   no overt clinical signs/symptoms of aspiration  no overt clinical signs/symptoms of pharyngeal dysphagia    Compensatory Strategies  None    Treatment: Pt/family educated re: results/recs of evaluation, cognitive communication evaluation once in regular room, SLP role and POC. Receptive to information provided.     Goals:   Multidisciplinary Problems       SLP Goals          Problem: SLP    Goal Priority Disciplines Outcome   SLP Goal     SLP Progressing   Description: 1. Pt will participate in cognitive communication evaluation                       Plan:     Patient to be seen:   (TBD)   Plan of Care expires:     Plan of Care reviewed with:  patient   SLP Follow-Up:  Yes       Discharge recommendations:   (TBD)   Barriers to Discharge:  None    Time Tracking:     SLP Treatment Date:   06/19/24  Speech Start Time:  1123  Speech Stop Time:  1132     Speech Total Time (min):  9 min    Billable Minutes: Eval Swallow and Oral Function 9 and Total Time 9    06/19/2024

## 2024-06-19 NOTE — SUBJECTIVE & OBJECTIVE
Past Medical History:   Diagnosis Date    Anxiety     Diabetes mellitus     Elevated cholesterol     GERD (gastroesophageal reflux disease)     Hypertension     PUD (peptic ulcer disease)        Past Surgical History:   Procedure Laterality Date    GASTRIC BYPASS      HYSTERECTOMY         Review of patient's allergies indicates:   Allergen Reactions    Penicillins Itching and Anaphylaxis    Sulfa (sulfonamide antibiotics) Anaphylaxis    Meperidine Itching       No current facility-administered medications on file prior to encounter.     Current Outpatient Medications on File Prior to Encounter   Medication Sig    acetaminophen (TYLENOL) 650 MG TbSR Take 650 mg by mouth every 8 (eight) hours.    amitriptyline (ELAVIL) 10 MG tablet Take 10 mg by mouth every evening.    amLODIPine (NORVASC) 10 MG tablet Take 0.5 tablets (5 mg total) by mouth once daily.    chlorthalidone (HYGROTEN) 25 MG Tab Take 1 tablet (25 mg total) by mouth once daily.    famotidine (PEPCID) 40 MG tablet Take 1 tablet (40 mg total) by mouth once daily.    LORazepam (ATIVAN) 1 MG tablet Take 1 mg by mouth daily as needed for Anxiety.    metFORMIN (GLUCOPHAGE) 500 MG tablet Take 500 mg by mouth 3 (three) times daily.    ondansetron (ZOFRAN-ODT) 4 MG TbDL Take 1 tablet (4 mg total) by mouth every 8 (eight) hours as needed (nausea/vomiting).    rosuvastatin (CRESTOR) 20 MG tablet Take 1 tablet (20 mg total) by mouth every evening.    [DISCONTINUED] ibuprofen (ADVIL,MOTRIN) 800 MG tablet Take 800 mg by mouth every 6 (six) hours.    cetirizine (ZYRTEC) 10 MG tablet Take 1 tablet (10 mg total) by mouth once daily.    clotrimazole (CLOTRIMAZOLE 3 DAY) 2 % Place 1 applicator vaginally every evening. for 3 days    FREESTYLE LANCETS lancets 6 strips.     furosemide (LASIX) 20 MG tablet Take 20 mg by mouth twice a week.    ketoconazole (NIZORAL) 2 % cream Apply 1 application  topically 2 (two) times daily.    LIDOcaine (XYLOCAINE) 5 % Oint ointment Apply  topically as needed (itching, burning).    NURTEC 75 mg odt Take 75 mg by mouth once as needed.    semaglutide, weight loss, (WEGOVY) 2.4 mg/0.75 mL PnIj Inject 2.4 mg into the skin every 7 days.    TRESIBA FLEXTOUCH U-100 100 unit/mL (3 mL) insulin pen SMARTSI Unit(s) SUB-Q Every Morning (Patient not taking: Reported on 2024)    [DISCONTINUED] albuterol (VENTOLIN HFA) 90 mcg/actuation inhaler Inhale 2 puffs into the lungs every 6 (six) hours as needed for Wheezing. Rescue    [DISCONTINUED] ergocalciferol (ERGOCALCIFEROL) 50,000 unit Cap Take 50,000 Units by mouth every 7 days.    [DISCONTINUED] fluticasone propionate (FLONASE) 50 mcg/actuation nasal spray 1 spray (50 mcg total) by Each Nostril route once daily.    [DISCONTINUED] hyoscyamine (ANASPAZ,LEVSIN) 0.125 mg Tab Take 1 tablet (125 mcg total) by mouth every 6 (six) hours as needed.    [DISCONTINUED] losartan (COZAAR) 50 MG tablet Take 50 mg by mouth every morning.    [DISCONTINUED] ondansetron (ZOFRAN) 8 MG tablet Take 1 tablet (8 mg total) by mouth every 8 (eight) hours as needed for Nausea.    [DISCONTINUED] ondansetron (ZOFRAN-ODT) 4 MG TbDL Take 1 tablet (4 mg total) by mouth every 8 (eight) hours as needed (nausea).    [DISCONTINUED] pantoprazole (PROTONIX) 40 MG tablet Take 1 tablet (40 mg total) by mouth once daily.    [DISCONTINUED] semaglutide, weight loss, (WEGOVY) 0.25 mg/0.5 mL PnIj Inject 0.25 mg into the skin every 7 days.    [DISCONTINUED] semaglutide, weight loss, (WEGOVY) 0.5 mg/0.5 mL PnIj Inject 0.5 mg into the skin every 7 days.    [DISCONTINUED] semaglutide, weight loss, (WEGOVY) 1 mg/0.5 mL PnIj Inject 1 mg into the skin every 7 days.    [DISCONTINUED] semaglutide, weight loss, (WEGOVY) 1.7 mg/0.75 mL PnIj Inject 1.7 mg into the skin every 7 days.     Family History       Problem Relation (Age of Onset)    Cancer Maternal Aunt, Maternal Grandmother    Diabetes Paternal Grandmother, Paternal Grandfather    Heart attack Mother     Heart disease Mother, Maternal Grandmother, Paternal Grandmother    Hypertension Mother    Stroke Brother          Tobacco Use    Smoking status: Never    Smokeless tobacco: Never   Substance and Sexual Activity    Alcohol use: No    Drug use: No    Sexual activity: Yes     Partners: Male     Review of Systems   HENT: Negative.     Eyes: Negative.    Respiratory: Negative.     Cardiovascular: Negative.    Gastrointestinal: Negative.    Musculoskeletal: Negative.    Skin: Negative.    Neurological:  Positive for weakness.   All other systems reviewed and are negative.    Objective:     Vital Signs (Most Recent):  Temp: 98.1 °F (36.7 °C) (06/18/24 1449)  Pulse: 94 (06/18/24 2259)  Resp: 20 (06/18/24 1449)  BP: (!) 157/97 (06/18/24 2259)  SpO2: 98 % (06/18/24 2259) Vital Signs (24h Range):  Temp:  [98.1 °F (36.7 °C)] 98.1 °F (36.7 °C)  Pulse:  [] 94  Resp:  [20] 20  SpO2:  [97 %-100 %] 98 %  BP: (143-176)/(86-97) 157/97     Weight: 80.7 kg (178 lb)  Body mass index is 29.62 kg/m².     Physical Exam  Constitutional:       Appearance: Normal appearance.   HENT:      Head: Normocephalic and atraumatic.      Nose: Nose normal.   Eyes:      Pupils: Pupils are equal, round, and reactive to light.   Cardiovascular:      Rate and Rhythm: Normal rate and regular rhythm.   Pulmonary:      Effort: Pulmonary effort is normal.   Abdominal:      Palpations: Abdomen is soft.   Musculoskeletal:      Cervical back: Neck supple.   Skin:     General: Skin is warm.   Neurological:      Mental Status: She is alert.      Comments: Subtle weakness in the lower extremity.  Sensation intact.  Upper extremity without any weakness.  Speech is normal.   Psychiatric:         Behavior: Behavior normal.              CRANIAL NERVES     CN III, IV, VI   Pupils are equal, round, and reactive to light.       Significant Labs: All pertinent labs within the past 24 hours have been reviewed.  Recent Lab Results  (Last 5 results in the past 24 hours)         06/18/24 2025 06/18/24 2023 06/18/24  1923   06/18/24  1920   06/18/24  1622        Influenza A, Molecular   Negative             Influenza B, Molecular   Negative             [facility]ens Test     N/A           Beta-Hydroxybutyrate       0.1         Site     Other           [facility]     Room Air           Flu A & B Source   Nasal swab             QRS Duration         80       [facility] QTC Calculation         445       Osmolality       301         POC BE     0           POC Glucose 362               POC HCO3     25.7           POC PCO2     44.2           POC PH     7.372           POC PO2     26           POC SATURATED O2     45           POC TCO2     27           Sample     VENOUS           SARS-CoV-2 RNA, Amplification, Qual   Negative  Comment: This test utilizes isothermal nucleic acid amplification technology   to   detect the SARS-CoV-2 RdRp nucleic acid segment. The analytical   sensitivity   (limit of detection) is 500 copies/swab.     A POSITIVE result is indicative of the presence of SARS-CoV-2 RNA;   clinical   correlation with patient history and other diagnostic information is   necessary to determine patient infection status.    A NEGATIVE result means that SARS-CoV-2 nucleic acids are not present   above   the limit of detection. A NEGATIVE result should be treated as   presumptive.   It does not rule out the possibility of COVID-19 and should not be   the sole   basis for treatment decisions. If COVID-19 is strongly suspected   based on   clinical and exposure history, re-testing using an alternate   molecular assay   should be considered.     This test is FDA approved.Performance characteristics of this test   has been   independently verified by [facility] in conjunction   with   [facility] Department of Pathology and Laboratory   Medicine.                                      Significant Imaging: I have reviewed all pertinent imaging results/findings within the past 24  hours.

## 2024-06-19 NOTE — PLAN OF CARE
Problem: SLP  Goal: SLP Goal  Description: 1. Pt will participate in cognitive communication evaluation  Outcome: Progressing    Clinical swallowing evaluation completed. All po trials consumed with functional oral phase and no overt s/s airway compromise. REC Regular (IDDSI 7) textures with thin liquids. Will follow up for completion of cognitive communication evaluation once pt out of ED express.

## 2024-06-19 NOTE — CONSULTS
[facility]  Neurology Consult    Patient Name: Meg Lockhart   MRN: [id number]  : 1970  TODAY'S DATE: 2024  ADMIT DATE: 2024  2:45 PM                                          CONSULTED PROVIDER: Asuncion Christianson MD, Neurologist. On-call Phone: [phone]  CONSULT REQUESTED BY: Albertina Joseph MD     Chief Complaint   Patient presents with    Dizziness     X 3 days    Vomiting    Fatigue        HPI per EMR:  This is a 54-year-old female who has been having vertigo and unsteady gait starting 2024. She was seen at a hospital and they were concerned that she may have a stroke but the patient left without getting an MRI. Her symptoms include vertigo and loss of balance in the lower extremity. She also says that she feels burning sensation in the lower extremity. She has a history of hypertension and diabetes. There are significant history of stroke in her family. A stroke alert was ordered and an MRI was followed showing an acute bilateral cerebellar infarct. MRA was also performed which did not show significant occlusion. Neurology was consulted and recommended inpatient admission. Patient otherwise awake alert and oriented. No nausea or vomiting. Complained of bilateral lower extremity weakness.     Neurology Consult: Patient was seen and examined by me today. She is a 53 yo woman with history of uncontrolled DM and HLD, who presented to the ED with unsteady gait, dizziness, nausea and vomiting that started on 24. At the time, she presented to the ED but left AMA without undergoing MRI brain. She returned to the ED because the symptoms returned and she was having episodes of severe nausea, vomiting and vertigo. She was not taking antiplatelets for this. On arrival to the ED, MRI brain was obtained and showed acute to subacute strokes in bilateral cerebellar hemispheres, so she was admitted for workup and treatment.    Review of Systems:    A comprehensive ROS  was performed and all systems were negative except as noted above in HPI.    Past Medical History:  has a past medical history of Anxiety, Diabetes mellitus, Elevated cholesterol, GERD (gastroesophageal reflux disease), Hypertension, and PUD (peptic ulcer disease).    Past Surgical History:  has a past surgical history that includes Hysterectomy and Gastric bypass.    Family Medical History: family history includes Cancer in her maternal aunt and maternal grandmother; Diabetes in her paternal grandfather and paternal grandmother; Heart attack in her mother; Heart disease in her maternal grandmother, mother, and paternal grandmother; Hypertension in her mother; Stroke in her brother.    Social History:  reports that she has never smoked. She has never used smokeless tobacco. She reports that she does not drink alcohol and does not use drugs.    PCP: Drew Campuzano MD    Allergies:   Review of patient's allergies indicates:   Allergen Reactions    Penicillins Itching and Anaphylaxis    Sulfa (sulfonamide antibiotics) Anaphylaxis    Meperidine Itching       Medications:   No current facility-administered medications on file prior to encounter.     Current Outpatient Medications on File Prior to Encounter   Medication Sig Dispense Refill    acetaminophen (TYLENOL) 650 MG TbSR Take 650 mg by mouth every 8 (eight) hours.      amitriptyline (ELAVIL) 10 MG tablet Take 10 mg by mouth every evening.      amLODIPine (NORVASC) 10 MG tablet Take 0.5 tablets (5 mg total) by mouth once daily. 90 tablet 3    chlorthalidone (HYGROTEN) 25 MG Tab Take 1 tablet (25 mg total) by mouth once daily. 30 tablet 11    famotidine (PEPCID) 40 MG tablet Take 1 tablet (40 mg total) by mouth once daily. 30 tablet 11    LORazepam (ATIVAN) 1 MG tablet Take 1 mg by mouth daily as needed for Anxiety.      metFORMIN (GLUCOPHAGE) 500 MG tablet Take 500 mg by mouth 3 (three) times daily.      ondansetron (ZOFRAN-ODT) 4 MG TbDL Take 1 tablet (4 mg total)  by mouth every 8 (eight) hours as needed (nausea/vomiting). 20 tablet 0    rosuvastatin (CRESTOR) 20 MG tablet Take 1 tablet (20 mg total) by mouth every evening. 90 tablet 3    cetirizine (ZYRTEC) 10 MG tablet Take 1 tablet (10 mg total) by mouth once daily. 30 tablet 0    clotrimazole (CLOTRIMAZOLE 3 DAY) 2 % Place 1 applicator vaginally every evening. for 3 days 21 g 0    FREESTYLE LANCETS lancets 6 strips.       furosemide (LASIX) 20 MG tablet Take 20 mg by mouth twice a week.      ketoconazole (NIZORAL) 2 % cream Apply 1 application  topically 2 (two) times daily.      LIDOcaine (XYLOCAINE) 5 % Oint ointment Apply topically as needed (itching, burning). 30 g 0    NURTEC 75 mg odt Take 75 mg by mouth once as needed.      semaglutide, weight loss, (WEGOVY) 2.4 mg/0.75 mL PnIj Inject 2.4 mg into the skin every 7 days. 3 mL 0    TRESIBA FLEXTOUCH U-100 100 unit/mL (3 mL) insulin pen SMARTSI Unit(s) SUB-Q Every Morning (Patient not taking: Reported on 2024)       Scheduled Meds:   amitriptyline  10 mg Oral QHS    aspirin  81 mg Oral Daily    atorvastatin  80 mg Oral Daily    heparin (porcine)  5,000 Units Subcutaneous Q8H     Continuous Infusions:  PRN Meds:.  Current Facility-Administered Medications:     bisacodyL, 10 mg, Rectal, Daily PRN    dextrose 50%, 12.5 g, Intravenous, PRN    dextrose 50%, 25 g, Intravenous, PRN    glucagon (human recombinant), 1 mg, Intramuscular, PRN    glucose, 16 g, Oral, PRN    glucose, 24 g, Oral, PRN    insulin aspart U-100, 0-5 Units, Subcutaneous, QID (AC + HS) PRN    labetalol, 10 mg, Intravenous, Q15 Min PRN    LORazepam, 1 mg, Oral, Daily PRN    ondansetron, 4 mg, Intravenous, Q12H PRN    sodium chloride 0.9%, 500 mL, Intravenous, PRN    sodium chloride 0.9%, 10 mL, Intravenous, PRN      Physical Exam  Current Vitals:  Vitals:    24 0546   BP: 119/81   Pulse: 95   Resp:    Temp:        Physical Exam:  General: AO x4  HEENT: PERRL, EOMI  CV: RRR  Lungs: no  respiratory distress  Abdomen: soft    Neurological Exam    MENTAL STATUS EXAM:  Level of alertness: Alert  Level of attention: Attentive w/out deficit  Orientation/Awareness: intact to person, place, time, situation  Language: fluent. Comprehension/repetition/naming intact    CRANIAL NERVE EXAM:  II/III: fundoscopic exam deferred, PERRL; visual fields full to confrontation  III/IV/VI: EOMI with horizontal and vertical nystagmus  V: no deficits appreciated to light touch  VII: no facial asymmetry noted  VIII: no deficits in hearing bilaterally  IX/X: palate @ ML and raises symmetrically  XI: shoulder shrug 5/5 bilaterally; head turn 5/5 bilaterally  XII: tongue to midline w/out asymmetry  No dysarthria noted on exam.    MOTOR EXAM:  Bulk and Tone: normal throughout  Strength is 5/5 proximally and distally in upper and lower extremities without deficits.  No pronator drift in bilateral UE. No tremor either at rest or with intention.    REFLEXES:  Absent reflexes in upper and lower extremities.     SENSORY EXAM  Length dependent sensory loss in lower extremities, worse on left foot.    COORDINATION/CEREBELLAR EXAM:  FTN: mild dysmetria and dysdiadochokinesia  HTS: mild ataxia    GAIT:  Deferred for safety.    NIH Stroke Scale      Date: 06/19/2024  Time: 1530  Person Administering Scale: Asuncion Christianson MD    Administer stroke scale items in the order listed. Record performance in each category after each subscale exam. Do not go back and change scores. Follow directions provided for each exam technique. Scores should reflect what the patient does, not what the clinician thinks the patient can do. The clinician should record answers while administering the exam and work quickly. Except where indicated, the patient should not be coached (i.e., repeated requests to patient to make a special effort).      1a  Level of consciousness: 0=alert; keenly responsive   1b. LOC questions:  0=Answers both tasks correctly   1c.  LOC commands: 0=Answers both tasks correctly   2.  Best Gaze: 0=normal   3.  Visual: 0=No visual loss   4. Facial Palsy: 0=Normal symmetric movement   5a.  Motor left arm: 0=No drift, limb holds 90 (or 45) degrees for full 10 seconds   5b.  Motor right arm: 0=No drift, limb holds 90 (or 45) degrees for full 10 seconds   6a. motor left le=No drift, limb holds 90 (or 45) degrees for full 10 seconds   6b  Motor right le=No drift, limb holds 90 (or 45) degrees for full 10 seconds   7. Limb Ataxia: 2=Present in two limbs   8.  Sensory: 0=Normal; no sensory loss   9. Best Language:  0=No aphasia, normal   10. Dysarthria: 0=Normal   11. Extinction and Inattention: 0=No abnormality    Total:   2       Laboratory Data & Studies    Recent Labs   Lab 24  1551   WBC 7.40   HGB 12.5      MCV 89       Recent Labs   Lab 24  1551 24  0546   * 135*   K 3.9 3.8    104   CO2 24 21*   BUN 16 17   * 249*   CALCIUM 9.0 8.8   MG 1.7 1.7   PHOS 2.9 3.4       Recent Labs   Lab 24  1551 24  0546   PROT 6.7 6.1   ALBUMIN 3.7 3.5   BILITOT 0.3 0.5   AST 17 14   ALT 13 11   ALKPHOS 74 68       Recent Labs   Lab 24  0546   INR 0.9   APTT <21.0       Recent Labs   Lab 24  2308   CHOL 140   TRIG 155*   LDLCALC 55.0*   HDL 54   TSH 2.633       Microbiology:  Microbiology Results (last 7 days)       Procedure Component Value Units Date/Time    Urine culture [[id number]] Collected: 24 1607    Order Status: Completed Specimen: Urine Updated: 24 0743     Urine Culture, Routine No growth to date    Narrative:      Specimen Source->Urine            Imaging:  CTA Head and Neck (xpd)    Result Date: 2024  EXAMINATION: CTA HEAD AND NECK (XPD) CLINICAL HISTORY: stroke; TECHNIQUE: Non contrast low dose axial images were obtained through the head. CT angiogram was performed from the level of the hattie to the top of the head following the IV administration of 100mL of  Omnipaque 350.   Sagittal and coronal reconstructions and maximum intensity projection reconstructions were performed. Arterial stenosis percentages are based on NASCET measurement criteria. COMPARISON: MRA and MRI brain 06/18/2024 FINDINGS: Intracranial Compartment: Limited imaging demonstrates ventricles and sulci are normal in size for age without evidence of hydrocephalus. No appreciable is extra-axial blood or fluid collections. The brain parenchyma appears normal. No parenchymal mass, hemorrhage, edema, or major vascular distribution infarct.  Known cerebellar infarcts are not visualized. Skull/Extracranial Contents (limited evaluation): No fracture. Mastoid air cells and paranasal sinuses are essentially clear. Non-Vascular Structures of the Neck/Thoracic Inlet (limited evaluation): Normal. Aorta: Normal 3 vessel arch. Extracranial carotid circulation: No hemodynamically significant stenosis, aneurysmal dilatation, or dissection.  40% stenosis at the origin of the right internal carotid artery secondary to atherosclerotic calcification. Extracranial vertebral circulation: No hemodynamically significant stenosis, aneurysmal dilatation, or dissection. Intracranial Arteries: No focal high-grade stenosis, occlusion, or aneurysm. Venous structures (limited evaluation): Normal.     No appreciable acute abnormality on CTA. No high-grade stenosis or major vessel occlusion. Electronically signed by: Myesha Linn Date:    06/18/2024 Time:    23:54    MRA Brain without contrast    Result Date: 6/18/2024  EXAMINATION: MRA BRAIN WITHOUT CONTRAST CLINICAL HISTORY: concern for stroke; . TECHNIQUE: Non-contrast 3-D time-of-flight intracranial MR angiography was performed through the [place] with MIP reformatting. COMPARISON: None. FINDINGS: Anterior intracranial circulation: No high-grade focal stenosis, occlusion, aneurysm, or vascular malformation. Posterior intracranial circulation: No high-grade focal  stenosis, occlusion, aneurysm, or vascular malformation.     No large vessel occlusion. Electronically signed by: Halley Mckeon Date:    06/18/2024 Time:    21:59    MRI Brain Without Contrast    Result Date: 6/18/2024  EXAMINATION: MRI BRAIN WITHOUT CONTRAST CLINICAL HISTORY: concern for stroke;. TECHNIQUE: Multiplanar multisequence MR imaging of the brain was performed without contrast. COMPARISON: None FINDINGS: Intracranial compartment: Ventricles and sulci are normal in size for age without evidence of hydrocephalus. No extra-axial blood or fluid collections. Acute infarcts throughout the bilateral cerebellar hemispheres, right greater than left.  No mass lesion or acute hemorrhage.  At least 20 T2/FLAIR hyperintense foci in the periventricular and deep white matter including the right side of the midbrain and joaquin. Normal vascular flow voids are preserved. Skull/extracranial contents (limited evaluation): Bone marrow signal intensity is normal.     Acute bilateral cerebellar infarcts. At least 20 T2/FLAIR hyperintense foci in the periventricular and deep white matter including the right side of the midbrain and joaquin.  Considerations include chronic microvascular ischemia, demyelinating disease, or migraine headaches. COMMUNICATION The critical information above was relayed directly by Halley Mckeon by Epic secure chat (with acknowledgement) to JOI Barone MD and KEVIN Joseph MD on 6/18/2024 at 930 PM Electronically signed by: Halley Mckeon Date:    06/18/2024 Time:    21:50    X-Ray Chest PA And Lateral    Result Date: 6/18/2024  EXAMINATION: XR CHEST PA AND LATERAL CLINICAL HISTORY: Nausea with vomiting, unspecified TECHNIQUE: PA and lateral views of the chest were performed. COMPARISON: 12/30/2019, 08/14/2013 chest x-ray FINDINGS: Cardiac silhouette is not enlarged.  Lungs appear clear.  There is no pleural effusion or pneumothorax.  Osseous structures are intact.     [ No acute abnormality.  Electronically signed by: Myesha Linn Date:    06/18/2024 Time:    20:46    CT Head Without Contrast    Result Date: 6/2/2024  EXAMINATION: CT HEAD WITHOUT CONTRAST CLINICAL HISTORY: Neuro deficit, acute, stroke suspected; TECHNIQUE: Low dose axial images were obtained through the head.  Coronal and sagittal reformations were also performed. Contrast was not administered. COMPARISON: 06/14/2021 FINDINGS: Normal size of the ventricular system.    No mass, hemorrhage or acute major vascular distribution infarct. No mass effect or midline shift. Included orbits are unremarkable. Paranasal sinuses and mastoid air cells are clear. Atherosclerosis.  No acute osseous abnormality.     No intracranial hemorrhage.  No evidence for recent ischemia, noting however that MRI could add sensitivity in an acute setting. Electronically signed by: Samir Underwood Date:    06/02/2024 Time:    10:59      Assessment:    Acute ischemic stroke in bilateral cerebellar hemispheres - etiology ongoing  55 yo woman with history of uncontrolled DM and HLD, who presented to the ED with unsteady gait, dizziness, nausea and vomiting that started on 6/2/24. At the time, she presented to the ED but left AMA without undergoing MRI brain, but returned for evaluation of persistent symptoms. MRI brain showed acute to subacute strokes in bilateral cerebellar hemispheres, so she was admitted for workup and treatment.    Stroke workup:  CTH: bilateral cerebellar hypodensities concerning for stroke  CTA head and neck: no LVO, left vertebral atherosclerosis, fenestrated basilar artery  MRI brain: acute ischemic stroke in bilateral cerebellar hemispheres, mild to moderate WMD  Risk factors: A1C 14.8, TSH 2.633, LDL 55  Echocardiogram: ordered and pending    Plan:  - Admitted to hospital medicine with q4 hour neuro checks, on telemetry, continuous pulse oximetry  - Diet after eval per SLP.  - Secondary stroke prevention with ASA 81 mg daily, atorvastatin 80  mg daily  - Risk factor stratification with HgbA1C, Fasting Lipid profile, TSH  - Ordered hypercoagulable panel in young patient with bilateral cerebellar strokes including protein C, protein S, phosphatidylserine IgM and IgG, anticardiolipin, beta glycoprotein IgM and IgG, factor 5 Leiden, prothrombin 2 mutation, MTHFR mutation, lipoprotein a, factor 8, homocysteine, antithrombin 3  - 2D echocardiogram as above  - Maintain Euthermia with Tylenol prn temp > 37.2 degrees C.  - Assessment for rehab with PT/OT/SLP evaluation and treatment.  - Permissive HTN systolic BP goal up to 220, diastolic up to 110 for 24H, then lower BP slowly to normotension  - Cardiac enzymes and EKG   - Will follow along      DVT prophylaxis with chemo/SCD prophylaxis      Patient to follow up with [facility] at [phone] within 7 days from discharge.     Stroke education was provided including stroke risk factors modification and any acute neurological changes including weakness, confusion, visual changes to come straight to the ER.     All questions were answered.                              Thank you kindly for including us in the care of this patient. Please do not hesitate to contact us with any questions.    Critical Care:  65 minutes of critical care time has been spent evaluating with the patient. Time includes chart review not limited to diagnostic imaging, labs, and vitals, patient assessment, discussion with family and nursing, current order evaluations, and new order entries.      Asuncion Christianson MD  Neurology

## 2024-06-20 LAB
ALBUMIN SERPL BCP-MCNC: 3.5 G/DL (ref 3.5–5.2)
ALP SERPL-CCNC: 68 U/L (ref 55–135)
ALT SERPL W/O P-5'-P-CCNC: 10 U/L (ref 10–44)
ANION GAP SERPL CALC-SCNC: 7 MMOL/L (ref 8–16)
AORTIC ROOT ANNULUS: 2.8 CM
AORTIC VALVE CUSP SEPERATION: 1.5 CM
AST SERPL-CCNC: 10 U/L (ref 10–40)
AV INDEX (PROSTH): 0.67
AV MEAN GRADIENT: 6 MMHG
AV PEAK GRADIENT: 11 MMHG
AV VALVE AREA BY VELOCITY RATIO: 2.15 CM²
AV VALVE AREA: 2.11 CM²
AV VELOCITY RATIO: 0.68
BACTERIA UR CULT: NORMAL
BACTERIA UR CULT: NORMAL
BASOPHILS # BLD AUTO: 0.01 K/UL (ref 0–0.2)
BASOPHILS NFR BLD: 0.2 % (ref 0–1.9)
BILIRUB SERPL-MCNC: 0.4 MG/DL (ref 0.1–1)
BSA FOR ECHO PROCEDURE: 1.92 M2
BUN SERPL-MCNC: 16 MG/DL (ref 6–20)
CALCIUM SERPL-MCNC: 9.1 MG/DL (ref 8.7–10.5)
CHLORIDE SERPL-SCNC: 103 MMOL/L (ref 95–110)
CO2 SERPL-SCNC: 25 MMOL/L (ref 23–29)
CREAT SERPL-MCNC: 1 MG/DL (ref 0.5–1.4)
CV ECHO LV RWT: 0.63 CM
DIFFERENTIAL METHOD BLD: ABNORMAL
DOP CALC AO PEAK VEL: 1.68 M/S
DOP CALC AO VTI: 29 CM
DOP CALC LVOT AREA: 3.1 CM2
DOP CALC LVOT DIAMETER: 2 CM
DOP CALC LVOT PEAK VEL: 1.15 M/S
DOP CALC LVOT STROKE VOLUME: 61.23 CM3
DOP CALC MV VTI: 21.5 CM
DOP CALCLVOT PEAK VEL VTI: 19.5 CM
E WAVE DECELERATION TIME: 190 MSEC
E/A RATIO: 0.91
E/E' RATIO: 11.85 M/S
ECHO LV POSTERIOR WALL: 1 CM (ref 0.6–1.1)
EOSINOPHIL # BLD AUTO: 0.1 K/UL (ref 0–0.5)
EOSINOPHIL NFR BLD: 1 % (ref 0–8)
ERYTHROCYTE [DISTWIDTH] IN BLOOD BY AUTOMATED COUNT: 12.4 % (ref 11.5–14.5)
EST. GFR  (NO RACE VARIABLE): >60 ML/MIN/1.73 M^2
FRACTIONAL SHORTENING: 31 % (ref 28–44)
GLUCOSE SERPL-MCNC: 235 MG/DL (ref 70–110)
GLUCOSE SERPL-MCNC: 273 MG/DL (ref 70–110)
GLUCOSE SERPL-MCNC: 289 MG/DL (ref 70–110)
GLUCOSE SERPL-MCNC: 321 MG/DL (ref 70–110)
GLUCOSE SERPL-MCNC: 375 MG/DL (ref 70–110)
HCT VFR BLD AUTO: 32.1 % (ref 37–48.5)
HGB BLD-MCNC: 10.6 G/DL (ref 12–16)
IMM GRANULOCYTES # BLD AUTO: 0.01 K/UL (ref 0–0.04)
IMM GRANULOCYTES NFR BLD AUTO: 0.2 % (ref 0–0.5)
INTERVENTRICULAR SEPTUM: 1.7 CM (ref 0.6–1.1)
LEFT INTERNAL DIMENSION IN SYSTOLE: 2.2 CM (ref 2.1–4)
LEFT VENTRICLE DIASTOLIC VOLUME INDEX: 21.79 ML/M2
LEFT VENTRICLE DIASTOLIC VOLUME: 40.96 ML
LEFT VENTRICLE MASS INDEX: 77 G/M2
LEFT VENTRICLE SYSTOLIC VOLUME INDEX: 8.6 ML/M2
LEFT VENTRICLE SYSTOLIC VOLUME: 16.2 ML
LEFT VENTRICULAR INTERNAL DIMENSION IN DIASTOLE: 3.2 CM (ref 3.5–6)
LEFT VENTRICULAR MASS: 144.21 G
LV LATERAL E/E' RATIO: 11 M/S
LV SEPTAL E/E' RATIO: 12.83 M/S
LVED V (TEICH): 40.96 ML
LVES V (TEICH): 16.2 ML
LVOT MG: 3 MMHG
LVOT MV: 0.78 CM/S
LYMPHOCYTES # BLD AUTO: 3.2 K/UL (ref 1–4.8)
LYMPHOCYTES NFR BLD: 52.2 % (ref 18–48)
MCH RBC QN AUTO: 28.6 PG (ref 27–31)
MCHC RBC AUTO-ENTMCNC: 33 G/DL (ref 32–36)
MCV RBC AUTO: 87 FL (ref 82–98)
MONOCYTES # BLD AUTO: 0.3 K/UL (ref 0.3–1)
MONOCYTES NFR BLD: 5.6 % (ref 4–15)
MV MEAN GRADIENT: 2 MMHG
MV PEAK A VEL: 0.85 M/S
MV PEAK E VEL: 0.77 M/S
MV PEAK GRADIENT: 4 MMHG
MV STENOSIS PRESSURE HALF TIME: 51 MS
MV VALVE AREA BY CONTINUITY EQUATION: 2.85 CM2
MV VALVE AREA P 1/2 METHOD: 4.31 CM2
NEUTROPHILS # BLD AUTO: 2.5 K/UL (ref 1.8–7.7)
NEUTROPHILS NFR BLD: 40.8 % (ref 38–73)
NRBC BLD-RTO: 0 /100 WBC
PLATELET # BLD AUTO: 257 K/UL (ref 150–450)
PMV BLD AUTO: 10.6 FL (ref 9.2–12.9)
POTASSIUM SERPL-SCNC: 4 MMOL/L (ref 3.5–5.1)
PROT SERPL-MCNC: 6.3 G/DL (ref 6–8.4)
PV MV: 0.9 M/S
PV PEAK GRADIENT: 9 MMHG
PV PEAK VELOCITY: 1.5 M/S
RA PRESSURE ESTIMATED: 3 MMHG
RBC # BLD AUTO: 3.71 M/UL (ref 4–5.4)
SODIUM SERPL-SCNC: 135 MMOL/L (ref 136–145)
TDI LATERAL: 0.07 M/S
TDI SEPTAL: 0.06 M/S
TDI: 0.07 M/S
TRICUSPID ANNULAR PLANE SYSTOLIC EXCURSION: 2 CM
WBC # BLD AUTO: 6.11 K/UL (ref 3.9–12.7)
Z-SCORE OF LEFT VENTRICULAR DIMENSION IN END DIASTOLE: -4.95
Z-SCORE OF LEFT VENTRICULAR DIMENSION IN END SYSTOLE: -3.05

## 2024-06-20 PROCEDURE — 97535 SELF CARE MNGMENT TRAINING: CPT

## 2024-06-20 PROCEDURE — 25000003 PHARM REV CODE 250: Performed by: HOSPITALIST

## 2024-06-20 PROCEDURE — 85025 COMPLETE CBC W/AUTO DIFF WBC: CPT | Performed by: HOSPITALIST

## 2024-06-20 PROCEDURE — 92523 SPEECH SOUND LANG COMPREHEN: CPT

## 2024-06-20 PROCEDURE — 97161 PT EVAL LOW COMPLEX 20 MIN: CPT

## 2024-06-20 PROCEDURE — 92507 TX SP LANG VOICE COMM INDIV: CPT

## 2024-06-20 PROCEDURE — 63600175 PHARM REV CODE 636 W HCPCS: Performed by: HOSPITALIST

## 2024-06-20 PROCEDURE — 36415 COLL VENOUS BLD VENIPUNCTURE: CPT | Performed by: HOSPITALIST

## 2024-06-20 PROCEDURE — 63600175 PHARM REV CODE 636 W HCPCS

## 2024-06-20 PROCEDURE — 80053 COMPREHEN METABOLIC PANEL: CPT | Performed by: HOSPITALIST

## 2024-06-20 PROCEDURE — 21400001 HC TELEMETRY ROOM

## 2024-06-20 RX ORDER — INSULIN PUMP SYRINGE, 3 ML
EACH MISCELLANEOUS
Qty: 1 EACH | Refills: 0 | Status: SHIPPED | OUTPATIENT
Start: 2024-06-20 | End: 2024-06-20

## 2024-06-20 RX ORDER — INSULIN GLARGINE 100 [IU]/ML
20 INJECTION, SOLUTION SUBCUTANEOUS 2 TIMES DAILY
Status: DISCONTINUED | OUTPATIENT
Start: 2024-06-20 | End: 2024-06-21 | Stop reason: HOSPADM

## 2024-06-20 RX ORDER — LANCETS
EACH MISCELLANEOUS
Qty: 120 EACH | Refills: 0 | Status: SHIPPED | OUTPATIENT
Start: 2024-06-20 | End: 2024-06-21

## 2024-06-20 RX ORDER — DEXTROSE 4 G
TABLET,CHEWABLE ORAL
Qty: 1 EACH | Refills: 0 | Status: SHIPPED | OUTPATIENT
Start: 2024-06-20 | End: 2024-06-21

## 2024-06-20 RX ADMIN — HEPARIN SODIUM 5000 UNITS: 5000 INJECTION INTRAVENOUS; SUBCUTANEOUS at 09:06

## 2024-06-20 RX ADMIN — INSULIN ASPART 2 UNITS: 100 INJECTION, SOLUTION INTRAVENOUS; SUBCUTANEOUS at 05:06

## 2024-06-20 RX ADMIN — INSULIN GLARGINE 20 UNITS: 100 INJECTION, SOLUTION SUBCUTANEOUS at 08:06

## 2024-06-20 RX ADMIN — INSULIN ASPART 1 UNITS: 100 INJECTION, SOLUTION INTRAVENOUS; SUBCUTANEOUS at 09:06

## 2024-06-20 RX ADMIN — HEPARIN SODIUM 5000 UNITS: 5000 INJECTION INTRAVENOUS; SUBCUTANEOUS at 02:06

## 2024-06-20 RX ADMIN — ATORVASTATIN CALCIUM 80 MG: 40 TABLET, FILM COATED ORAL at 09:06

## 2024-06-20 RX ADMIN — ASPIRIN 81 MG: 81 TABLET, COATED ORAL at 08:06

## 2024-06-20 RX ADMIN — HEPARIN SODIUM 5000 UNITS: 5000 INJECTION INTRAVENOUS; SUBCUTANEOUS at 06:06

## 2024-06-20 RX ADMIN — INSULIN ASPART 3 UNITS: 100 INJECTION, SOLUTION INTRAVENOUS; SUBCUTANEOUS at 08:06

## 2024-06-20 RX ADMIN — INSULIN GLARGINE 20 UNITS: 100 INJECTION, SOLUTION SUBCUTANEOUS at 09:06

## 2024-06-20 RX ADMIN — INSULIN ASPART 4 UNITS: 100 INJECTION, SOLUTION INTRAVENOUS; SUBCUTANEOUS at 11:06

## 2024-06-20 NOTE — PT/OT/SLP EVAL
Physical Therapy Evaluation    Patient Name:  Meg Lockhart   MRN:  [id number]    Recommendations:     Discharge Recommendations: No Therapy Indicated   Discharge Equipment Recommendations: none   Barriers to discharge: None    Assessment:     Meg Lockhart is a 54 y.o. female admitted with a medical diagnosis of Acute ischemic stroke.  She presents with the following impairments/functional limitations: weakness, impaired endurance, impaired functional mobility, gait instability, impaired balance, impaired cardiopulmonary response to activity.    Pt was found HOB elevated and agreeable to therapy evaluation. Pt required no assistance with bed mobility or transfer to standing with no AD. Pt ambulated 2 x 30 ft with no AD and CGA to reach and return from stairs. Pt ascended/descended 1 flight of stairs with no AD right railing and CGA. Pt usually experiences extreme fatigue after short ambulation distances but showed no signs of shortness of breath. Pt was left HOB elevated with all needs met and  present.     Rehab Prognosis: Fair; patient would benefit from acute skilled PT services to address these deficits and reach maximum level of function.    Recent Surgery: * No surgery found *      Plan:     During this hospitalization, patient to be seen 3 x/week to address the identified rehab impairments via gait training, therapeutic activities, therapeutic exercises, neuromuscular re-education and progress toward the following goals:    Plan of Care Expires:  07/20/24    Subjective     Chief Complaint: Fatigue   Patient/Family Comments/goals: Get better/ go home  Pain/Comfort:  Pain Rating 1: 0/10    Patients cultural, spiritual, Catholic conflicts given the current situation: no    Living Environment:  Pt lives with family in a 2SH with 13 steps to reach second floor. Pt has bedroom on second floor.   Prior to admission, patients level of function was Independent with no AD for short ambulation.   Equipment used at home: none.  DME owned (not currently used): none.  Upon discharge, patient will have assistance from family.    Objective:     Communicated with RN prior to session.  Patient found HOB elevated with blood pressure cuff, peripheral IV, pulse ox (continuous), telemetry  upon PT entry to room.    General Precautions: Standard, fall  Orthopedic Precautions:N/A   Braces: N/A  Respiratory Status: Room air    Exams:  RLE ROM: WFL  RLE Strength: WFL  LLE ROM: WFL  LLE Strength: WFL    Functional Mobility:  Bed Mobility:     Supine to Sit: independence  Sit to Supine: independence  Transfers:     Sit to Stand:  independence with no AD  Gait: ambulated 2 x 30 ft with no AD and CGA/ ascended/descended 1 flight of stairs with no AD right handrail and CGA      AM-PAC 6 CLICK MOBILITY  Total Score:24       Treatment & Education:  Pt was educated on importance of time OOB, POC, safe transfers and ambulation, and use of call button for further assistance.    Patient left HOB elevated with all lines intact, call button in reach, and spouse present.    GOALS:   Multidisciplinary Problems       Physical Therapy Goals          Problem: Physical Therapy    Goal Priority Disciplines Outcome Goal Variances Interventions   Physical Therapy Goal     PT, PT/OT      Description: Goals to be met by: 2024     Patient will increase functional independence with mobility by performin. Gait  x 250 feet with [place]  using no AD.    2. Ascend/descend 18 stairs with no AD right railing and Supervision.                        History:     Past Medical History:   Diagnosis Date    Anxiety     Diabetes mellitus     Elevated cholesterol     GERD (gastroesophageal reflux disease)     Hypertension     PUD (peptic ulcer disease)        Past Surgical History:   Procedure Laterality Date    GASTRIC BYPASS      HYSTERECTOMY         Time Tracking:     PT Received On: 24  PT Start Time: 1001     PT Stop Time: 1014  PT  Total Time (min): 13 min     Billable Minutes: Evaluation 13 06/20/2024

## 2024-06-20 NOTE — ASSESSMENT & PLAN NOTE
CTH: bilateral cerebellar hypodensities concerning for stroke  MRI brain: acute ischemic stroke in bilateral cerebellar hemispheres, mild to moderate WMD  CTA H/N without LVO or significant stenosis  Aspirin 81 mg daily   Atorvastatin 80 mg daily   PT/OT/ST ordered  Echo: EF 65-70%, negative bubble  Neurology consulted  Hypercoagulable panel ordered by neurology: protein C, protein S, phosphatidylserine IgM and IgG, anticardiolipin, beta glycoprotein IgM and IgG, factor 5 Leiden, prothrombin 2 mutation, MTHFR mutation, lipoprotein a, factor 8, homocysteine, antithrombin 3

## 2024-06-20 NOTE — PROGRESS NOTES
"formerly [facility]  Adult Nutrition   Consult Note (Nutrition Education)     SUMMARY     Recommendations  Recommendation/Intervention:   1. Provided patient with education over heart-healthy/cardiac diet and diabetic diet   2. Continue diabetic and cardiac diet as patient tolerates.   3. Monitor BG   4. RD following patient.    Goals: 1. Pt to meet >75% of EEN/EPN by RD follow-up.    Nutrition Goal Status: progressing towards goal    Nutrition Diagnosis PES Statement: Limited adherence to nutrition-related recommendations related to controlling diabetes and blood sugar as evidenced by verbalizing that she has had prior education over a diabetic and heart-healthy diet and is now ready to implement change.     Dietitian Rounds Brief  RD consult for nutrition education over heart-healthy/cardiac diet. Provided patient with cardiac and diabetic diet education. She mentioned that she has had education on both topics. States that she is motivated to make a change in her diet/lifestyle (going to cut-out soft drinks, seasonings with high sodium content, etc.) She had baraitric surgery ~10 years ago and was down to 140 lbs. Has had little appetite, but when she starts to feel hungry she "feels like she is starving." For breakfast she had 1/2 piece of sausage, some potatoes, and some orange juice. No N/V/D, constipation, or abdominal pain.      Nutrition Related Social Determinants of Health: SDOH: None Identified    Malnutrition Assessment: No indications of malnutrition present at this time.          Diet order:   Current Diet Order: Diabetic and cardiac diet            Evaluation of Received Nutrient/Fluid Intake  Tolerance: tolerating     % Intake of Estimated Energy Needs: 50 - 75 %  % Meal Intake: 50 - 75 %      Intake/Output Summary (Last 24 hours) at 6/20/2024 1354  Last data filed at 6/20/2024 0436  Gross per 24 hour   Intake 520 ml   Output --   Net 520 ml        Anthropometrics  Temp: 97.6 °F (36.4 " "°C)  Height Method: Stated  Height: 5' 5" (165.1 cm)  Height (inches): 65 in  Weight Method: Bed Scale  Weight: 79.6 kg (175 lb 7.8 oz)  Weight (lb): 175.49 lb  Ideal Body Weight (IBW), Female: 125 lb  % Ideal Body Weight, Female (lb): 140.39 %  BMI (Calculated): 29.2  BMI Grade: 25 - 29.9 - overweight       Estimated/Assessed Needs  Weight Used For Calorie Calculations: 79.6 kg (175 lb 7.8 oz)  Energy Calorie Requirements (kcal): 1990-2388 kcal (25-30 kcal/kg/day)  Energy Need Method: Kcal/kg  Protein Requirements: 79..65 g (1.0-1.5 g/kg/day)  Weight Used For Protein Calculations: 79.6 kg (175 lb 7.8 oz)  Fluid Requirements (mL): 1990 ml  Estimated Fluid Requirement Method: RDA Method  RDA Method (mL): 1990       Reason for Assessment  Reason For Assessment: consult  Diagnosis: stroke/CVA  Relevant Medical History: DM, elevated cholesterol, GERD, HTN, peptic ulcer disease  Interdisciplinary Rounds: did not attend    Nutrition/Diet History  Patient Reported Diet/Restrictions/Preferences: diabetic diet, heart healthy  Spiritual, Cultural Beliefs, Synagogue Practices, Values that Affect Care: no  Food Allergies: NKFA  Factors Affecting Nutritional Intake: None identified at this time    Nutrition Risk Screen  Nutrition Risk Screen: no indicators present     MST Score: 2  Have you recently lost weight without trying?: Unsure  Weight loss score: 2  Have you been eating poorly because of a decreased appetite?: No  Appetite score: 0       Weight History:  Wt Readings from Last 10 Encounters:   06/20/24 79.6 kg (175 lb 7.8 oz)   06/19/24 80.7 kg (178 lb)   06/02/24 83.9 kg (185 lb)   01/07/24 83.9 kg (185 lb)   11/30/23 84.4 kg (186 lb)   08/08/22 98 kg (216 lb)   06/14/22 96.6 kg (213 lb)   05/04/22 95.3 kg (210 lb)   03/19/22 96.2 kg (212 lb)   03/09/22 96.8 kg (213 lb 6.5 oz)        Lab/Procedures/Meds: Pertinent Labs/Meds Reviewed    Medications:Pertinent Medications Reviewed  Scheduled Meds:   aspirin  81 mg " "Oral Daily    atorvastatin  80 mg Oral Daily    heparin (porcine)  5,000 Units Subcutaneous Q8H    insulin glargine U-100  20 Units Subcutaneous BID     Continuous Infusions:  PRN Meds:.  Current Facility-Administered Medications:     bisacodyL, 10 mg, Rectal, Daily PRN    dextrose 50%, 12.5 g, Intravenous, PRN    dextrose 50%, 25 g, Intravenous, PRN    glucagon (human recombinant), 1 mg, Intramuscular, PRN    glucose, 16 g, Oral, PRN    glucose, 24 g, Oral, PRN    insulin aspart U-100, 0-5 Units, Subcutaneous, QID (AC + HS) PRN    labetalol, 10 mg, Intravenous, Q15 Min PRN    LORazepam, 1 mg, Oral, Daily PRN    ondansetron, 4 mg, Intravenous, Q12H PRN    sodium chloride 0.9%, 500 mL, Intravenous, PRN    sodium chloride 0.9%, 10 mL, Intravenous, PRN    Labs: Pertinent Labs Reviewed  Clinical Chemistry:  Recent Labs   Lab 06/18/24  1551 06/19/24  0546 06/20/24  0437   * 135* 135*   K 3.9 3.8 4.0    104 103   CO2 24 21* 25   * 249* 375*   BUN 16 17 16   CREATININE 1.0 0.9 1.0   CALCIUM 9.0 8.8 9.1   PROT 6.7 6.1 6.3   ALBUMIN 3.7 3.5 3.5   BILITOT 0.3 0.5 0.4   ALKPHOS 74 68 68   AST 17 14 10   ALT 13 11 10   ANIONGAP 9 10 7*   MG 1.7 1.7  --    PHOS 2.9 3.4  --    LIPASE 56  --   --      CBC:   Recent Labs   Lab 06/20/24  0437   WBC 6.11   RBC 3.71*   HGB 10.6*   HCT 32.1*      MCV 87   MCH 28.6   MCHC 33.0     Lipid Panel:  Recent Labs   Lab 06/18/24  2308   CHOL 140   HDL 54   LDLCALC 55.0*   TRIG 155*   CHOLHDL 38.6     Cardiac Profile:  No results for input(s): "BNP", "CPK", "CPKMB", "TROPONINI", "CKTOTAL" in the last 168 hours.  Inflammatory Labs:  No results for input(s): "CRP" in the last 168 hours.  Diabetes:  Recent Labs   Lab 06/18/24  1551   HGBA1C 14.8*     Thyroid & Parathyroid:  Recent Labs   Lab 06/18/24  2308   TSH 2.633       Monitor and Evaluation  Food and Nutrient Intake: energy intake, food and beverage intake  Food and Nutrient Adminstration: diet " order  Knowledge/Beliefs/Attitudes: food and nutrition knowledge/skill, beliefs and attitudes  Physical Activity and Function: nutrition-related ADLs and IADLs  Anthropometric Measurements: weight, weight change, body mass index  Biochemical Data, Medical Tests and Procedures: electrolyte and renal panel, gastrointestinal profile, glucose/endocrine profile, inflammatory profile, lipid profile  Nutrition-Focused Physical Findings: overall appearance     Nutrition Risk  Level of Risk/Frequency of Follow-up: low (1x/week)     Nutrition Follow-Up  RD Follow-up?: Yes      Darlene Lynn, Student Dietitian 06/20/2024 12:49 PM     I certify that I directed the dietetic intern in service delivery and guided them using my skilled judgment. As the cosigning dietitian, I have reviewed the dietetic interns documentation and am responsible for the treatment, assessment, and plan.     JOSE Lyons  06/20/2024 2:37 PM

## 2024-06-20 NOTE — PT/OT/SLP EVAL
Speech Language Pathology Evaluation  Cognitive Communication    Patient Name:  Meg Lockhart   MRN:  [id number]  Admitting Diagnosis: Acute ischemic stroke    Recommendations:     Recommendations:                General Recommendations:  Cognitive-linguistic therapy  Diet recommendations:  Regular, Thin   Aspiration Precautions: Standard aspiration precautions   General Precautions: Standard,    Communication strategies:  none    Assessment:     Meg Lockhart is a 54 y.o. female with an admitting diagnosis of  Acute ischemic stroke. Cognitive communication evaluation completed.She presents with mild cognitive linguistic impairment. Initiate skilled ST to address deficits. OP ST upon DC.      History:   PER HPI: This is a 54-year-old female who has been having vertigo and unsteady gait starting June 2, 2024.  She was seen at a hospital and they were concerned that she may have a stroke but the patient left without getting an MRI.  Her symptoms include vertigo and loss of balance in the lower extremity.  She also says that she feels burning sensation in the lower extremity.  She has a history of hypertension and diabetes.  There are significant history of stroke in her family.  A stroke alert was ordered and an MRI was followed showing an acute bilateral cerebellar infarct.  MRA was also performed which did not show significant occlusion.  Neurology was consulted and recommended inpatient admission.  Patient otherwise awake alert and oriented.  No nausea or vomiting.  Complained of bilateral lower extremity weakness.     Past Medical History:   Diagnosis Date    Anxiety     Diabetes mellitus     Elevated cholesterol     GERD (gastroesophageal reflux disease)     Hypertension     PUD (peptic ulcer disease)        Past Surgical History:   Procedure Laterality Date    GASTRIC BYPASS      HYSTERECTOMY         Social History: Patient lives with spouse/family.    Prior Intubation HX:  NA    Modified Barium  Swallow: NA    Imaging:  Imaging Results              CTA Head and Neck (xpd) (Final result)  Result time 06/18/24 23:54:33      Final result by Myesha Linn MD (06/18/24 23:54:33)                   Impression:      No appreciable acute abnormality on CTA.    No high-grade stenosis or major vessel occlusion.      Electronically signed by: Myesha Linn  Date:    06/18/2024  Time:    23:54               Narrative:    EXAMINATION:  CTA HEAD AND NECK (XPD)    CLINICAL HISTORY:  stroke;    TECHNIQUE:  Non contrast low dose axial images were obtained through the head. CT angiogram was performed from the level of the hattie to the top of the head following the IV administration of 100mL of Omnipaque 350.   Sagittal and coronal reconstructions and maximum intensity projection reconstructions were performed. Arterial stenosis percentages are based on NASCET measurement criteria.    COMPARISON:  MRA and MRI brain 06/18/2024    FINDINGS:  Intracranial Compartment:    Limited imaging demonstrates ventricles and sulci are normal in size for age without evidence of hydrocephalus. No appreciable is extra-axial blood or fluid collections.    The brain parenchyma appears normal. No parenchymal mass, hemorrhage, edema, or major vascular distribution infarct.  Known cerebellar infarcts are not visualized.    Skull/Extracranial Contents (limited evaluation): No fracture. Mastoid air cells and paranasal sinuses are essentially clear.    Non-Vascular Structures of the Neck/Thoracic Inlet (limited evaluation): Normal.    Aorta: Normal 3 vessel arch.    Extracranial carotid circulation: No hemodynamically significant stenosis, aneurysmal dilatation, or dissection.  40% stenosis at the origin of the right internal carotid artery secondary to atherosclerotic calcification.    Extracranial vertebral circulation: No hemodynamically significant stenosis, aneurysmal dilatation, or dissection.    Intracranial Arteries: No focal  high-grade stenosis, occlusion, or aneurysm.    Venous structures (limited evaluation): Normal.                                       MRA Brain without contrast (Final result)  Result time 06/18/24 21:59:25      Final result by Halley Mckeon MD (06/18/24 21:59:25)                   Impression:      No large vessel occlusion.      Electronically signed by: Halley Mckeon  Date:    06/18/2024  Time:    21:59               Narrative:    EXAMINATION:  MRA BRAIN WITHOUT CONTRAST    CLINICAL HISTORY:  concern for stroke; .    TECHNIQUE:  Non-contrast 3-D time-of-flight intracranial MR angiography was performed through the [place] [facility] [place] with MIP reformatting.    COMPARISON:  None.    FINDINGS:  Anterior intracranial circulation: No high-grade focal stenosis, occlusion, aneurysm, or vascular malformation.    Posterior intracranial circulation: No high-grade focal stenosis, occlusion, aneurysm, or vascular malformation.                                       MRI Brain Without Contrast (Final result)  Result time 06/18/24 21:50:15      Final result by Halley Mckeon MD (06/18/24 21:50:15)                   Impression:      Acute bilateral cerebellar infarcts.    At least 20 T2/FLAIR hyperintense foci in the periventricular and deep white matter including the right side of the midbrain and joaquin.  Considerations include chronic microvascular ischemia, demyelinating disease, or migraine headaches.    COMMUNICATION  The critical information above was relayed directly by Halley Mckeon by Epic secure chat (with acknowledgement) to JOI Barone MD and KEVIN Joseph MD on 6/18/2024 at 930 PM      Electronically signed by: Halley Mckeon  Date:    06/18/2024  Time:    21:50               Narrative:    EXAMINATION:  MRI BRAIN WITHOUT CONTRAST    CLINICAL HISTORY:  concern for stroke;.    TECHNIQUE:  Multiplanar multisequence MR imaging of the brain was performed without  "contrast.    COMPARISON:  None    FINDINGS:  Intracranial compartment:    Ventricles and sulci are normal in size for age without evidence of hydrocephalus. No extra-axial blood or fluid collections.    Acute infarcts throughout the bilateral cerebellar hemispheres, right greater than left.  No mass lesion or acute hemorrhage.  At least 20 T2/FLAIR hyperintense foci in the periventricular and deep white matter including the right side of the midbrain and joaquin.    Normal vascular flow voids are preserved.    Skull/extracranial contents (limited evaluation): Bone marrow signal intensity is normal.                                       X-Ray Chest PA And Lateral (Final result)  Result time 06/18/24 20:46:25      Final result by Myseha Linn MD (06/18/24 20:46:25)                   Impression:    [  No acute abnormality.      Electronically signed by: Myesha Linn  Date:    06/18/2024  Time:    20:46               Narrative:    EXAMINATION:  XR CHEST PA AND LATERAL    CLINICAL HISTORY:  Nausea with vomiting, unspecified    TECHNIQUE:  PA and lateral views of the chest were performed.    COMPARISON:  12/30/2019, 08/14/2013 chest x-ray    FINDINGS:  Cardiac silhouette is not enlarged.  Lungs appear clear.  There is no pleural effusion or pneumothorax.  Osseous structures are intact.                                       Prior diet: Regular/thin.    Occupation/hobbies/homemaking: Pt/family report PLOF as independent with ADLS, independent with IADLS. Pt does drive. Level of education is HS and trade school .     Subjective     "They said I had two strokes in the back of the head."  "I've been forgetting stuff since my TIA."    Pain/Comfort:  Pain Rating 1: 0/10  Pain Rating Post-Intervention 2: 0/10    Respiratory Status: Room air    Objective:   Pt seen for cognitive communication evaluation. AAOx4, pleasant and cooperative.  at bedside. Pt reports several "episodes" since beginning of year. She " describes episodes as dizziness, legs giving out, sweating and AMS.     Cognitive Status:  MoCA (Version 8.1) administered with pt achieving a score of 20/30 suggesting mild cognitive-linguistic impairment. Pt earned 4 of 5 points on visuospatial/executive functions, 3 of 3 points on naming, 5 of 6 points for attention, 0 of 3 points for language, 0 of 2 points for abstraction, 2 of 5 points for delayed recall and 6 of 6 points for orientation.    Arousal/Alertness Appropriate response to stimuli  Attention --Difficulty with divided and shared attention. Easily distracted but able to be redirected without difficulty  Perseveration No response  Orientation Oriented x4  Memory Immediate Recall --mildly impaired 2° attention, Delayed--recalled 2/5 unrelated words following 5 minute filled delay, and recall general information --WFL  Problem Solving Solutions --WFL  Simple calculation --mildly impaired       Receptive Language:   Comprehension:      WFL  Questions Simple yes/no --WFL  Complex yes/no --WFL  Commands  One step --WFL  multistep basic commands --WFL  complex/abstract commands --mild difficulty    Pragmatics:    appropriate    Expressive Language:  Verbal:    WFL      Motor Speech:  WFL    Voice:   WFL    Visual-Spatial:  WFL      Treatment: Pt/family educated re: results/recs of evaluation, SLP role. OP ST upon DC, and POC. Receptive to information provided.     Goals:   Multidisciplinary Problems       SLP Goals          Problem: SLP    Goal Priority Disciplines Outcome   SLP Goal     SLP Progressing   Description: 1. Pt will participate in cognitive communication evaluation--MET  2. Complex attention tasks with SPV  3. Further evaluation of planning and organization  4. Functional problem solving tasks related to time and money with SPV                       Plan:   Patient to be seen:  3 x/week   Plan of Care expires:     Plan of Care reviewed with:  patient, spouse   SLP Follow-Up:  Yes       Discharge  recommendations:  Therapy Intensity Recommendations at Discharge: Low Intensity Therapy   Barriers to Discharge:  None    Time Tracking:     SLP Treatment Date:   06/20/24  Speech Start Time:  1037  Speech Stop Time:  1107     Speech Total Time (min):  30 min    Billable Minutes: Eval 22 , Speech Therapy Individual 8, and Total Time 30    06/20/2024

## 2024-06-20 NOTE — PROGRESS NOTES
[facility]  Neurology Progress Note    Patient Name: Meg Lockhart   MRN: [id number]  : 1970  TODAY'S DATE: 2024  ADMIT DATE: 2024  2:45 PM                                          CONSULTED PROVIDER: Asuncion Christianson MD, Neurologist. On-call Phone: [phone]  CONSULT REQUESTED BY: Omar Ahn DO     Chief Complaint   Patient presents with    Dizziness     X 3 days    Vomiting    Fatigue        HPI per EMR:  This is a 54-year-old female who has been having vertigo and unsteady gait starting 2024. She was seen at a hospital and they were concerned that she may have a stroke but the patient left without getting an MRI. Her symptoms include vertigo and loss of balance in the lower extremity. She also says that she feels burning sensation in the lower extremity. She has a history of hypertension and diabetes. There are significant history of stroke in her family. A stroke alert was ordered and an MRI was followed showing an acute bilateral cerebellar infarct. MRA was also performed which did not show significant occlusion. Neurology was consulted and recommended inpatient admission. Patient otherwise awake alert and oriented. No nausea or vomiting. Complained of bilateral lower extremity weakness.     Neurology Consult: Patient was seen and examined by me today. She is a 55 yo woman with history of uncontrolled DM and HLD, who presented to the ED with unsteady gait, dizziness, nausea and vomiting that started on 24. At the time, she presented to the ED but left AMA without undergoing MRI brain. She returned to the ED because the symptoms returned and she was having episodes of severe nausea, vomiting and vertigo. She was not taking antiplatelets for this. On arrival to the ED, MRI brain was obtained and showed acute to subacute strokes in bilateral cerebellar hemispheres, so she was admitted for workup and treatment.    24: Patient was seen and examined by me  today. She denies any new neuro deficits and Is feeling better overall.       Scheduled Meds:   aspirin  81 mg Oral Daily    atorvastatin  80 mg Oral Daily    heparin (porcine)  5,000 Units Subcutaneous Q8H    insulin glargine U-100  20 Units Subcutaneous BID     Continuous Infusions:  PRN Meds:.  Current Facility-Administered Medications:     bisacodyL, 10 mg, Rectal, Daily PRN    dextrose 50%, 12.5 g, Intravenous, PRN    dextrose 50%, 25 g, Intravenous, PRN    glucagon (human recombinant), 1 mg, Intramuscular, PRN    glucose, 16 g, Oral, PRN    glucose, 24 g, Oral, PRN    insulin aspart U-100, 0-5 Units, Subcutaneous, QID (AC + HS) PRN    labetalol, 10 mg, Intravenous, Q15 Min PRN    LORazepam, 1 mg, Oral, Daily PRN    ondansetron, 4 mg, Intravenous, Q12H PRN    sodium chloride 0.9%, 500 mL, Intravenous, PRN    sodium chloride 0.9%, 10 mL, Intravenous, PRN      Physical Exam  Current Vitals:  Vitals:    06/20/24 0750   BP: 98/74   Pulse: 89   Resp: 17   Temp: 98.7 °F (37.1 °C)       Physical Exam:  General: AO x4  HEENT: PERRL, EOMI  CV: RRR  Lungs: no respiratory distress  Abdomen: soft    Neurological Exam    MENTAL STATUS EXAM:  Level of alertness: Alert  Level of attention: Attentive w/out deficit  Orientation/Awareness: intact to person, place, time, situation  Language: fluent. Comprehension/repetition/naming intact    CRANIAL NERVE EXAM:  II/III: fundoscopic exam deferred, PERRL; visual fields full to confrontation  III/IV/VI: EOMI with horizontal and vertical nystagmus  V: no deficits appreciated to light touch  VII: no facial asymmetry noted  VIII: no deficits in hearing bilaterally  IX/X: palate @ ML and raises symmetrically  XI: shoulder shrug 5/5 bilaterally; head turn 5/5 bilaterally  XII: tongue to midline w/out asymmetry  No dysarthria noted on exam.    MOTOR EXAM:  Bulk and Tone: normal throughout  Strength is 5/5 proximally and distally in upper and lower extremities without deficits.  No pronator  drift in bilateral UE. No tremor either at rest or with intention.    REFLEXES:  Absent reflexes in upper and lower extremities.     SENSORY EXAM  Length dependent sensory loss in lower extremities, worse on left foot.    COORDINATION/CEREBELLAR EXAM:  FTN: mild dysmetria and dysdiadochokinesia  HTS: mild ataxia    GAIT:  Deferred for safety.    NIH Stroke Scale      Date: 2024  Time: 1530  Person Administering Scale: Asuncion Christianson MD    Administer stroke scale items in the order listed. Record performance in each category after each subscale exam. Do not go back and change scores. Follow directions provided for each exam technique. Scores should reflect what the patient does, not what the clinician thinks the patient can do. The clinician should record answers while administering the exam and work quickly. Except where indicated, the patient should not be coached (i.e., repeated requests to patient to make a special effort).      1a  Level of consciousness: 0=alert; keenly responsive   1b. LOC questions:  0=Answers both tasks correctly   1c. LOC commands: 0=Answers both tasks correctly   2.  Best Gaze: 0=normal   3.  Visual: 0=No visual loss   4. Facial Palsy: 0=Normal symmetric movement   5a.  Motor left arm: 0=No drift, limb holds 90 (or 45) degrees for full 10 seconds   5b.  Motor right arm: 0=No drift, limb holds 90 (or 45) degrees for full 10 seconds   6a. motor left le=No drift, limb holds 90 (or 45) degrees for full 10 seconds   6b  Motor right le=No drift, limb holds 90 (or 45) degrees for full 10 seconds   7. Limb Ataxia: 2=Present in two limbs   8.  Sensory: 0=Normal; no sensory loss   9. Best Language:  0=No aphasia, normal   10. Dysarthria: 0=Normal   11. Extinction and Inattention: 0=No abnormality    Total:   2       Laboratory Data & Studies    Recent Labs   Lab 24  1551 24  0437   WBC 7.40 6.11   HGB 12.5 10.6*    257   MCV 89 87       Recent Labs   Lab  06/18/24  1551 06/19/24  0546 06/20/24  0437   * 135* 135*   K 3.9 3.8 4.0    104 103   CO2 24 21* 25   BUN 16 17 16   * 249* 375*   CALCIUM 9.0 8.8 9.1   MG 1.7 1.7  --    PHOS 2.9 3.4  --        Recent Labs   Lab 06/18/24  1551 06/19/24  0546 06/20/24  0437   PROT 6.7 6.1 6.3   ALBUMIN 3.7 3.5 3.5   BILITOT 0.3 0.5 0.4   AST 17 14 10   ALT 13 11 10   ALKPHOS 74 68 68       Recent Labs   Lab 06/19/24  0546   INR 0.9   APTT <21.0       Recent Labs   Lab 06/18/24  1551 06/18/24  2308   HGBA1C 14.8*  --    CHOL  --  140   TRIG  --  155*   LDLCALC  --  55.0*   HDL  --  54   TSH  --  2.633       Microbiology:  Microbiology Results (last 7 days)       Procedure Component Value Units Date/Time    Urine culture [[id number]] Collected: 06/18/24 1607    Order Status: Completed Specimen: Urine Updated: 06/20/24 0736     Urine Culture, Routine Multiple organisms isolated. None in predominance.  Repeat if      clinically necessary.    Narrative:      Specimen Source->Urine            Imaging:  CTA Head and Neck (xpd)    Result Date: 6/18/2024  EXAMINATION: CTA HEAD AND NECK (XPD) CLINICAL HISTORY: stroke; TECHNIQUE: Non contrast low dose axial images were obtained through the head. CT angiogram was performed from the level of the hattie to the top of the head following the IV administration of 100mL of Omnipaque 350.   Sagittal and coronal reconstructions and maximum intensity projection reconstructions were performed. Arterial stenosis percentages are based on NASCET measurement criteria. COMPARISON: MRA and MRI brain 06/18/2024 FINDINGS: Intracranial Compartment: Limited imaging demonstrates ventricles and sulci are normal in size for age without evidence of hydrocephalus. No appreciable is extra-axial blood or fluid collections. The brain parenchyma appears normal. No parenchymal mass, hemorrhage, edema, or major vascular distribution infarct.  Known cerebellar infarcts are not visualized.  Skull/Extracranial Contents (limited evaluation): No fracture. Mastoid air cells and paranasal sinuses are essentially clear. Non-Vascular Structures of the Neck/Thoracic Inlet (limited evaluation): Normal. Aorta: Normal 3 vessel arch. Extracranial carotid circulation: No hemodynamically significant stenosis, aneurysmal dilatation, or dissection.  40% stenosis at the origin of the right internal carotid artery secondary to atherosclerotic calcification. Extracranial vertebral circulation: No hemodynamically significant stenosis, aneurysmal dilatation, or dissection. Intracranial Arteries: No focal high-grade stenosis, occlusion, or aneurysm. Venous structures (limited evaluation): Normal.     No appreciable acute abnormality on CTA. No high-grade stenosis or major vessel occlusion. Electronically signed by: Myesha Linn Date:    06/18/2024 Time:    23:54    MRA Brain without contrast    Result Date: 6/18/2024  EXAMINATION: MRA BRAIN WITHOUT CONTRAST CLINICAL HISTORY: concern for stroke; . TECHNIQUE: Non-contrast 3-D time-of-flight intracranial MR angiography was performed through the [place] with MIP reformatting. COMPARISON: None. FINDINGS: Anterior intracranial circulation: No high-grade focal stenosis, occlusion, aneurysm, or vascular malformation. Posterior intracranial circulation: No high-grade focal stenosis, occlusion, aneurysm, or vascular malformation.     No large vessel occlusion. Electronically signed by: Halley Mckeon Date:    06/18/2024 Time:    21:59    MRI Brain Without Contrast    Result Date: 6/18/2024  EXAMINATION: MRI BRAIN WITHOUT CONTRAST CLINICAL HISTORY: concern for stroke;. TECHNIQUE: Multiplanar multisequence MR imaging of the brain was performed without contrast. COMPARISON: None FINDINGS: Intracranial compartment: Ventricles and sulci are normal in size for age without evidence of hydrocephalus. No extra-axial blood or fluid collections. Acute infarcts throughout the  bilateral cerebellar hemispheres, right greater than left.  No mass lesion or acute hemorrhage.  At least 20 T2/FLAIR hyperintense foci in the periventricular and deep white matter including the right side of the midbrain and joaquin. Normal vascular flow voids are preserved. Skull/extracranial contents (limited evaluation): Bone marrow signal intensity is normal.     Acute bilateral cerebellar infarcts. At least 20 T2/FLAIR hyperintense foci in the periventricular and deep white matter including the right side of the midbrain and joaquin.  Considerations include chronic microvascular ischemia, demyelinating disease, or migraine headaches. COMMUNICATION The critical information above was relayed directly by Halley Mckeon by Epic secure chat (with acknowledgement) to JOI Barone MD and KEVIN Joseph MD on 6/18/2024 at 930 PM Electronically signed by: Halley Mckeon Date:    06/18/2024 Time:    21:50    X-Ray Chest PA And Lateral    Result Date: 6/18/2024  EXAMINATION: XR CHEST PA AND LATERAL CLINICAL HISTORY: Nausea with vomiting, unspecified TECHNIQUE: PA and lateral views of the chest were performed. COMPARISON: 12/30/2019, 08/14/2013 chest x-ray FINDINGS: Cardiac silhouette is not enlarged.  Lungs appear clear.  There is no pleural effusion or pneumothorax.  Osseous structures are intact.     [ No acute abnormality. Electronically signed by: Myesha Linn Date:    06/18/2024 Time:    20:46    CT Head Without Contrast    Result Date: 6/2/2024  EXAMINATION: CT HEAD WITHOUT CONTRAST CLINICAL HISTORY: Neuro deficit, acute, stroke suspected; TECHNIQUE: Low dose axial images were obtained through the head.  Coronal and sagittal reformations were also performed. Contrast was not administered. COMPARISON: 06/14/2021 FINDINGS: Normal size of the ventricular system.    No mass, hemorrhage or acute major vascular distribution infarct. No mass effect or midline shift. Included orbits are unremarkable. Paranasal sinuses and  mastoid air cells are clear. Atherosclerosis.  No acute osseous abnormality.     No intracranial hemorrhage.  No evidence for recent ischemia, noting however that MRI could add sensitivity in an acute setting. Electronically signed by: Samir Underwood Date:    06/02/2024 Time:    10:59      Assessment:    Acute ischemic stroke in bilateral cerebellar hemispheres - etiology ongoing  53 yo woman with history of uncontrolled DM and HLD, who presented to the ED with unsteady gait, dizziness, nausea and vomiting that started on 6/2/24. At the time, she presented to the ED but left AMA without undergoing MRI brain, but returned for evaluation of persistent symptoms. MRI brain showed acute to subacute strokes in bilateral cerebellar hemispheres, so she was admitted for workup and treatment.    Stroke workup:  CTH: bilateral cerebellar hypodensities concerning for stroke  CTA head and neck: no LVO, left vertebral atherosclerosis, fenestrated basilar artery  MRI brain: acute ischemic stroke in bilateral cerebellar hemispheres, mild to moderate WMD  Risk factors: A1C 14.8, TSH 2.633, LDL 55  Echocardiogram: normal LVSF with EF 65-70%, no PFO, no LAE    Plan:  - Admitted to hospital medicine with q4 hour neuro checks, on telemetry, continuous pulse oximetry  - Diet after eval per SLP.  - Secondary stroke prevention with ASA 81 mg daily, atorvastatin 80 mg daily  - Risk factor stratification with HgbA1C, Fasting Lipid profile, TSH  - Ordered hypercoagulable panel in young patient with bilateral cerebellar strokes including protein C, protein S, phosphatidylserine IgM and IgG, anticardiolipin, beta glycoprotein IgM and IgG, factor 5 Leiden, prothrombin 2 mutation, MTHFR mutation, lipoprotein a, factor 8, homocysteine, antithrombin 3  - 2D echocardiogram as above  - Maintain Euthermia with Tylenol prn temp > 37.2 degrees C.  - Assessment for rehab with PT/OT/SLP evaluation and treatment.  - Permissive HTN systolic BP goal up to  220, diastolic up to 110 for 24H, then lower BP slowly to normotension  - Cardiac enzymes and EKG   - Patient will need outpatient follow up with cardiology for loop recorder or cardiac monitor to rule out paroxysmal Afib as a possible cause for stroke  - Will follow peripherally. Please call with questions, concerns or neurological decline.      DVT prophylaxis with chemo/SCD prophylaxis      [phone] 7 days from discharge.     Stroke education was provided including stroke risk factors modification and any acute neurological changes including weakness, confusion, visual changes to come straight to the ER.     All questions were answered.                              Thank you kindly for including us in the care of this patient. Please do not hesitate to contact us with any questions.    52 minutes of care time has been spent evaluating with the patient. Time includes chart review not limited to diagnostic imaging, labs, and vitals, patient assessment, discussion with family and nursing, current order evaluations, and new order entries.      Asuncion Christianson MD  Neurology

## 2024-06-20 NOTE — PT/OT/SLP PROGRESS
Occupational Therapy   Treatment/Discharge    Name: Meg Lockhart  MRN: [id number]  Admitting Diagnosis:  Acute ischemic stroke       Recommendations:     Discharge Recommendations: No Therapy Indicated  Discharge Equipment Recommendations:  none  Barriers to discharge:  None    Assessment:     Meg Lockhart is a 54 y.o. female with a medical diagnosis of Acute ischemic stroke.  She presents with improved medical acuity and functional mobility. Patient participated in bed mobility, LB dressing sitting EOB, toilet transfer, grooming standing at the sink and ambulation using no AD. Performance deficits affecting function are weakness, impaired endurance, impaired self care skills, impaired functional mobility, gait instability, impaired balance.     Rehab Prognosis:  Fair; patient would benefit from acute skilled OT services to address these deficits and reach maximum level of function.       Plan:     Patient to be seen 3 x/week to address the above listed problems via self-care/home management, therapeutic activities, therapeutic exercises  Plan of Care Expires: 07/19/24  Plan of Care Reviewed with: patient, spouse    Subjective     Chief Complaint: None  Patient/Family Comments/goals: To go home.  Pain/Comfort:  Pain Rating 1: 0/10  Pain Rating Post-Intervention 1: 0/10    Objective:     Communicated with: nurse prior to session.  Patient found HOB elevated with telemetry, peripheral IV upon OT entry to room.    General Precautions: Standard,  (None)    Orthopedic Precautions:N/A  Braces: N/A  Respiratory Status: Room air     Occupational Performance:     Bed Mobility:    Patient completed Scooting/Bridging with independence  Patient completed Supine to Sit with independence  Patient completed Sit to Supine with independence     Functional Mobility/Transfers:  Patient completed Sit <> Stand Transfer with independence  with  no assistive device   Patient completed Toilet Transfer Step Transfer technique  with independence with  no AD  Functional Mobility: ambulated 25 feet in the hospital room and bathroom independently using no AD.    Activities of Daily Living:  Grooming: independence to wash hands standing at sink.  Lower Body Dressing: independence to don/doff socks sitting EOB.      [facility] 6 Click ADL: 24    Treatment & Education:  Patient is currently independent with sitting/standing ADL activity with no safety concerns.    Patient left HOB elevated with all lines intact and call button in reach    GOALS:   Multidisciplinary Problems       Occupational Therapy Goals       Not on file              Multidisciplinary Problems (Resolved)          Problem: Occupational Therapy    Goal Priority Disciplines Outcome Interventions   Occupational Therapy Goal   (Resolved)     OT, PT/OT Met    Description: Goals to be met by: 07/19/2024     Patient will increase functional independence with ADLs by performing:    Grooming while standing with Braxton.  Toileting from toilet with Braxton for hygiene and clothing management.   Toilet transfer to bedside commode with Braxton.                         Time Tracking:     OT Date of Treatment: 06/20/24  OT Start Time: 1106  OT Stop Time: 1120  OT Total Time (min): 14 min    Billable Minutes:Self Care/Home Management 14    OT/RIDGE: OT          6/20/2024

## 2024-06-20 NOTE — PLAN OF CARE
[facility]  Initial Discharge Assessment       Primary Care Provider: Drew Campuzano MD    Admission Diagnosis: Cerebellar stroke, acute [I63.9]    Admission Date: 6/18/2024  Expected Discharge Date: 6/20/2024     completed discharge assessment with pt and pt's  at bedside. Pt AAOx4s. Pt lives at home with her adult children and spouse. Demographics, PCP, and insurance verified. No home health. No dialysis. Pt completes ADLs without assistance. Pt to discharge home via family transport. Pt has no other needs to be addressed at this time.          Payor: BLUE CROSS BLUE SHIELD / Plan: BCBS ALL OUT OF STATE / Product Type: PPO /     Extended Emergency Contact Information  Primary Emergency Contact: Junior Lockhart  Address: [address] [place] [address] [facility]  Home Phone: [phone]  Relation: Spouse   needed? No    Discharge Plan A: Home with family  Discharge Plan B: Home with family      [facility]  [address]  [facility] [address]  Phone: [phone] Fax: [phone]    [id number] - [place], [place] - [address]  [address]  [facility] [address]  Phone: [phone] Fax: [phone]      Initial Assessment (most recent)       Adult Discharge Assessment - 06/20/24 1125          Discharge Assessment    Assessment Type Discharge Planning Assessment     Confirmed/corrected address, phone number and insurance Yes     Confirmed Demographics Correct on Facesheet     Source of Information patient     When was your last doctors appointment? 06/13/24     Reason For Admission Acute ischemic stroke     People in Home significant other;child(andreia), adult     Facility Arrived From: Home     Do you expect to return to your current living situation? Yes     Do you have help at home or someone to help you manage your care at home? Yes     Who are your caregiver(s) and their phone number(s)? Junior Lockhart  (Spouse( [phone]     Prior to hospitilization cognitive status: Alert/Oriented     Current cognitive status: Alert/Oriented     Walking or Climbing Stairs Difficulty no     Dressing/Bathing Difficulty no     Home Accessibility stairs within home     Home Layout Bathroom on 2nd floor     Readmission within 30 days? No     Patient currently being followed by outpatient case management? No     Do you currently have service(s) that help you manage your care at home? No     Do you take prescription medications? Yes     Do you have prescription coverage? Yes     Coverage [facility] ALL OUT OF STATE     Do you have any problems affording any of your prescribed medications? No     Is the patient taking medications as prescribed? yes     How do you get to doctors appointments? car, drives self     Are you on dialysis? No     Do you take coumadin? No     Discharge Plan A Home with family     Discharge Plan B Home with family     DME Needed Upon Discharge  none     Discharge Plan discussed with: Spouse/sig other;Patient

## 2024-06-20 NOTE — ASSESSMENT & PLAN NOTE
Admit using the stroke protocol  Permissive hypertension   Aspirin 81 mg daily   Atorvastatin 80 mg daily   PT/OT  Passed SLP and diet ordered  Echocardiogram   Neurology consulted  Serial neuro check

## 2024-06-20 NOTE — SUBJECTIVE & OBJECTIVE
"Interval History: Patient seen and examined.  NAD.  Reports she "feels like she is 24 years old again".  Blood glucose elevated, lantus added BID.  Reports she also had orange juice this morning, discussed importance of diabetic diet.  PT/OT/ST ordered, will arrange outpatient therapy.    Review of Systems   Respiratory:  Negative for chest tightness, shortness of breath and wheezing.    Gastrointestinal:  Negative for abdominal pain, nausea and vomiting.     Objective:     Vital Signs (Most Recent):  Temp: 97.6 °F (36.4 °C) (06/20/24 1055)  Pulse: 84 (06/20/24 1055)  Resp: 17 (06/20/24 1055)  BP: (!) 144/99 (06/20/24 1055)  SpO2: 98 % (06/20/24 1055) Vital Signs (24h Range):  Temp:  [97.6 °F (36.4 °C)-98.7 °F (37.1 °C)] 97.6 °F (36.4 °C)  Pulse:  [84-98] 84  Resp:  [17-21] 17  SpO2:  [96 %-100 %] 98 %  BP: ()/(74-99) 144/99     Weight: 79.6 kg (175 lb 7.8 oz)  Body mass index is 29.2 kg/m².    Intake/Output Summary (Last 24 hours) at 6/20/2024 1445  Last data filed at 6/20/2024 0436  Gross per 24 hour   Intake 520 ml   Output --   Net 520 ml         Physical Exam  Vitals and nursing note reviewed.   Constitutional:       General: She is not in acute distress.     Appearance: Normal appearance.   HENT:      Head: Normocephalic.      Mouth/Throat:      Mouth: Mucous membranes are moist.      Pharynx: Oropharynx is clear.   Eyes:      Pupils: Pupils are equal, round, and reactive to light.   Cardiovascular:      Rate and Rhythm: Normal rate and regular rhythm.   Pulmonary:      Effort: Pulmonary effort is normal. No respiratory distress.      Breath sounds: Normal breath sounds.   Abdominal:      General: Bowel sounds are normal. There is no distension.      Palpations: Abdomen is soft.      Tenderness: There is no abdominal tenderness.   Neurological:      General: No focal deficit present.      Mental Status: She is alert and oriented to person, place, and time.      Sensory: Sensory deficit (top of left foot) " present.   Psychiatric:         Mood and Affect: Mood normal.             Significant Labs: All pertinent labs within the past 24 hours have been reviewed.  Recent Lab Results  (Last 5 results in the past 24 hours)        06/20/24  1103   06/20/24  0753   06/20/24  0437   06/19/24  2330   06/19/24  2110        Albumin     3.5           ALP     68           ALT     10           Anion Gap     7           AST     10           Baso #     0.01           Basophil %     0.2           BILIRUBIN TOTAL     0.4  Comment: For infants and newborns, interpretation of results should be based  on gestational age, weight and in agreement with clinical  observations.    Premature Infant recommended reference ranges:  Up to 24 hours.............<8.0 mg/dL  Up to 48 hours............<12.0 mg/dL  3-5 days..................<15.0 mg/dL  6-29 days.................<15.0 mg/dL             BUN     16           Calcium     9.1           Chloride     103           CO2     25           Creatinine     1.0           Differential Method     Automated           eGFR     >60.0           Eos #     0.1           Eos %     1.0           Glucose     375           Gran # (ANC)     2.5           Gran %     40.8           Hematocrit     32.1           Hemoglobin     10.6           Immature Grans (Abs)     0.01  Comment: Mild elevation in immature granulocytes is non specific and   can be seen in a variety of conditions including stress response,   acute inflammation, trauma and pregnancy. Correlation with other   laboratory and clinical findings is essential.             Immature Granulocytes     0.2           Lymph #     3.2           Lymph %     52.2           MCH     28.6           MCHC     33.0           MCV     87           Mono #     0.3           Mono %     5.6           MPV     10.6           nRBC     0           Platelet Count     257           POC Glucose 321   289     347   449       Potassium     4.0           PROTEIN TOTAL     6.3           RBC      3.71           RDW     12.4           Sodium     135           WBC     6.11                                  Significant Imaging: I have reviewed all pertinent imaging results/findings within the past 24 hours.

## 2024-06-20 NOTE — ASSESSMENT & PLAN NOTE
Reports she has only been taking metformin outpatient but has been on insulin and other injectables in the past  Taken off for unknown reason  Lantus ordered BID  SSI  Accuchecks before meals and at bedtime  Endocrinology referral outpatient

## 2024-06-20 NOTE — NURSING
Nurses Note -- 4 Eyes      Skin assessed during: Admit      [x] No Altered Skin Integrity Present    [x]Prevention Measures Documented      [] Yes- Altered Skin Integrity Present or Discovered   [] LDA Added if Not in Epic (Describe Wound)   [] New Altered Skin Integrity was Present on Admit and Documented in LDA   [] Wound Image Taken    Wound Care Consulted? No    Attending Nurse:  Nydia Montague RN/Staff Member:   [id number]

## 2024-06-20 NOTE — PLAN OF CARE
0928: spoke with the pt and her  at the bedside; pt requesting new PCP and endocrinologist. Entered referral for Endocrinology and scheduled her an appt with new PCP.   Patient's  is at the bedside and will be bringing her home at the time of discharge anticipated for today.

## 2024-06-20 NOTE — HOSPITAL COURSE
54-year-old female was admitted with CVA.  Stroke protocol was initiated.  Neurology was consulted.  CT of head showed bilateral cerebellar hypodensities concerning for stroke.  MRI of brain showed acute ischemic stroke in bilateral cerebellar hemispheres, mild to moderate WMD.  CTA of H/N without LVO or significant stenosis.  Echo: EF 65-70%, negative bubble.  MRA of brain without significant stenosis or LVO.  She was started on aspirin and high-intensity statin.  Hypercoagulable panel was ordered by neurology: protein C, protein S, phosphatidylserine IgM and IgG, anticardiolipin, beta glycoprotein IgM and IgG, factor 5 Leiden, prothrombin 2 mutation, MTHFR mutation, lipoprotein a, factor 8, homocysteine, antithrombin 3 and is currently pending.  She was evaluated by PT and OT and no therapy was recommended.  She has no focal weakness.  She was also evaluated by speech therapy and was found to have mild cognitive linguistic impairment, outpatient speech therapy was recommended.  Outpatient PT, OT, and ST were ordered.  Patient was hyperglycemic on arrival.  Hemoglobin A1c was 14.8.  Patient reports that she has been only taking metformin, but she had taken many other injectables and insulin in the past, unsure why she was taken off of these.  She was started on Lantus twice a day and short-acting sliding scale insulin while inpatient with good blood sugar control.  Patient reports that she lost her glucometer.  New glucometer was ordered with strips and lancets.  Discussed importance of blood sugar control and checking blood sugar before meals and at bedtime.  Registered dietitian saw and evaluated patient, gave instructions regarding diabetic diet.  Referral was sent for endocrinology outpatient.  Patient also requesting new PCP which was arranged by case management.  Neurology recommended outpatient cardiology follow up for an outpatient Holter monitor to assess for arrhythmias.  Patient was monitored on  telemetry throughout hospitalization and remained normal sinus rhythm.  Referral placed for cardiology follow up outpatient.  Patient was discussed with Dr. Franco Christianson, patient to follow up with her outpatient in 1 week.  Plan of care was discussed with patient and verbalized understanding.  All questions and concerns addressed.  Patient was seen and examined on day of discharge.

## 2024-06-20 NOTE — PLAN OF CARE
Problem: SLP  Goal: SLP Goal  Description: 1. Pt will participate in cognitive communication evaluation--MET  2. Complex attention tasks with SPV  3. Further evaluation of planning and organization  4. Functional problem solving tasks related to time and money with SPV  Outcome: Progressing    Meg Lockhart is a 54 y.o. female with an admitting diagnosis of  Acute ischemic stroke. Cognitive communication evaluation completed.She presents with mild cognitive linguistic impairment. Initiate skilled ST to address deficits. OP ST upon DC.

## 2024-06-20 NOTE — SUBJECTIVE & OBJECTIVE
Interval History: no issues voiced.     Review of Systems   All other systems reviewed and are negative.    Objective:     Vital Signs (Most Recent):  Temp: 97.6 °F (36.4 °C) (06/20/24 1055)  Pulse: 84 (06/20/24 1055)  Resp: 17 (06/20/24 1055)  BP: (!) 144/99 (06/20/24 1055)  SpO2: 98 % (06/20/24 1055) Vital Signs (24h Range):  Temp:  [97.6 °F (36.4 °C)-98.7 °F (37.1 °C)] 97.6 °F (36.4 °C)  Pulse:  [84-98] 84  Resp:  [17-21] 17  SpO2:  [96 %-100 %] 98 %  BP: ()/(74-99) 144/99     Weight: 79.6 kg (175 lb 7.8 oz)  Body mass index is 29.2 kg/m².    Intake/Output Summary (Last 24 hours) at 6/20/2024 1143  Last data filed at 6/20/2024 0436  Gross per 24 hour   Intake 520 ml   Output --   Net 520 ml         Physical Exam  Vitals and nursing note reviewed.   Constitutional:       Appearance: She is well-developed.   HENT:      Head: Normocephalic and atraumatic.      Nose: Nose normal.   Eyes:      Conjunctiva/sclera: Conjunctivae normal.   Cardiovascular:      Rate and Rhythm: Normal rate and regular rhythm.      Heart sounds: Normal heart sounds.   Pulmonary:      Effort: Pulmonary effort is normal.      Breath sounds: Normal breath sounds. No wheezing.   Abdominal:      General: Bowel sounds are normal.      Palpations: Abdomen is soft. There is no mass.      Tenderness: There is no abdominal tenderness. There is no guarding or rebound.   Musculoskeletal:         General: No tenderness. Normal range of motion.      Cervical back: Normal range of motion and neck supple.   Skin:     General: Skin is warm.      Findings: No rash.   Neurological:      Mental Status: She is alert and oriented to person, place, and time.      Cranial Nerves: No cranial nerve deficit.   Psychiatric:         Behavior: Behavior normal.         Thought Content: Thought content normal.             Significant Labs: All pertinent labs within the past 24 hours have been reviewed.  BMP:   Recent Labs   Lab 06/19/24  0546 06/20/24  0437   GLU  "249* 375*   * 135*   K 3.8 4.0    103   CO2 21* 25   BUN 17 16   CREATININE 0.9 1.0   CALCIUM 8.8 9.1   MG 1.7  --      CBC:   Recent Labs   Lab 06/18/24  1551 06/20/24  0437   WBC 7.40 6.11   HGB 12.5 10.6*   HCT 38.5 32.1*    257     Lipid Panel:   Recent Labs   Lab 06/18/24  2308   CHOL 140   HDL 54   LDLCALC 55.0*   TRIG 155*   CHOLHDL 38.6     Troponin: No results for input(s): "TROPONINI", "TROPONINIHS" in the last 48 hours.    Significant Imaging: I have reviewed all pertinent imaging results/findings within the past 24 hours.  I have reviewed and interpreted all pertinent imaging results/findings within the past 24 hours.  "

## 2024-06-20 NOTE — ASSESSMENT & PLAN NOTE
CTH: bilateral cerebellar hypodensities concerning for stroke  MRI brain: acute ischemic stroke in bilateral cerebellar hemispheres, mild to moderate WMD  CTA H/N without LVO or significant stenosis  Aspirin 81 mg daily   Atorvastatin 80 mg daily   PT/OT/ST ordered  Echo negative bubble  Neurology consulted  Hypercoagulable panel ordered by neurology: protein C, protein S, phosphatidylserine IgM and IgG, anticardiolipin, beta glycoprotein IgM and IgG, factor 5 Leiden, prothrombin 2 mutation, MTHFR mutation, lipoprotein a, factor 8, homocysteine, antithrombin 3

## 2024-06-20 NOTE — PROGRESS NOTES
[facility]  Progress Note    Patient Name: Meg Lockhart  MRN: [id number]  Patient Class: IP- Inpatient   Admission Date: 6/18/2024  Length of Stay: 2 days  Attending Physician: Omar Ahn DO  Primary Care Provider: Drew Campuzano MD        Subjective:     Principal Problem:Acute ischemic stroke        HPI:  This is a 54-year-old female who has been having vertigo and unsteady gait starting June 2, 2024.  She was seen at a hospital and they were concerned that she may have a stroke but the patient left without getting an MRI.  Her symptoms include vertigo and loss of balance in the lower extremity.  She also says that she feels burning sensation in the lower extremity.  She has a history of hypertension and diabetes.  There are significant history of stroke in her family.  A stroke alert was ordered and an MRI was followed showing an acute bilateral cerebellar infarct.  MRA was also performed which did not show significant occlusion.  Neurology was consulted and recommended inpatient admission.  Patient otherwise awake alert and oriented.  No nausea or vomiting.  Complained of bilateral lower extremity weakness.    Overview/Hospital Course:  Pt admitted for CVA. MRI brain was obtained and showed acute to subacute strokes in bilateral cerebellar hemispheres, so she was admitted for workup and treatment. Passed SLP eval. Diet ordered. Cont with asa and statin. Neuro consulted. PT/Ot ordered.     Interval History: no issues voiced.     Review of Systems   All other systems reviewed and are negative.    Objective:     Vital Signs (Most Recent):  Temp: 97.6 °F (36.4 °C) (06/20/24 1055)  Pulse: 84 (06/20/24 1055)  Resp: 17 (06/20/24 1055)  BP: (!) 144/99 (06/20/24 1055)  SpO2: 98 % (06/20/24 1055) Vital Signs (24h Range):  Temp:  [97.6 °F (36.4 °C)-98.7 °F (37.1 °C)] 97.6 °F (36.4 °C)  Pulse:  [84-98] 84  Resp:  [17-21] 17  SpO2:  [96 %-100 %] 98 %  BP: ()/(74-99) 144/99  "    Weight: 79.6 kg (175 lb 7.8 oz)  Body mass index is 29.2 kg/m².    Intake/Output Summary (Last 24 hours) at 6/20/2024 1143  Last data filed at 6/20/2024 0436  Gross per 24 hour   Intake 520 ml   Output --   Net 520 ml         Physical Exam  Vitals and nursing note reviewed.   Constitutional:       Appearance: She is well-developed.   HENT:      Head: Normocephalic and atraumatic.      Nose: Nose normal.   Eyes:      Conjunctiva/sclera: Conjunctivae normal.   Cardiovascular:      Rate and Rhythm: Normal rate and regular rhythm.      Heart sounds: Normal heart sounds.   Pulmonary:      Effort: Pulmonary effort is normal.      Breath sounds: Normal breath sounds. No wheezing.   Abdominal:      General: Bowel sounds are normal.      Palpations: Abdomen is soft. There is no mass.      Tenderness: There is no abdominal tenderness. There is no guarding or rebound.   Musculoskeletal:         General: No tenderness. Normal range of motion.      Cervical back: Normal range of motion and neck supple.   Skin:     General: Skin is warm.      Findings: No rash.   Neurological:      Mental Status: She is alert and oriented to person, place, and time.      Cranial Nerves: No cranial nerve deficit.   Psychiatric:         Behavior: Behavior normal.         Thought Content: Thought content normal.             Significant Labs: All pertinent labs within the past 24 hours have been reviewed.  BMP:   Recent Labs   Lab 06/19/24  0546 06/20/24  0437   * 375*   * 135*   K 3.8 4.0    103   CO2 21* 25   BUN 17 16   CREATININE 0.9 1.0   CALCIUM 8.8 9.1   MG 1.7  --      CBC:   Recent Labs   Lab 06/18/24  1551 06/20/24  0437   WBC 7.40 6.11   HGB 12.5 10.6*   HCT 38.5 32.1*    257     Lipid Panel:   Recent Labs   Lab 06/18/24  2308   CHOL 140   HDL 54   LDLCALC 55.0*   TRIG 155*   CHOLHDL 38.6     Troponin: No results for input(s): "TROPONINI", "TROPONINIHS" in the last 48 hours.    Significant Imaging: I have " reviewed all pertinent imaging results/findings within the past 24 hours.  I have reviewed and interpreted all pertinent imaging results/findings within the past 24 hours.    Assessment/Plan:      * Acute ischemic stroke  Admit using the stroke protocol  Permissive hypertension   Aspirin 81 mg daily   Atorvastatin 80 mg daily   PT/OT  Passed SLP and diet ordered  Echocardiogram   Neurology consulted  Serial neuro check      Hypertension  Hold oral medication   Permissive hypertension      Diabetes mellitus without complication  Hold oral medication   Insulin sliding scale   We will need diabetic education      VTE Risk Mitigation (From admission, onward)           Ordered     heparin (porcine) injection 5,000 Units  Every 8 hours         06/18/24 2236     IP VTE HIGH RISK PATIENT  Once         06/18/24 2236     Place sequential compression device  Until discontinued         06/18/24 2236                    Discharge Planning   TINA: 6/20/2024     Code Status: Full Code   Is the patient medically ready for discharge?:     Reason for patient still in hospital (select all that apply): Treatment  Discharge Plan A: Home with family                  Albertina Joseph MD  Department of Hospital Medicine   [facility]

## 2024-06-20 NOTE — PROGRESS NOTES
"[facility]  Progress Note    Patient Name: Meg Lockhart  MRN: [id number]  Patient Class: IP- Inpatient   Admission Date: 6/18/2024  Length of Stay: 2 days  Attending Physician: Omar Ahn DO  Primary Care Provider: Katya Gonzalez PA-C        Subjective:     Principal Problem:Acute ischemic stroke        HPI:  This is a 54-year-old female who has been having vertigo and unsteady gait starting June 2, 2024.  She was seen at a hospital and they were concerned that she may have a stroke but the patient left without getting an MRI.  Her symptoms include vertigo and loss of balance in the lower extremity.  She also says that she feels burning sensation in the lower extremity.  She has a history of hypertension and diabetes.  There are significant history of stroke in her family.  A stroke alert was ordered and an MRI was followed showing an acute bilateral cerebellar infarct.  MRA was also performed which did not show significant occlusion.  Neurology was consulted and recommended inpatient admission.  Patient otherwise awake alert and oriented.  No nausea or vomiting.  Complained of bilateral lower extremity weakness.    Overview/Hospital Course:  Pt admitted for CVA. MRI brain was obtained and showed acute to subacute strokes in bilateral cerebellar hemispheres, so she was admitted for workup and treatment. Passed SLP eval. Diet ordered. Cont with asa and statin. Neuro consulted. PT/Ot ordered.     Interval History: Patient seen and examined.  NAD.  Reports she "feels like she is 24 years old again".  Blood glucose elevated, lantus added BID.  Reports she also had orange juice this morning, discussed importance of diabetic diet.  PT/OT/ST ordered, will arrange outpatient therapy.    Review of Systems   Respiratory:  Negative for chest tightness, shortness of breath and wheezing.    Gastrointestinal:  Negative for abdominal pain, nausea and vomiting.     Objective:     Vital " Signs (Most Recent):  Temp: 97.6 °F (36.4 °C) (06/20/24 1055)  Pulse: 84 (06/20/24 1055)  Resp: 17 (06/20/24 1055)  BP: (!) 144/99 (06/20/24 1055)  SpO2: 98 % (06/20/24 1055) Vital Signs (24h Range):  Temp:  [97.6 °F (36.4 °C)-98.7 °F (37.1 °C)] 97.6 °F (36.4 °C)  Pulse:  [84-98] 84  Resp:  [17-21] 17  SpO2:  [96 %-100 %] 98 %  BP: ()/(74-99) 144/99     Weight: 79.6 kg (175 lb 7.8 oz)  Body mass index is 29.2 kg/m².    Intake/Output Summary (Last 24 hours) at 6/20/2024 1445  Last data filed at 6/20/2024 0436  Gross per 24 hour   Intake 520 ml   Output --   Net 520 ml         Physical Exam  Vitals and nursing note reviewed.   Constitutional:       General: She is not in acute distress.     Appearance: Normal appearance.   HENT:      Head: Normocephalic.      Mouth/Throat:      Mouth: Mucous membranes are moist.      Pharynx: Oropharynx is clear.   Eyes:      Pupils: Pupils are equal, round, and reactive to light.   Cardiovascular:      Rate and Rhythm: Normal rate and regular rhythm.   Pulmonary:      Effort: Pulmonary effort is normal. No respiratory distress.      Breath sounds: Normal breath sounds.   Abdominal:      General: Bowel sounds are normal. There is no distension.      Palpations: Abdomen is soft.      Tenderness: There is no abdominal tenderness.   Neurological:      General: No focal deficit present.      Mental Status: She is alert and oriented to person, place, and time.      Sensory: Sensory deficit (top of left foot) present.   Psychiatric:         Mood and Affect: Mood normal.             Significant Labs: All pertinent labs within the past 24 hours have been reviewed.  Recent Lab Results  (Last 5 results in the past 24 hours)        06/20/24  1103   06/20/24  0753   06/20/24  0437   06/19/24  2330   06/19/24  2110        Albumin     3.5           ALP     68           ALT     10           Anion Gap     7           AST     10           Baso #     0.01           Basophil %     0.2            BILIRUBIN TOTAL     0.4  Comment: For infants and newborns, interpretation of results should be based  on gestational age, weight and in agreement with clinical  observations.    Premature Infant recommended reference ranges:  Up to 24 hours.............<8.0 mg/dL  Up to 48 hours............<12.0 mg/dL  3-5 days..................<15.0 mg/dL  6-29 days.................<15.0 mg/dL             BUN     16           Calcium     9.1           Chloride     103           CO2     25           Creatinine     1.0           Differential Method     Automated           eGFR     >60.0           Eos #     0.1           Eos %     1.0           Glucose     375           Gran # (ANC)     2.5           Gran %     40.8           Hematocrit     32.1           Hemoglobin     10.6           Immature Grans (Abs)     0.01  Comment: Mild elevation in immature granulocytes is non specific and   can be seen in a variety of conditions including stress response,   acute inflammation, trauma and pregnancy. Correlation with other   laboratory and clinical findings is essential.             Immature Granulocytes     0.2           Lymph #     3.2           Lymph %     52.2           MCH     28.6           MCHC     33.0           MCV     87           Mono #     0.3           Mono %     5.6           MPV     10.6           nRBC     0           Platelet Count     257           POC Glucose 321   289     347   449       Potassium     4.0           PROTEIN TOTAL     6.3           RBC     3.71           RDW     12.4           Sodium     135           WBC     6.11                                  Significant Imaging: I have reviewed all pertinent imaging results/findings within the past 24 hours.      Assessment/Plan:      * Acute ischemic stroke  CTH: bilateral cerebellar hypodensities concerning for stroke  MRI brain: acute ischemic stroke in bilateral cerebellar hemispheres, mild to moderate WMD  CTA H/N without LVO or significant stenosis  Aspirin 81 mg  daily   Atorvastatin 80 mg daily   PT/OT/ST ordered  Echo: EF 65-70%, negative bubble  Neurology consulted  Hypercoagulable panel ordered by neurology: protein C, protein S, phosphatidylserine IgM and IgG, anticardiolipin, beta glycoprotein IgM and IgG, factor 5 Leiden, prothrombin 2 mutation, MTHFR mutation, lipoprotein a, factor 8, homocysteine, antithrombin 3       Hypertension  Hold amlodipine  Monitor BP      Diabetes mellitus without complication  Reports she has only been taking metformin outpatient but has been on insulin and other injectables in the past  Taken off for unknown reason  Lantus ordered BID  SSI  Accuchecks before meals and at bedtime  Endocrinology referral outpatient      VTE Risk Mitigation (From admission, onward)           Ordered     heparin (porcine) injection 5,000 Units  Every 8 hours         06/18/24 2236     IP VTE HIGH RISK PATIENT  Once         06/18/24 2236     Place sequential compression device  Until discontinued         06/18/24 2236                    Discharge Planning   TINA: 6/21/2024     Code Status: Full Code   Is the patient medically ready for discharge?:     Reason for patient still in hospital (select all that apply): Patient trending condition, Treatment, and Consult recommendations  Discharge Plan A: Home with family                  STACEY Wong  Department of Hospital Medicine   [facility]

## 2024-06-21 VITALS
TEMPERATURE: 99 F | BODY MASS INDEX: 29.24 KG/M2 | DIASTOLIC BLOOD PRESSURE: 83 MMHG | HEIGHT: 65 IN | WEIGHT: 175.5 LBS | SYSTOLIC BLOOD PRESSURE: 119 MMHG | RESPIRATION RATE: 18 BRPM | HEART RATE: 91 BPM | OXYGEN SATURATION: 97 %

## 2024-06-21 LAB
ALBUMIN SERPL BCP-MCNC: 3.5 G/DL (ref 3.5–5.2)
ALP SERPL-CCNC: 75 U/L (ref 55–135)
ALT SERPL W/O P-5'-P-CCNC: 12 U/L (ref 10–44)
ANION GAP SERPL CALC-SCNC: 10 MMOL/L (ref 8–16)
AST SERPL-CCNC: 16 U/L (ref 10–40)
BASOPHILS # BLD AUTO: 0.02 K/UL (ref 0–0.2)
BASOPHILS NFR BLD: 0.3 % (ref 0–1.9)
BILIRUB SERPL-MCNC: 0.4 MG/DL (ref 0.1–1)
BUN SERPL-MCNC: 16 MG/DL (ref 6–20)
CALCIUM SERPL-MCNC: 9 MG/DL (ref 8.7–10.5)
CHLORIDE SERPL-SCNC: 106 MMOL/L (ref 95–110)
CO2 SERPL-SCNC: 23 MMOL/L (ref 23–29)
CREAT SERPL-MCNC: 0.8 MG/DL (ref 0.5–1.4)
DIFFERENTIAL METHOD BLD: ABNORMAL
EOSINOPHIL # BLD AUTO: 0.1 K/UL (ref 0–0.5)
EOSINOPHIL NFR BLD: 1 % (ref 0–8)
ERYTHROCYTE [DISTWIDTH] IN BLOOD BY AUTOMATED COUNT: 12.5 % (ref 11.5–14.5)
EST. GFR  (NO RACE VARIABLE): >60 ML/MIN/1.73 M^2
GLUCOSE SERPL-MCNC: 148 MG/DL (ref 70–110)
GLUCOSE SERPL-MCNC: 152 MG/DL (ref 70–110)
HCT VFR BLD AUTO: 32.4 % (ref 37–48.5)
HCYS SERPL-SCNC: 10.6 UMOL/L (ref 0–14.5)
HGB BLD-MCNC: 10.8 G/DL (ref 12–16)
IMM GRANULOCYTES # BLD AUTO: 0.01 K/UL (ref 0–0.04)
IMM GRANULOCYTES NFR BLD AUTO: 0.1 % (ref 0–0.5)
LYMPHOCYTES # BLD AUTO: 3.2 K/UL (ref 1–4.8)
LYMPHOCYTES NFR BLD: 47.1 % (ref 18–48)
MAGNESIUM SERPL-MCNC: 1.8 MG/DL (ref 1.6–2.6)
MCH RBC QN AUTO: 29.1 PG (ref 27–31)
MCHC RBC AUTO-ENTMCNC: 33.3 G/DL (ref 32–36)
MCV RBC AUTO: 87 FL (ref 82–98)
MONOCYTES # BLD AUTO: 0.4 K/UL (ref 0.3–1)
MONOCYTES NFR BLD: 6.4 % (ref 4–15)
NEUTROPHILS # BLD AUTO: 3 K/UL (ref 1.8–7.7)
NEUTROPHILS NFR BLD: 45.1 % (ref 38–73)
NRBC BLD-RTO: 0 /100 WBC
PLATELET # BLD AUTO: 241 K/UL (ref 150–450)
PMV BLD AUTO: 10.4 FL (ref 9.2–12.9)
POTASSIUM SERPL-SCNC: 3.8 MMOL/L (ref 3.5–5.1)
PROT SERPL-MCNC: 6.1 G/DL (ref 6–8.4)
RBC # BLD AUTO: 3.71 M/UL (ref 4–5.4)
SODIUM SERPL-SCNC: 139 MMOL/L (ref 136–145)
WBC # BLD AUTO: 6.69 K/UL (ref 3.9–12.7)

## 2024-06-21 PROCEDURE — 25000003 PHARM REV CODE 250: Performed by: HOSPITALIST

## 2024-06-21 PROCEDURE — 83735 ASSAY OF MAGNESIUM: CPT

## 2024-06-21 PROCEDURE — 80053 COMPREHEN METABOLIC PANEL: CPT

## 2024-06-21 PROCEDURE — 85025 COMPLETE CBC W/AUTO DIFF WBC: CPT

## 2024-06-21 PROCEDURE — 36415 COLL VENOUS BLD VENIPUNCTURE: CPT

## 2024-06-21 PROCEDURE — 63600175 PHARM REV CODE 636 W HCPCS: Performed by: HOSPITALIST

## 2024-06-21 RX ORDER — ATORVASTATIN CALCIUM 80 MG/1
80 TABLET, FILM COATED ORAL DAILY
Qty: 30 TABLET | Refills: 0 | Status: SHIPPED | OUTPATIENT
Start: 2024-06-21

## 2024-06-21 RX ORDER — DEXTROSE 4 G
TABLET,CHEWABLE ORAL
Qty: 1 EACH | Refills: 0 | Status: SHIPPED | OUTPATIENT
Start: 2024-06-21 | End: 2025-06-21

## 2024-06-21 RX ORDER — INSULIN ASPART 100 [IU]/ML
0-5 INJECTION, SOLUTION INTRAVENOUS; SUBCUTANEOUS
Qty: 3 ML | Refills: 1 | Status: SHIPPED | OUTPATIENT
Start: 2024-06-21

## 2024-06-21 RX ORDER — INSULIN GLARGINE 100 [IU]/ML
20 INJECTION, SOLUTION SUBCUTANEOUS 2 TIMES DAILY
Qty: 3 ML | Refills: 3 | Status: SHIPPED | OUTPATIENT
Start: 2024-06-21

## 2024-06-21 RX ORDER — INSULIN GLARGINE 100 [IU]/ML
20 INJECTION, SOLUTION SUBCUTANEOUS 2 TIMES DAILY
Qty: 3 ML | Refills: 3 | Status: SHIPPED | OUTPATIENT
Start: 2024-06-21 | End: 2024-06-21

## 2024-06-21 RX ORDER — ASPIRIN 81 MG/1
81 TABLET ORAL DAILY
Qty: 30 TABLET | Refills: 0 | Status: SHIPPED | OUTPATIENT
Start: 2024-06-22

## 2024-06-21 RX ORDER — LANCETS
EACH MISCELLANEOUS
Qty: 120 EACH | Refills: 0 | Status: SHIPPED | OUTPATIENT
Start: 2024-06-21

## 2024-06-21 RX ADMIN — ASPIRIN 81 MG: 81 TABLET, COATED ORAL at 08:06

## 2024-06-21 RX ADMIN — HEPARIN SODIUM 5000 UNITS: 5000 INJECTION INTRAVENOUS; SUBCUTANEOUS at 06:06

## 2024-06-21 RX ADMIN — INSULIN GLARGINE 20 UNITS: 100 INJECTION, SOLUTION SUBCUTANEOUS at 08:06

## 2024-06-21 NOTE — PLAN OF CARE
Problem: Stroke, Ischemic (Includes Transient Ischemic Attack)  Goal: Optimal Coping  Outcome: Progressing  Goal: Effective Bowel Elimination  Outcome: Progressing  Goal: Optimal Cerebral Tissue Perfusion  Outcome: Progressing  Goal: Optimal Cognitive Function  Outcome: Progressing  Goal: Improved Communication Skills  Outcome: Progressing  Goal: Optimal Functional Ability  Outcome: Progressing  Goal: Optimal Nutrition Intake  Outcome: Progressing  Goal: Effective Oxygenation and Ventilation  Outcome: Progressing  Goal: Improved Sensorimotor Function  Outcome: Progressing  Goal: Safe and Effective Swallow  Outcome: Progressing  Goal: Effective Urinary Elimination  Outcome: Progressing     Problem: Fall Injury Risk  Goal: Absence of Fall and Fall-Related Injury  Outcome: Progressing     Problem: Adult Inpatient Plan of Care  Goal: Plan of Care Review  Outcome: Progressing  Goal: Patient-Specific Goal (Individualized)  Outcome: Progressing  Goal: Absence of Hospital-Acquired Illness or Injury  Outcome: Progressing  Goal: Optimal Comfort and Wellbeing  Outcome: Progressing  Goal: Readiness for Transition of Care  Outcome: Progressing     Problem: Infection  Goal: Absence of Infection Signs and Symptoms  Outcome: Progressing     Problem: Diabetes Comorbidity  Goal: Blood Glucose Level Within Targeted Range  Outcome: Progressing

## 2024-06-21 NOTE — PLAN OF CARE
Charts and orders reviewed. Pt to discharge home with family. Pt's requested a new PCP this hospital admission. New PCP appointment scheduled and PCP details added to AVS. Endocrinologist and Cardiologist ambulatory referral entered. Pt has no other needs to be addressed by case management. Pt cleared to discharge from case management standpoint.        06/21/24 1103   Final Note   Assessment Type Final Discharge Note   Anticipated Discharge Disposition Home   What phone number can be called within the next 1-3 days to see how you are doing after discharge? [phone]   Hospital Resources/Appts/Education Provided Provided patient/caregiver with written discharge plan information;Appointments scheduled and added to AVS   Post-Acute Status   Coverage [place] [employer]OSS BLUE SHIELD - [facility] ALL OUT OF STATE   Discharge Delays None known at this time

## 2024-06-21 NOTE — PLAN OF CARE
Spoke with the pt at the bedside, she will be discharging today. She is calling her  to come get her.

## 2024-06-21 NOTE — PLAN OF CARE
Problem: Stroke, Ischemic (Includes Transient Ischemic Attack)  Goal: Optimal Coping  Outcome: Met  Goal: Effective Bowel Elimination  Outcome: Met  Goal: Optimal Cerebral Tissue Perfusion  Outcome: Met  Goal: Optimal Cognitive Function  Outcome: Met  Goal: Improved Communication Skills  Outcome: Met  Goal: Optimal Functional Ability  Outcome: Met  Goal: Optimal Nutrition Intake  Outcome: Met  Goal: Effective Oxygenation and Ventilation  Outcome: Met  Goal: Improved Sensorimotor Function  Outcome: Met  Goal: Safe and Effective Swallow  Outcome: Met  Goal: Effective Urinary Elimination  Outcome: Met     Problem: Fall Injury Risk  Goal: Absence of Fall and Fall-Related Injury  Outcome: Met     Problem: Adult Inpatient Plan of Care  Goal: Plan of Care Review  Outcome: Met  Goal: Patient-Specific Goal (Individualized)  Outcome: Met  Goal: Absence of Hospital-Acquired Illness or Injury  Outcome: Met  Goal: Optimal Comfort and Wellbeing  Outcome: Met  Goal: Readiness for Transition of Care  Outcome: Met     Problem: Infection  Goal: Absence of Infection Signs and Symptoms  Outcome: Met

## 2024-06-21 NOTE — DISCHARGE SUMMARY
[facility] Medicine  Discharge Summary      Patient Name: Meg Lockhart  MRN: [id number]  [facility]: [id number]  Patient Class: IP- Inpatient  Admission Date: 6/18/2024  Hospital Length of Stay: 3 days  Discharge Date and Time:  06/21/2024 11:04 AM  Attending Physician: Omar Ahn DO   Discharging Provider: STACEY Wong  Primary Care Provider: Katya Gonzalez PA-C    Primary Care Team: Networked reference to record PCT     HPI:   This is a 54-year-old female who has been having vertigo and unsteady gait starting June 2, 2024.  She was seen at a hospital and they were concerned that she may have a stroke but the patient left without getting an MRI.  Her symptoms include vertigo and loss of balance in the lower extremity.  She also says that she feels burning sensation in the lower extremity.  She has a history of hypertension and diabetes.  There are significant history of stroke in her family.  A stroke alert was ordered and an MRI was followed showing an acute bilateral cerebellar infarct.  MRA was also performed which did not show significant occlusion.  Neurology was consulted and recommended inpatient admission.  Patient otherwise awake alert and oriented.  No nausea or vomiting.  Complained of bilateral lower extremity weakness.    * No surgery found *      Hospital Course:   54-year-old female was admitted with CVA.  Stroke protocol was initiated.  Neurology was consulted.  CT of head showed bilateral cerebellar hypodensities concerning for stroke.  MRI of brain showed acute ischemic stroke in bilateral cerebellar hemispheres, mild to moderate WMD.  CTA of H/N without LVO or significant stenosis.  Echo: EF 65-70%, negative bubble.  MRA of brain without significant stenosis or LVO.  She was started on aspirin and high-intensity statin.  Hypercoagulable panel was ordered by neurology: protein C, protein S, phosphatidylserine IgM and IgG, anticardiolipin, beta glycoprotein IgM  and IgG, factor 5 Leiden, prothrombin 2 mutation, MTHFR mutation, lipoprotein a, factor 8, homocysteine, antithrombin 3 and is currently pending.  She was evaluated by PT and OT and no therapy was recommended.  She has no focal weakness.  She was also evaluated by speech therapy and was found to have mild cognitive linguistic impairment, outpatient speech therapy was recommended.  Outpatient PT, OT, and ST were ordered.  Patient was hyperglycemic on arrival.  Hemoglobin A1c was 14.8.  Patient reports that she has been only taking metformin, but she had taken many other injectables and insulin in the past, unsure why she was taken off of these.  She was started on Lantus twice a day and short-acting sliding scale insulin while inpatient with good blood sugar control.  Patient reports that she lost her glucometer.  New glucometer was ordered with strips and lancets.  Discussed importance of blood sugar control and checking blood sugar before meals and at bedtime.  Registered dietitian saw and evaluated patient, gave instructions regarding diabetic diet.  Referral was sent for endocrinology outpatient.  Patient also requesting new PCP which was arranged by case management.  Neurology recommended outpatient cardiology follow up for an outpatient Holter monitor to assess for arrhythmias.  Patient was monitored on telemetry throughout hospitalization and remained normal sinus rhythm.  Referral placed for cardiology follow up outpatient.  Patient was discussed with Dr. Franco Christianson, patient to follow up with her outpatient in 1 week.  Plan of care was discussed with patient and verbalized understanding.  All questions and concerns addressed.  Patient was seen and examined on day of discharge.     Goals of Care Treatment Preferences:  Code Status: Full Code      Consults:   Consults (From admission, onward)          Status Ordering Provider     Inpatient consult to Registered Dietitian/Nutritionist  Once        Provider:   (Not yet assigned)    Completed JADYN ARRIETA     IP consult to case management/social work  Once        Provider:  (Not yet assigned)    Completed JADYN ARRIETA     Inpatient consult to neurology  Once        Provider:  (Not yet assigned)    Completed NICOLE LOMBARDO  * Acute ischemic stroke  CTH: bilateral cerebellar hypodensities concerning for stroke  MRI brain: acute ischemic stroke in bilateral cerebellar hemispheres, mild to moderate WMD  CTA H/N without LVO or significant stenosis  Aspirin 81 mg daily   Atorvastatin 80 mg daily   PT/OT/ST ordered  Echo: EF 65-70%, negative bubble  Neurology consulted  Hypercoagulable panel ordered by neurology: protein C, protein S, phosphatidylserine IgM and IgG, anticardiolipin, beta glycoprotein IgM and IgG, factor 5 Leiden, prothrombin 2 mutation, MTHFR mutation, lipoprotein a, factor 8, homocysteine, antithrombin 3         Final Active Diagnoses:    Diagnosis Date Noted POA    PRINCIPAL PROBLEM:  Acute ischemic stroke [I63.9] 06/18/2024 Yes    Hypertension [I10]  Yes    Diabetes mellitus without complication [E11.9] 02/28/2013 Yes      Problems Resolved During this Admission:       Discharged Condition: good    Disposition: Home or Self Care    Follow Up:   Follow-up Information       Asuncion Monte MD Follow up.    Specialty: Neurology  Contact information:  [address]  [place] [address]  [phone]               Katya Gonzalez PA-C Follow up on 7/3/2024.    Specialty: Family Medicine  Why: @8:30am  Contact information:  [address]  [place] [address]  [phone]                           Patient Instructions:      Ambulatory referral/consult to Endocrinology   Standing Status: Future   Referral Priority: Routine Referral Type: Consultation   Requested Specialty: Endocrinology   Number of Visits Requested: 1     Ambulatory referral/consult to Physical/Occupational Therapy   Standing Status: Future   Referral Priority: Routine Referral  Type: Physical Medicine   Referral Reason: Specialty Services Required   Number of Visits Requested: 1     Ambulatory referral/consult to Speech Therapy   Standing Status: Future   Referral Priority: Routine Referral Type: Speech Therapy   Referral Reason: Specialty Services Required   Requested Specialty: Speech Pathology   Number of Visits Requested: 1     Ambulatory referral/consult to Cardiology   Standing Status: Future   Referral Priority: Routine Referral Type: Consultation   Referral Reason: Specialty Services Required   Requested Specialty: Cardiology   Number of Visits Requested: 1     Diet diabetic     Notify your health care provider if you experience any of the following:  temperature >100.4     Notify your health care provider if you experience any of the following:  persistent nausea and vomiting or diarrhea     Notify your health care provider if you experience any of the following:  severe uncontrolled pain     Notify your health care provider if you experience any of the following:  difficulty breathing or increased cough     Notify your health care provider if you experience any of the following:  severe persistent headache     Notify your health care provider if you experience any of the following:  worsening rash     Notify your health care provider if you experience any of the following:  persistent dizziness, light-headedness, or visual disturbances     Notify your health care provider if you experience any of the following:  increased confusion or weakness     Notify your health care provider if you experience any of the following:   Order Comments: Chest pain, shortness of breath, focal weakness, syncope     Activity as tolerated       Significant Diagnostic Studies: Labs: CMP   Recent Labs   Lab 06/20/24  0437 06/21/24  0508   * 139   K 4.0 3.8    106   CO2 25 23   * 152*   BUN 16 16   CREATININE 1.0 0.8   CALCIUM 9.1 9.0   PROT 6.3 6.1   ALBUMIN 3.5 3.5   BILITOT 0.4 0.4    ALKPHOS 68 75   AST 10 16   ALT 10 12   ANIONGAP 7* 10    and CBC   Recent Labs   Lab 06/20/24  0437 06/21/24  0508   WBC 6.11 6.69   HGB 10.6* 10.8*   HCT 32.1* 32.4*    241     Imaging Results              CTA Head and Neck (xpd) (Final result)  Result time 06/18/24 23:54:33      Final result by Myesha Linn MD (06/18/24 23:54:33)                   Impression:      No appreciable acute abnormality on CTA.    No high-grade stenosis or major vessel occlusion.      Electronically signed by: Myesha Linn  Date:    06/18/2024  Time:    23:54               Narrative:    EXAMINATION:  CTA HEAD AND NECK (XPD)    CLINICAL HISTORY:  stroke;    TECHNIQUE:  Non contrast low dose axial images were obtained through the head. CT angiogram was performed from the level of the hattie to the top of the head following the IV administration of 100mL of Omnipaque 350.   Sagittal and coronal reconstructions and maximum intensity projection reconstructions were performed. Arterial stenosis percentages are based on NASCET measurement criteria.    COMPARISON:  MRA and MRI brain 06/18/2024    FINDINGS:  Intracranial Compartment:    Limited imaging demonstrates ventricles and sulci are normal in size for age without evidence of hydrocephalus. No appreciable is extra-axial blood or fluid collections.    The brain parenchyma appears normal. No parenchymal mass, hemorrhage, edema, or major vascular distribution infarct.  Known cerebellar infarcts are not visualized.    Skull/Extracranial Contents (limited evaluation): No fracture. Mastoid air cells and paranasal sinuses are essentially clear.    Non-Vascular Structures of the Neck/Thoracic Inlet (limited evaluation): Normal.    Aorta: Normal 3 vessel arch.    Extracranial carotid circulation: No hemodynamically significant stenosis, aneurysmal dilatation, or dissection.  40% stenosis at the origin of the right internal carotid artery secondary to atherosclerotic  calcification.    Extracranial vertebral circulation: No hemodynamically significant stenosis, aneurysmal dilatation, or dissection.    Intracranial Arteries: No focal high-grade stenosis, occlusion, or aneurysm.    Venous structures (limited evaluation): Normal.                                       MRA Brain without contrast (Final result)  Result time 06/18/24 21:59:25      Final result by Halley Mckeon MD (06/18/24 21:59:25)                   Impression:      No large vessel occlusion.      Electronically signed by: Halley Mckeon  Date:    06/18/2024  Time:    21:59               Narrative:    EXAMINATION:  MRA BRAIN WITHOUT CONTRAST    CLINICAL HISTORY:  concern for stroke; .    TECHNIQUE:  Non-contrast 3-D time-of-flight intracranial MR angiography was performed through the [place] with MIP reformatting.    COMPARISON:  None.    FINDINGS:  Anterior intracranial circulation: No high-grade focal stenosis, occlusion, aneurysm, or vascular malformation.    Posterior intracranial circulation: No high-grade focal stenosis, occlusion, aneurysm, or vascular malformation.                                       MRI Brain Without Contrast (Final result)  Result time 06/18/24 21:50:15      Final result by Halley Mckeon MD (06/18/24 21:50:15)                   Impression:      Acute bilateral cerebellar infarcts.    At least 20 T2/FLAIR hyperintense foci in the periventricular and deep white matter including the right side of the midbrain and joaquin.  Considerations include chronic microvascular ischemia, demyelinating disease, or migraine headaches.    COMMUNICATION  The critical information above was relayed directly by Halley Mckeon by Epic secure chat (with acknowledgement) to JOI Barone MD and KEVIN Joseph MD on 6/18/2024 at 930 PM      Electronically signed by: Halley Mckeon  Date:    06/18/2024  Time:    21:50               Narrative:    EXAMINATION:  MRI BRAIN WITHOUT  CONTRAST    CLINICAL HISTORY:  concern for stroke;.    TECHNIQUE:  Multiplanar multisequence MR imaging of the brain was performed without contrast.    COMPARISON:  None    FINDINGS:  Intracranial compartment:    Ventricles and sulci are normal in size for age without evidence of hydrocephalus. No extra-axial blood or fluid collections.    Acute infarcts throughout the bilateral cerebellar hemispheres, right greater than left.  No mass lesion or acute hemorrhage.  At least 20 T2/FLAIR hyperintense foci in the periventricular and deep white matter including the right side of the midbrain and joaquin.    Normal vascular flow voids are preserved.    Skull/extracranial contents (limited evaluation): Bone marrow signal intensity is normal.                                       X-Ray Chest PA And Lateral (Final result)  Result time 06/18/24 20:46:25      Final result by Myesha Linn MD (06/18/24 20:46:25)                   Impression:    [  No acute abnormality.      Electronically signed by: Myesha Linn  Date:    06/18/2024  Time:    20:46               Narrative:    EXAMINATION:  XR CHEST PA AND LATERAL    CLINICAL HISTORY:  Nausea with vomiting, unspecified    TECHNIQUE:  PA and lateral views of the chest were performed.    COMPARISON:  12/30/2019, 08/14/2013 chest x-ray    FINDINGS:  Cardiac silhouette is not enlarged.  Lungs appear clear.  There is no pleural effusion or pneumothorax.  Osseous structures are intact.                                       Pending Diagnostic Studies:       Procedure Component Value Units Date/Time    Antithrombin III [[id number]] Collected: 06/19/24 1420    Order Status: Sent Lab Status: In process Updated: 06/19/24 1525    Specimen: Blood     Beta-2 glycoprotein Abs (IgA, IgG, IgM) [[id number]] Collected: 06/19/24 1420    Order Status: Sent Lab Status: In process Updated: 06/19/24 1525    Specimen: Blood     Cardiolipin antibody [[id number]] Collected: 06/19/24 1420    Order  Status: Sent Lab Status: In process Updated: 06/19/24 1525    Specimen: Blood     Factor 5 leiden [[id number]] Collected: 06/19/24 1420    Order Status: Sent Lab Status: In process Updated: 06/19/24 1525    Specimen: Blood     Factor 8 assay [[id number]] Collected: 06/19/24 1420    Order Status: Sent Lab Status: In process Updated: 06/19/24 1525    Specimen: Blood     Lipoprotein A (LPA) [[id number]]     Order Status: Sent Lab Status: No result     Specimen: Blood     Lupus anticoagulant (DRVVT) [[id number]] Collected: 06/19/24 1420    Order Status: Sent Lab Status: In process Updated: 06/19/24 1525    Specimen: Blood     Methylenetetrahydrofol Genotyping (C677T/N8347H) [[id number]] Collected: 06/19/24 1420    Order Status: Sent Lab Status: In process Updated: 06/19/24 1525    Specimen: Blood     Phosphatidylserine Ab (IgA,IgG,IgM) [[id number]] Collected: 06/19/24 1420    Order Status: Sent Lab Status: In process Updated: 06/19/24 1525    Specimen: Blood     Protein C activity [[id number]] Collected: 06/19/24 1420    Order Status: Sent Lab Status: In process Updated: 06/19/24 1525    Specimen: Blood     Protein S activity [[id number]] Collected: 06/19/24 1434    Order Status: Sent Lab Status: In process Updated: 06/19/24 1736    Specimen: Blood     Prothrombin gene mutation [[id number]] Collected: 06/19/24 1420    Order Status: Sent Lab Status: In process Updated: 06/19/24 1525    Specimen: Blood            Medications:  Reconciled Home Medications:      Medication List        START taking these medications      aspirin 81 MG EC tablet  Commonly known as: ECOTRIN  Take 1 tablet (81 mg total) by mouth once daily.  Start taking on: June 22, 2024     atorvastatin 80 MG tablet  Commonly known as: LIPITOR  Take 1 tablet (80 mg total) by mouth once daily.     blood sugar diagnostic Strp  To check BG 4 times daily, to use with insurance preferred meter     blood-glucose meter Misc  Commonly known as: ACCU-CHEK GUIDE  GLUCOSE METER  use to check BG 4 times a day     insulin aspart U-100 100 unit/mL (3 mL) Inpn pen  Commonly known as: NovoLOG  Inject 0-5 Units into the skin 4 (four) times daily before meals and nightly.     insulin glargine U-100 (Lantus) 100 unit/mL (3 mL) Inpn pen  Inject 20 Units into the skin 2 (two) times daily.  Replaces: TRESIBA FLEXTOUCH U-100 100 unit/mL (3 mL) insulin pen     lancets Misc  Use to check BG four times daily  Replaces: FREESTYLE LANCETS 28 gauge lancets            CONTINUE taking these medications      amitriptyline 10 MG tablet  Commonly known as: ELAVIL  Take 10 mg by mouth every evening.     chlorthalidone 25 MG Tab  Commonly known as: HYGROTEN  Take 1 tablet (25 mg total) by mouth once daily.     famotidine 40 MG tablet  Commonly known as: PEPCID  Take 1 tablet (40 mg total) by mouth once daily.     LORazepam 1 MG tablet  Commonly known as: ATIVAN  Take 1 mg by mouth daily as needed for Anxiety.            STOP taking these medications      acetaminophen 650 MG Tbsr  Commonly known as: TYLENOL     amLODIPine 10 MG tablet  Commonly known as: NORVASC     cetirizine 10 MG tablet  Commonly known as: ZYRTEC     CLOTRIMAZOLE 3 DAY 2 %  Generic drug: clotrimazole     FREESTYLE LANCETS 28 gauge lancets  Generic drug: lancets  Replaced by: lancets Misc     furosemide 20 MG tablet  Commonly known as: LASIX     ketoconazole 2 % cream  Commonly known as: NIZORAL     LIDOcaine 5 % Oint ointment  Commonly known as: XYLOCAINE     metFORMIN 500 MG tablet  Commonly known as: GLUCOPHAGE     NURTEC 75 mg odt  Generic drug: rimegepant     ondansetron 4 MG Tbdl  Commonly known as: ZOFRAN-ODT     rosuvastatin 20 MG tablet  Commonly known as: CRESTOR     TRESIBA FLEXTOUCH U-100 100 unit/mL (3 mL) insulin pen  Generic drug: insulin degludec  Replaced by: insulin glargine U-100 (Lantus) 100 unit/mL (3 mL) Inpn pen     WEGOVY 2.4 mg/0.75 mL Pnij  Generic drug: semaglutide (weight loss)              Indwelling  Lines/Drains at time of discharge:   Lines/Drains/Airways       None                   Time spent on the discharge of patient: 35 minutes         STACEY Wong  Department of Hospital Medicine  [facility]

## 2024-06-21 NOTE — PT/OT/SLP PROGRESS
Physical Therapy      Patient Name:  Meg Lockhart   MRN:  [id number]    Patient not seen today secondary to Other (Comment) (prepping for discharge). Will follow-up 6/24/24 if not discharged as planned.

## 2024-06-22 LAB
AT III ACT/NOR PPP CHRO: 98 % (ref 75–135)
B2 GLYCOPROT1 IGA SER-ACNC: <9 GPI IGA UNITS (ref 0–25)
B2 GLYCOPROT1 IGG SER-ACNC: <9 GPI IGG UNITS (ref 0–20)
B2 GLYCOPROT1 IGM SER-ACNC: <9 GPI IGM UNITS (ref 0–32)
CARDIOLIPIN IGA SER IA-ACNC: <9 MPL U/ML (ref 0–12)
CARDIOLIPIN IGG SER IA-ACNC: <9 GPL U/ML (ref 0–14)
CARDIOLIPIN IGM SER IA-ACNC: <9 APL U/ML (ref 0–11)
FACT VIII ACT/NOR PPP: 230 % (ref 56–140)
PHOSPHATIDYLSERINE AB (IGA): <10 UNITS (ref 0–30)
PHOSPHATIDYLSERINE AB (IGG): 1 APS UNITS (ref 0–19)
PHOSPHATIDYLSERINE AB (IGM): 9 UNITS (ref 0–30)
PROT C ACT/NOR PPP: 170 % (ref 73–180)
PROT S ACT/NOR PPP: 91 % (ref 63–140)

## 2024-06-23 LAB
LABCORP MISC TEST CODE: NORMAL
LABCORP MISC TEST NAME: NORMAL
LABCORP MISCELLANEOUS TEST: NORMAL

## 2024-06-27 LAB
CONFIRM DRVVT: NORMAL SEC
SCREEN DRVVT: 32.5 SEC

## 2024-07-03 ENCOUNTER — TELEPHONE (OUTPATIENT)
Dept: FAMILY MEDICINE | Facility: CLINIC | Age: 54
End: 2024-07-03

## 2024-07-03 ENCOUNTER — E-CONSULT (OUTPATIENT)
Dept: ENDOCRINOLOGY | Facility: CLINIC | Age: 54
End: 2024-07-03
Payer: COMMERCIAL

## 2024-07-03 ENCOUNTER — OFFICE VISIT (OUTPATIENT)
Dept: FAMILY MEDICINE | Facility: CLINIC | Age: 54
End: 2024-07-03
Payer: COMMERCIAL

## 2024-07-03 VITALS
TEMPERATURE: 98 F | BODY MASS INDEX: 29.09 KG/M2 | WEIGHT: 174.63 LBS | OXYGEN SATURATION: 99 % | HEIGHT: 65 IN | DIASTOLIC BLOOD PRESSURE: 80 MMHG | HEART RATE: 77 BPM | SYSTOLIC BLOOD PRESSURE: 120 MMHG

## 2024-07-03 DIAGNOSIS — R42 DIZZINESS: ICD-10-CM

## 2024-07-03 DIAGNOSIS — M79.605 LEFT LEG PAIN: ICD-10-CM

## 2024-07-03 DIAGNOSIS — Z86.73 HISTORY OF ISCHEMIC STROKE: ICD-10-CM

## 2024-07-03 DIAGNOSIS — Z09 HOSPITAL DISCHARGE FOLLOW-UP: Primary | ICD-10-CM

## 2024-07-03 DIAGNOSIS — E11.9 DIABETES MELLITUS WITHOUT COMPLICATION: ICD-10-CM

## 2024-07-03 DIAGNOSIS — E11.9 DIABETES MELLITUS WITHOUT COMPLICATION: Primary | ICD-10-CM

## 2024-07-03 DIAGNOSIS — I10 PRIMARY HYPERTENSION: ICD-10-CM

## 2024-07-03 PROCEDURE — 1160F RVW MEDS BY RX/DR IN RCRD: CPT | Mod: CPTII,S$GLB,,

## 2024-07-03 PROCEDURE — 1159F MED LIST DOCD IN RCRD: CPT | Mod: CPTII,S$GLB,,

## 2024-07-03 PROCEDURE — 99999 PR PBB SHADOW E&M-EST. PATIENT-LVL V: CPT | Mod: PBBFAC,,,

## 2024-07-03 PROCEDURE — 3008F BODY MASS INDEX DOCD: CPT | Mod: CPTII,S$GLB,,

## 2024-07-03 PROCEDURE — 3046F HEMOGLOBIN A1C LEVEL >9.0%: CPT | Mod: CPTII,S$GLB,,

## 2024-07-03 PROCEDURE — 1111F DSCHRG MED/CURRENT MED MERGE: CPT | Mod: CPTII,S$GLB,,

## 2024-07-03 PROCEDURE — 3079F DIAST BP 80-89 MM HG: CPT | Mod: CPTII,S$GLB,,

## 2024-07-03 PROCEDURE — 99215 OFFICE O/P EST HI 40 MIN: CPT | Mod: S$GLB,,,

## 2024-07-03 PROCEDURE — 3074F SYST BP LT 130 MM HG: CPT | Mod: CPTII,S$GLB,,

## 2024-07-03 RX ORDER — INSULIN GLARGINE 100 [IU]/ML
30 INJECTION, SOLUTION SUBCUTANEOUS DAILY
Start: 2024-07-03 | End: 2024-07-03

## 2024-07-03 RX ORDER — INSULIN ASPART 100 [IU]/ML
0-5 INJECTION, SOLUTION INTRAVENOUS; SUBCUTANEOUS
Qty: 3 ML | Refills: 1 | Status: SHIPPED | OUTPATIENT
Start: 2024-07-03 | End: 2024-07-03

## 2024-07-03 RX ORDER — INSULIN GLARGINE 100 [IU]/ML
20 INJECTION, SOLUTION SUBCUTANEOUS DAILY
Start: 2024-07-03

## 2024-07-03 RX ORDER — INSULIN GLARGINE 100 [IU]/ML
15 INJECTION, SOLUTION SUBCUTANEOUS 2 TIMES DAILY
Start: 2024-07-03 | End: 2024-07-03

## 2024-07-03 NOTE — TELEPHONE ENCOUNTER
Call placed to [facility], spoke to pharmacist who states the rejection message does not provide information regarding formulary alternatives, it only advised Novolog is not covered by insurance.  Will send follow up message to SHINE Gonzalez for notification.

## 2024-07-03 NOTE — TELEPHONE ENCOUNTER
Molly Balbuena Staff     ----- Message from Molly Balbuena sent at 7/3/2024 10:34 AM CDT -----  Contact: Self  Type: Needs Medical Advice  Who Called:  Patient  Best Call Back Number: [phone]  Additional Information: Pt is calling in regards to the novalog that was prescribed stated her insurance is not covering that medication and is wanting to see if there was an alternative that she can prescribe. Can we please call pt back to advise. Thank You

## 2024-07-03 NOTE — CONSULTS
[facility]  Response for E-Consult     Patient Name: Meg Lockhart  MRN: [id number]  Primary Care Provider: Cecelia, Primary Doctor   Requesting Provider: Katya Gonzalez PA-C  E-Consult to Endocrinology  Consult performed by: Michelle Monique MD  Consult ordered by: Katya Gonzalez PA-C          54-year-old with longstanding diabetes and recent stroke who was discharged on Lantus 20 b.i.d. and mealtime insulin which she has not been able to get.  Of note her BMI is 29.      As you know is very difficult to give diabetes recommendations when you do not have the patient in front of you nor do you have the data about med adherence, types of meals, snacks.      Therefore with the safest to do is to do weight based dosing.  at a weight of 80 kg 0.5 units/kilogram is reasonable for a total daily dose of 40 which she is on but she is having hypoglycemia.      would recommend changing the Lantus to 20 once a day and adding any mealtime covered by her insurance starting at 6 a.c. t.i.d..    If cost is not an issue she would benefit from incretin +/- SGLT2.  If started, decrease insulin dose.       Total time of Consultation: 10 minute    I did not speak to the requesting provider verbally about this.     *This eConsult is based on the clinical data available to me and is furnished without benefit of a physical examination. The eConsult will need to be interpreted in light of any clinical issues or changes in patient status not available to me at the time of filing this eConsults. Significant changes in patient condition or level of acuity should result in immediate formal consultation and reevaluation. Please alert me if you have further questions.    Thank you for this eConsult referral.     MD [facility]

## 2024-07-03 NOTE — TELEPHONE ENCOUNTER
"Patient reports she received prescription for Lantus today.  States Novolog is not covered by insurance, requesting alternative.  Patient states "I need a short acting insulin.  Is there something else that can be prescribed?  The Novolog ordered by the hospital is not covered by my insurance."  Please advise. Thank you.   "

## 2024-07-03 NOTE — TELEPHONE ENCOUNTER
"Please let the patient know I received her e-consult back from the endocrinologist. Recommendation is as follows:     "would recommend changing the Lantus to 20 once a day and adding any mealtime covered by her insurance starting at 6 a.c. t.i.d..     If cost is not an issue she would benefit from incretin +/- SGLT2.  If started, decrease insulin dose."       Can we please call the patients pharmacy to determine which short acting insulin is covered by her insurance?   "

## 2024-07-03 NOTE — PROGRESS NOTES
Subjective:       Patient ID: Meg Lockhart is a 54 y.o. female.    Chief Complaint: hospital follow up     Transitional Care Note    Family and/or Caretaker present at visit?  Yes.  Diagnostic tests reviewed/disposition: I have reviewed all completed as well as pending diagnostic tests at the time of discharge.  Disease/illness education: see below  Home health/community services discussion/referrals: Patient does not have home health established from hospital visit.  They do not need home health.  If needed, we will set up home health for the patient.   Establishment or re-establishment of referral orders for community resources: No other necessary community resources.   Discussion with other health care providers: No discussion with other health care providers necessary.        Meg Lockhart is a 54 y.o. female with DM2, HTN, history of ischemic stroke who presents to clinic for hospital follow up. Pt presented to the ED on 6/18 for evaluation of vertigo/ dizziness and unsteady gait ongoing for several weeks. MRI revealed acute bilateral cerebellar infarct and mild to moderate WMD. See discharge summary below.    Since discharge, she reports persistent dizziness and vertigo. She has follow up with neurology on 7/25, Dr. Gamez. She notes she also is suffering with difficulties with balance associated with the dizziness. Planning to start PT on 7/12. She was continued on Lantus 20 units twice daily and Novolog sliding scale with meals. She notes she has not been able to get the Novolog. She reports AM sugar in 70-90 range with 2-3 episodes of hypoglycemia in the 50s. Has not had follow up with endocrinology at this time. At discharge, hospitalist also recommended holter monitor to assess for arrhythmias/ follow up with cardiology.       Discharge note on 6/21/24    HPI:   This is a 54-year-old female who has been having vertigo and unsteady gait starting June 2, 2024.  She was seen at a hospital and  they were concerned that she may have a stroke but the patient left without getting an MRI.  Her symptoms include vertigo and loss of balance in the lower extremity.  She also says that she feels burning sensation in the lower extremity.  She has a history of hypertension and diabetes.  There are significant history of stroke in her family.  A stroke alert was ordered and an MRI was followed showing an acute bilateral cerebellar infarct.  MRA was also performed which did not show significant occlusion.  Neurology was consulted and recommended inpatient admission.  Patient otherwise awake alert and oriented.  No nausea or vomiting.  Complained of bilateral lower extremity weakness.     * No surgery found *       Hospital Course:   54-year-old female was admitted with CVA.  Stroke protocol was initiated.  Neurology was consulted.  CT of head showed bilateral cerebellar hypodensities concerning for stroke.  MRI of brain showed acute ischemic stroke in bilateral cerebellar hemispheres, mild to moderate WMD.  CTA of H/N without LVO or significant stenosis.  Echo: EF 65-70%, negative bubble.  MRA of brain without significant stenosis or LVO.  She was started on aspirin and high-intensity statin.  Hypercoagulable panel was ordered by neurology: protein C, protein S, phosphatidylserine IgM and IgG, anticardiolipin, beta glycoprotein IgM and IgG, factor 5 Leiden, prothrombin 2 mutation, MTHFR mutation, lipoprotein a, factor 8, homocysteine, antithrombin 3 and is currently pending.  She was evaluated by PT and OT and no therapy was recommended.  She has no focal weakness.  She was also evaluated by speech therapy and was found to have mild cognitive linguistic impairment, outpatient speech therapy was recommended.  Outpatient PT, OT, and ST were ordered.  Patient was hyperglycemic on arrival.  Hemoglobin A1c was 14.8.  Patient reports that she has been only taking metformin, but she had taken many other injectables and  insulin in the past, unsure why she was taken off of these.  She was started on Lantus twice a day and short-acting sliding scale insulin while inpatient with good blood sugar control.  Patient reports that she lost her glucometer.  New glucometer was ordered with strips and lancets.  Discussed importance of blood sugar control and checking blood sugar before meals and at bedtime.  Registered dietitian saw and evaluated patient, gave instructions regarding diabetic diet.  Referral was sent for endocrinology outpatient.  Patient also requesting new PCP which was arranged by case management.  Neurology recommended outpatient cardiology follow up for an outpatient Holter monitor to assess for arrhythmias.  Patient was monitored on telemetry throughout hospitalization and remained normal sinus rhythm.  Referral placed for cardiology follow up outpatient.  Patient was discussed with Dr. Franco Christianson, patient to follow up with her outpatient in 1 week.  Plan of care was discussed with patient and verbalized understanding.  All questions and concerns addressed.  Patient was seen and examined on day of discharge.      Goals of Care Treatment Preferences:  Code Status: Full Code  Review of Systems   Constitutional:  Negative for fatigue.   Respiratory:  Negative for cough.    Cardiovascular:  Negative for chest pain and leg swelling.   Musculoskeletal:  Positive for gait problem.   Neurological:  Positive for dizziness. Negative for headaches.       Patient Active Problem List   Diagnosis    Diabetes mellitus without complication    Diabetic neuropathy    Obesity    Vitamin D deficiency disease    Hypertension    Mixed hyperlipidemia    Acute ischemic stroke       Objective:      Physical Exam  Vitals reviewed.   Constitutional:       Appearance: Normal appearance.   HENT:      Head: Normocephalic and atraumatic.   Eyes:      Extraocular Movements: Extraocular movements intact.      Conjunctiva/sclera: Conjunctivae normal.  "     Pupils: Pupils are equal, round, and reactive to light.   Cardiovascular:      Rate and Rhythm: Normal rate and regular rhythm.      Heart sounds: Normal heart sounds.   Pulmonary:      Effort: Pulmonary effort is normal.      Breath sounds: Normal breath sounds.   Musculoskeletal:         General: Normal range of motion.      Cervical back: Normal range of motion and neck supple.   Skin:     General: Skin is warm and dry.   Neurological:      Mental Status: She is alert and oriented to person, place, and time.      Cranial Nerves: No cranial nerve deficit.      Sensory: Sensation is intact.      Motor: Motor function is intact.      Coordination: Finger-Nose-Finger Test abnormal. Heel to Andres Test normal.      Gait: Gait is intact.   Psychiatric:         Behavior: Behavior normal.         Lab Results   Component Value Date    WBC 6.69 06/21/2024    HGB 10.8 (L) 06/21/2024    HCT 32.4 (L) 06/21/2024     06/21/2024    CHOL 140 06/18/2024    TRIG 155 (H) 06/18/2024    HDL 54 06/18/2024    ALT 12 06/21/2024    AST 16 06/21/2024     06/21/2024    K 3.8 06/21/2024     06/21/2024    CREATININE 0.8 06/21/2024    BUN 16 06/21/2024    CO2 23 06/21/2024    TSH 2.633 06/18/2024    INR 0.9 06/19/2024    HGBA1C 14.8 (H) 06/18/2024     The ASCVD Risk score (Angle STUART, et al., 2019) failed to calculate for the following reasons:    The patient has a prior MI or stroke diagnosis  Visit Vitals  /80 (BP Location: Right arm, Patient Position: Sitting, BP Method: Small (Manual))   Pulse 77   Temp 98 °F (36.7 °C) (Oral)   Ht 5' 5" (1.651 m)   Wt 79.2 kg (174 lb 9.7 oz)   SpO2 99%   BMI 29.06 kg/m²      Assessment:       1. Hospital discharge follow-up    2. History of ischemic stroke    3. Diabetes mellitus without complication    4. Primary hypertension    5. Dizziness    6. Left leg pain        Plan:       1. Hospital discharge follow-up       -    Discussed hospital stay in detail.     2. History of " ischemic stroke  -     Ambulatory referral/consult to Cardiology; Future; Expected date: 07/10/2024  -     Cardiac Monitor - 3-15 Day Adult (Cupid Only); Future  -     Continue ASA, statin and antihypertensive    3. Diabetes mellitus without complication  -     Ambulatory referral/consult to Endocrinology; Future; Expected date: 07/10/2024  -     E-Consult to Endocrinology  -     Appreciate assistance on insulin dosage via e-consult    4. Primary hypertension  -     Ambulatory referral/consult to Cardiology; Future; Expected date: 07/10/2024  -     Cardiac Monitor - 3-15 Day Adult (Cupid Only); Future    5. Dizziness  -     Cardiac Monitor - 3-15 Day Adult (Cupid Only); Future    6. Left leg pain         -     Likely secondary to sciatica. Continue NSAID/ Tylenol as needed for pain. Consider referral to specialist if pain persists.        Further recommendations made pending e-consult from endo.     Follow up to est care with MD.      Future Appointments       Date Provider Specialty Appt Notes    7/8/2024 Meera Andres MD Family Medicine est care    7/9/2024  Cardiology Cardiac Monitor - 3-15 Day Adult (Cupid Only)    7/12/2024 Maddy Childs, PT Outpatient Rehab Cerebellar stroke, acute [I63.9]               Tests to Keep You Healthy    Mammogram: Met on 11/30/2023  Eye Exam: DUE  Colon Cancer Screening: DUE  Last Blood Pressure <= 139/89 (7/3/2024): Yes  Last HbA1c < 8 (06/18/2024): NO

## 2024-07-05 ENCOUNTER — TELEPHONE (OUTPATIENT)
Dept: PHARMACY | Facility: CLINIC | Age: 54
End: 2024-07-05
Payer: COMMERCIAL

## 2024-07-05 ENCOUNTER — E-CONSULT (OUTPATIENT)
Dept: PHARMACY | Facility: CLINIC | Age: 54
End: 2024-07-05
Payer: COMMERCIAL

## 2024-07-05 ENCOUNTER — PATIENT MESSAGE (OUTPATIENT)
Dept: FAMILY MEDICINE | Facility: CLINIC | Age: 54
End: 2024-07-05
Payer: COMMERCIAL

## 2024-07-05 DIAGNOSIS — E11.9 DIABETES MELLITUS WITHOUT COMPLICATION: Primary | ICD-10-CM

## 2024-07-05 RX ORDER — INSULIN ASPART 100 [IU]/ML
0-5 INJECTION, SOLUTION INTRAVENOUS; SUBCUTANEOUS
Qty: 3 ML | Refills: 1 | Status: SHIPPED | OUTPATIENT
Start: 2024-07-05 | End: 2025-01-01

## 2024-07-05 NOTE — PROGRESS NOTES
[facility]  Office Visit Note     SUBJECTIVE:   HPI: Meg Lockhart  is a 54 y.o. female here to establish care    Medical conditions: diabetic neuropathy, history of ischemic stroke, HTN, HLD, diabetes, vitamin D deficiency    Meds: amitriptyline 10 mg nightly, aspirin, atorvastatin 80 mg daily, chlorthalidone 25 mg, famotidine 40 mg, lantus 20 units BID, ativan 1 mg daily PRN     Last labs 6/2/2024   Hgb 11.9   Glucose 336  A1c 14.8 (6/18/2024)     Reports constipation.    Patient states that she had rectal bleeding few days ago from severe constipation.    Patient has been using Metamucil, stool softener, sometimes Dulcolax   Last BM 3 days ago    Patient also reports having urinary urgency but having decreased urine    Diabetes:  Uncontrolled   Currently taking Lantus 20 units b.i.d.   Fasting morning glucose between 86 and 110 but sometimes he goes down to 55  Patient state that she has not been feeling well since her sugar level was running around 400-500 before she got on Lantus   Patient is supposed to take short-acting insulin with meals, however, NovoLog is not covered with her insurance and she has not been taking anything  Previously on metformin 1500 mg daily but states that metformin does not help her with her diabetes  Patient has an appointment with endocrinology in September    No history of colon cancer in family   History of lung cancer and breast cancer in family    For her recent stroke and ongoing vertigo, patient has neurology appointment July 25th   Also has a cardiology appointment this Friday    Office Visit on 07/08/2024   Component Date Value Ref Range Status    Color, POC UA 07/08/2024 Yellow  Yellow, Straw, Colorless Final    Clarity, POC UA 07/08/2024 Cloudy (A)  Clear Final    Glucose, POC UA 07/08/2024 Negative  Negative Final    Bilirubin, POC UA 07/08/2024 Negative  Negative Final    Ketones, POC UA 07/08/2024 Negative  Negative Final    Spec Grav POC UA  07/08/2024 1.020  1.005 - 1.030 Final    Blood, POC UA 07/08/2024 Moderate (A)  Negative Final    pH, POC UA 07/08/2024 5.0  5.0 - 8.0 Final    Protein, POC UA 07/08/2024 Trace (A)  Negative Final    Urobilinogen, POC UA 07/08/2024 0.2  <=1.0 Final    Nitrite, POC UA 07/08/2024 Positive (A)  Negative Final    WBC, POC UA 07/08/2024 Moderate (A)  Negative Final   No results displayed because visit has over 200 results.      Admission on 06/02/2024, Discharged on 06/02/2024   Component Date Value Ref Range Status    WBC 06/02/2024 6.24  3.90 - 12.70 K/uL Final    RBC 06/02/2024 4.06  4.00 - 5.40 M/uL Final    Hemoglobin 06/02/2024 11.9 (L)  12.0 - 16.0 g/dL Final    Hematocrit 06/02/2024 36.6 (L)  37.0 - 48.5 % Final    MCV 06/02/2024 90  82 - 98 fL Final    MCH 06/02/2024 29.3  27.0 - 31.0 pg Final    MCHC 06/02/2024 32.5  32.0 - 36.0 g/dL Final    RDW 06/02/2024 12.6  11.5 - 14.5 % Final    Platelets 06/02/2024 301  150 - 450 K/uL Final    MPV 06/02/2024 10.3  9.2 - 12.9 fL Final    Immature Granulocytes 06/02/2024 0.2  0.0 - 0.5 % Final    Gran # (ANC) 06/02/2024 4.1  1.8 - 7.7 K/uL Final    Immature Grans (Abs) 06/02/2024 0.01  0.00 - 0.04 K/uL Final    Lymph # 06/02/2024 1.8  1.0 - 4.8 K/uL Final    Mono # 06/02/2024 0.3  0.3 - 1.0 K/uL Final    Eos # 06/02/2024 0.1  0.0 - 0.5 K/uL Final    Baso # 06/02/2024 0.03  0.00 - 0.20 K/uL Final    nRBC 06/02/2024 0  0 /100 WBC Final    Gran % 06/02/2024 65.2  38.0 - 73.0 % Final    Lymph % 06/02/2024 28.0  18.0 - 48.0 % Final    Mono % 06/02/2024 5.1  4.0 - 15.0 % Final    Eosinophil % 06/02/2024 1.0  0.0 - 8.0 % Final    Basophil % 06/02/2024 0.5  0.0 - 1.9 % Final    Differential Method 06/02/2024 Automated   Final    Sodium 06/02/2024 139  136 - 145 mmol/L Final    Potassium 06/02/2024 4.0  3.5 - 5.1 mmol/L Final    Chloride 06/02/2024 104  95 - 110 mmol/L Final    CO2 06/02/2024 25  23 - 29 mmol/L Final    Glucose 06/02/2024 336 (H)  70 - 110 mg/dL Final    BUN  06/02/2024 12  6 - 20 mg/dL Final    Creatinine 06/02/2024 1.1  0.5 - 1.4 mg/dL Final    Calcium 06/02/2024 9.3  8.7 - 10.5 mg/dL Final    Total Protein 06/02/2024 6.7  6.0 - 8.4 g/dL Final    Albumin 06/02/2024 3.3 (L)  3.5 - 5.2 g/dL Final    Total Bilirubin 06/02/2024 0.3  0.1 - 1.0 mg/dL Final    Alkaline Phosphatase 06/02/2024 81  55 - 135 U/L Final    AST 06/02/2024 13  10 - 40 U/L Final    ALT 06/02/2024 16  10 - 44 U/L Final    eGFR 06/02/2024 >60  >60 mL/min/1.73 m^2 Final    Anion Gap 06/02/2024 10  8 - 16 mmol/L Final    Lipase 06/02/2024 39  4 - 60 U/L Final    Specimen UA 06/02/2024 Urine, Clean Catch   Final    Color, UA 06/02/2024 Yellow  Yellow, Straw, Amelia Final    Appearance, UA 06/02/2024 Hazy (A)  Clear Final    pH, UA 06/02/2024 6.0  5.0 - 8.0 Final    Specific Gravity, UA 06/02/2024 1.020  1.005 - 1.030 Final    Protein, UA 06/02/2024 1+ (A)  Negative Final    Glucose, UA 06/02/2024 4+ (A)  Negative Final    Ketones, UA 06/02/2024 Negative  Negative Final    Bilirubin (UA) 06/02/2024 Negative  Negative Final    Occult Blood UA 06/02/2024 Trace (A)  Negative Final    Nitrite, UA 06/02/2024 Negative  Negative Final    Urobilinogen, UA 06/02/2024 Negative  <2.0 EU/dL Final    Leukocytes, UA 06/02/2024 2+ (A)  Negative Final    QRS Duration 06/02/2024 82  ms Final    [facility] QTC Calculation 06/02/2024 478  ms Final    RBC, UA 06/02/2024 2  0 - 4 /hpf Final    WBC, UA 06/02/2024 >100 (H)  0 - 5 /hpf Final    WBC Clumps, UA 06/02/2024 Few (A)  None-Rare Final    Bacteria 06/02/2024 Many (A)  None-Occ /hpf Final    Yeast, UA 06/02/2024 None  None Final    Squam Epithel, UA 06/02/2024 6  /hpf Final    Hyaline Casts, UA 06/02/2024 0  0-1/lpf /lpf Final    Microscopic Comment 06/02/2024 SEE COMMENT   Final    Urine Culture, Routine 06/02/2024  (A)   Final                    Value:ESCHERICHIA COLI  >100,000 cfu/ml           Current Outpatient Medications on File Prior to Visit   Medication Sig Dispense  Refill    aspirin (ECOTRIN) 81 MG EC tablet Take 1 tablet (81 mg total) by mouth once daily. 30 tablet 0    atorvastatin (LIPITOR) 80 MG tablet Take 1 tablet (80 mg total) by mouth once daily. 30 tablet 0    blood sugar diagnostic Strp To check BG 4 times daily, to use with insurance preferred meter 120 strip 0    blood-glucose meter (ACCU-CHEK GUIDE GLUCOSE METER) Misc use to check BG 4 times a day 1 each 0    chlorthalidone (HYGROTEN) 25 MG Tab Take 1 tablet (25 mg total) by mouth once daily. 30 tablet 11    famotidine (PEPCID) 40 MG tablet Take 1 tablet (40 mg total) by mouth once daily. 30 tablet 11    insulin glargine U-100, Lantus, (BASAGLAR KWIKPEN U-100 INSULIN) 100 unit/mL (3 mL) InPn pen Inject 20 Units into the skin once daily.      lancets Misc Use to check BG four times daily 120 each 0    LORazepam (ATIVAN) 1 MG tablet Take 1 mg by mouth daily as needed for Anxiety.      [DISCONTINUED] amitriptyline (ELAVIL) 10 MG tablet Take 10 mg by mouth every evening. (Patient not taking: Reported on 7/8/2024)      [DISCONTINUED] insulin aspart U-100 (NOVOLOG) 100 unit/mL (3 mL) InPn pen Inject 0-5 Units into the skin 4 (four) times daily before meals and nightly. (Patient not taking: Reported on 7/8/2024) 3 mL 1     No current facility-administered medications on file prior to visit.     Past Medical History:   Diagnosis Date    Anxiety     Diabetes mellitus, type 2     Elevated cholesterol     GERD (gastroesophageal reflux disease)     Hypertension     PUD (peptic ulcer disease)      Past Surgical History:   Procedure Laterality Date    GASTRIC BYPASS      HYSTERECTOMY      KNEE SURGERY Left      Past Surgical History:   Procedure Laterality Date    GASTRIC BYPASS      HYSTERECTOMY      KNEE SURGERY Left      Family History   Problem Relation Name Age of Onset    Heart disease Mother 39     Hypertension Mother 39     Heart attack Mother 39     Stroke Brother 28     Cancer Maternal Aunt      Cancer Maternal  Grandmother          lung    Heart disease Maternal Grandmother      Diabetes Paternal Grandmother      Heart disease Paternal Grandmother      Diabetes Paternal Grandfather         Marital Status:   Alcohol History:  reports no history of alcohol use.  Tobacco History:  reports that she has never smoked. She has never been exposed to tobacco smoke. She has never used smokeless tobacco.  Drug History:  reports no history of drug use.    Health Maintenance Topics with due status: Not Due       Topic Last Completion Date    Mammogram 11/30/2023    Lipid Panel 06/18/2024    Hemoglobin A1c 06/18/2024    High Dose Statin 07/08/2024    Influenza Vaccine Not Due       There is no immunization history on file for this patient.    Review of patient's allergies indicates:   Allergen Reactions    Penicillins Itching and Anaphylaxis    Sulfa (sulfonamide antibiotics) Anaphylaxis    Meperidine Itching       Current Outpatient Medications:     aspirin (ECOTRIN) 81 MG EC tablet, Take 1 tablet (81 mg total) by mouth once daily., Disp: 30 tablet, Rfl: 0    atorvastatin (LIPITOR) 80 MG tablet, Take 1 tablet (80 mg total) by mouth once daily., Disp: 30 tablet, Rfl: 0    blood sugar diagnostic Strp, To check BG 4 times daily, to use with insurance preferred meter, Disp: 120 strip, Rfl: 0    blood-glucose meter (ACCU-CHEK GUIDE GLUCOSE METER) [facility], use to check BG 4 times a day, Disp: 1 each, Rfl: 0    chlorthalidone (HYGROTEN) 25 MG Tab, Take 1 tablet (25 mg total) by mouth once daily., Disp: 30 tablet, Rfl: 11    famotidine (PEPCID) 40 MG tablet, Take 1 tablet (40 mg total) by mouth once daily., Disp: 30 tablet, Rfl: 11    insulin glargine U-100, Lantus, (BASAGLAR KWIKPEN U-100 INSULIN) 100 unit/mL (3 mL) InPn pen, Inject 20 Units into the skin once daily., Disp: , Rfl:     lancets [facility], Use to check BG four times daily, Disp: 120 each, Rfl: 0    LORazepam (ATIVAN) 1 MG tablet, Take 1 mg by mouth daily as needed for Anxiety.,  "Disp: , Rfl:     insulin lispro 200 unit/mL (3 mL) InPn, Inject 6 Units into the skin 3 (three) times daily., Disp: 3 mL, Rfl: 5    SITagliptin phosphate (JANUVIA) 25 MG Tab, Take 1 tablet (25 mg total) by mouth once daily., Disp: 90 tablet, Rfl: 3    Review of Systems   Constitutional:  Negative for chills and fever.   Gastrointestinal:  Positive for constipation. Negative for abdominal pain, nausea and vomiting.   Genitourinary:  Positive for urgency. Negative for bladder incontinence and dysuria.   Neurological:  Positive for vertigo.       OBJECTIVE:      Vitals:    07/08/24 0831   BP: 130/68   BP Location: Right arm   Patient Position: Sitting   BP Method: Medium (Manual)   Pulse: 86   Resp: 16   SpO2: 98%   Weight: 80.3 kg (177 lb 0.5 oz)   Height: 5' 5" (1.651 m)     Physical Exam  Constitutional:       General: She is not in acute distress.     Appearance: She is not ill-appearing or toxic-appearing.   HENT:      Head: Normocephalic and atraumatic.      Mouth/Throat:      Mouth: Mucous membranes are moist.      Pharynx: Uvula midline. No pharyngeal swelling.   Cardiovascular:      Rate and Rhythm: Normal rate and regular rhythm.   Pulmonary:      Effort: Pulmonary effort is normal. No tachypnea, bradypnea, accessory muscle usage, prolonged expiration or respiratory distress.      Breath sounds: Normal breath sounds. No stridor. No wheezing, rhonchi or rales.   Neurological:      General: No focal deficit present.      Mental Status: She is alert.      Comments: Attempted shahbaz-hallpike maneuver but unable to complete  Patient had dizziness upon laying down flat    Psychiatric:         Mood and Affect: Mood normal.         Behavior: Behavior normal.        Assessment:       1. Routine general medical examination at a health care facility    2. Diabetes mellitus without complication    3. History of ischemic stroke    4. Primary hypertension    5. Encounter for screening for malignant neoplasm of colon    6. " Urinary urgency    7. Uncontrolled type 2 diabetes mellitus with hypoglycemia without coma    8. Vertigo    9. Acute ischemic stroke    10. Constipation, unspecified constipation type        Plan:       Routine general medical examination at a health care facility    Diabetes mellitus without complication  Since lantus is a long acting, will convert her dosing from bid to daily   Plan:   Lantus 20 units nightly -- current on 20 units BID dosing and patient has hypoglycemia at times including BG 55   Short acting insulin 6 units each meal -- lispro sent in   Lantus increase by 2 units every 3 days until preprandial glucose around 140  Advise to call if glucose above 250 at home  E-visit 1 month for diabetes  Endocrinology follow up in September - [facility] E-Visit  -     insulin lispro 200 unit/mL (3 mL) InPn; Inject 6 Units into the skin 3 (three) times daily.  Dispense: 3 mL; Refill: 5  -     SITagliptin phosphate (JANUVIA) 25 MG Tab; Take 1 tablet (25 mg total) by mouth once daily.  Dispense: 90 tablet; Refill: 3    History of ischemic stroke  Neurology appointment July 25   Continue  with atorvastatin 80 mg, aspirin 81 mg daily     Primary hypertension  BP at goal  Continue with chlorthalidone 25 mg daily     Encounter for screening for malignant neoplasm of colon  -     Cologuard Screening (Multitarget Stool DNA); Future; Expected date: 07/08/2024  Reviewed all three methods of colon cancer screening including iFOBT, cologuard, and colonoscopy   Informed the patient that if iFOBT or cologuard came back abnormal, then colonoscopy needs to be done for complete evaluation. Patient voiced understanding.      Urinary urgency  -     POCT Urinalysis(Instrument)  -     Urinalysis Microscopic    Vertigo  Advised the patient to take time when getting up from sitting to standing position as well as with rapid head movement  Further evaluation with neurology on 7/25     Constipation, unspecified constipation type  Reviewed  the below tips with the patients   - Make sure to keep up with hydration   - Increase fiber intake -- try Benefiber (green tub) twice a day or fiber gummies over the counter  - Also try Miralax 17 g twice a day until your bowel movement normalizes. Afterwards, try 17 g once a day scheduled or as needed  - If you continue to struggle with constipation, please return to our clinic for further evaluation       Counseled on age and gender appropriate medical preventative services, including cancer screenings, immunizations, overall nutritional health, need for a consistent exercise regimen and an overall push towards maintaining a vigorous and active lifestyle.      Follow up in 1 month via e-visit for diabetes  Follow up in 3 months for chronic medical conditions       Meera Andres M.D.  7/8/2024    This note was created using [employer] voice recognition software that occasionally misinterprets phrases or words

## 2024-07-05 NOTE — CONSULTS
[facility]  Response for E-Consult     Patient Name: Meg Lockhart  MRN: [id number]  Primary Care Provider: Cecelia, Primary Doctor   Requesting Provider: Katya Gonzalez PA-C  E-Consult to Pharmacy  Consult performed by: Oswald Garibay PharmD  Consult ordered by: Katya Gonzalez PA-C  Reason for consult: Cost savings/Formulary medication question  Assessment/Recommendations:     The novolog is covered by the patient's insurance but the insurance plan requires mail order for the Novolog.     The patient does have the option to opt out of mail order but her copay will be more expensive. If the patient wishes to opt out of mail order, she needs to call [phone].     If she would like mail order for the cheapest copay the order will need to be sent to [facility] Mail order pharmacy.         Total time of Consultation: 40 minute    I did not speak to the requesting provider verbally about this.     *This eConsult is based on the clinical data available to me and is furnished without benefit of a physical examination. The eConsult will need to be interpreted in light of any clinical issues or changes in patient status not available to me at the time of filing this eConsults. Significant changes in patient condition or level of acuity should result in immediate formal consultation and reevaluation. Please alert me if you have further questions.    Thank you for this eConsult referral.     Oswald Garibay PharmD  [facility]

## 2024-07-05 NOTE — TELEPHONE ENCOUNTER
"E consult with pharmacist recommendations as follows:    "The novolog is covered by the patient's insurance but the insurance plan requires mail order for the Novolog.      The patient does have the option to opt out of mail order but her copay will be more expensive. If the patient wishes to opt out of mail order, she needs to call [phone].      If she would like mail order for the cheapest copay the order will need to be sent to [facility] Mail order pharmacy. "    Novolog has been sent to [facility] mail order.   "

## 2024-07-08 ENCOUNTER — OFFICE VISIT (OUTPATIENT)
Dept: FAMILY MEDICINE | Facility: CLINIC | Age: 54
End: 2024-07-08
Payer: COMMERCIAL

## 2024-07-08 VITALS
SYSTOLIC BLOOD PRESSURE: 130 MMHG | HEART RATE: 86 BPM | WEIGHT: 177 LBS | BODY MASS INDEX: 29.49 KG/M2 | OXYGEN SATURATION: 98 % | DIASTOLIC BLOOD PRESSURE: 68 MMHG | HEIGHT: 65 IN | RESPIRATION RATE: 16 BRPM

## 2024-07-08 DIAGNOSIS — Z12.11 ENCOUNTER FOR SCREENING FOR MALIGNANT NEOPLASM OF COLON: ICD-10-CM

## 2024-07-08 DIAGNOSIS — I10 PRIMARY HYPERTENSION: ICD-10-CM

## 2024-07-08 DIAGNOSIS — N30.00 ACUTE CYSTITIS WITHOUT HEMATURIA: Primary | ICD-10-CM

## 2024-07-08 DIAGNOSIS — R42 VERTIGO: ICD-10-CM

## 2024-07-08 DIAGNOSIS — R39.15 URINARY URGENCY: ICD-10-CM

## 2024-07-08 DIAGNOSIS — K59.00 CONSTIPATION, UNSPECIFIED CONSTIPATION TYPE: ICD-10-CM

## 2024-07-08 DIAGNOSIS — E11.649 UNCONTROLLED TYPE 2 DIABETES MELLITUS WITH HYPOGLYCEMIA WITHOUT COMA: ICD-10-CM

## 2024-07-08 DIAGNOSIS — E11.9 DIABETES MELLITUS WITHOUT COMPLICATION: ICD-10-CM

## 2024-07-08 DIAGNOSIS — Z86.73 HISTORY OF ISCHEMIC STROKE: ICD-10-CM

## 2024-07-08 DIAGNOSIS — Z00.00 ROUTINE GENERAL MEDICAL EXAMINATION AT A HEALTH CARE FACILITY: Primary | ICD-10-CM

## 2024-07-08 LAB
BILIRUBIN, UA POC OHS: NEGATIVE
BLOOD, UA POC OHS: ABNORMAL
CLARITY, UA POC OHS: ABNORMAL
COLOR, UA POC OHS: YELLOW
GLUCOSE, UA POC OHS: NEGATIVE
KETONES, UA POC OHS: NEGATIVE
LEUKOCYTES, UA POC OHS: ABNORMAL
NITRITE, UA POC OHS: POSITIVE
PH, UA POC OHS: 5
PROTEIN, UA POC OHS: ABNORMAL
SPECIFIC GRAVITY, UA POC OHS: 1.02
UROBILINOGEN, UA POC OHS: 0.2

## 2024-07-08 PROCEDURE — 3075F SYST BP GE 130 - 139MM HG: CPT | Mod: CPTII,S$GLB,, | Performed by: STUDENT IN AN ORGANIZED HEALTH CARE EDUCATION/TRAINING PROGRAM

## 2024-07-08 PROCEDURE — 99999 PR PBB SHADOW E&M-EST. PATIENT-LVL IV: CPT | Mod: PBBFAC,,, | Performed by: STUDENT IN AN ORGANIZED HEALTH CARE EDUCATION/TRAINING PROGRAM

## 2024-07-08 PROCEDURE — 3078F DIAST BP <80 MM HG: CPT | Mod: CPTII,S$GLB,, | Performed by: STUDENT IN AN ORGANIZED HEALTH CARE EDUCATION/TRAINING PROGRAM

## 2024-07-08 PROCEDURE — 81001 URINALYSIS AUTO W/SCOPE: CPT | Performed by: STUDENT IN AN ORGANIZED HEALTH CARE EDUCATION/TRAINING PROGRAM

## 2024-07-08 PROCEDURE — 3008F BODY MASS INDEX DOCD: CPT | Mod: CPTII,S$GLB,, | Performed by: STUDENT IN AN ORGANIZED HEALTH CARE EDUCATION/TRAINING PROGRAM

## 2024-07-08 PROCEDURE — 81003 URINALYSIS AUTO W/O SCOPE: CPT | Mod: QW,S$GLB,, | Performed by: STUDENT IN AN ORGANIZED HEALTH CARE EDUCATION/TRAINING PROGRAM

## 2024-07-08 PROCEDURE — 99396 PREV VISIT EST AGE 40-64: CPT | Mod: S$GLB,,, | Performed by: STUDENT IN AN ORGANIZED HEALTH CARE EDUCATION/TRAINING PROGRAM

## 2024-07-08 PROCEDURE — 3046F HEMOGLOBIN A1C LEVEL >9.0%: CPT | Mod: CPTII,S$GLB,, | Performed by: STUDENT IN AN ORGANIZED HEALTH CARE EDUCATION/TRAINING PROGRAM

## 2024-07-08 PROCEDURE — 1111F DSCHRG MED/CURRENT MED MERGE: CPT | Mod: CPTII,S$GLB,, | Performed by: STUDENT IN AN ORGANIZED HEALTH CARE EDUCATION/TRAINING PROGRAM

## 2024-07-08 PROCEDURE — 1159F MED LIST DOCD IN RCRD: CPT | Mod: CPTII,S$GLB,, | Performed by: STUDENT IN AN ORGANIZED HEALTH CARE EDUCATION/TRAINING PROGRAM

## 2024-07-08 PROCEDURE — 1160F RVW MEDS BY RX/DR IN RCRD: CPT | Mod: CPTII,S$GLB,, | Performed by: STUDENT IN AN ORGANIZED HEALTH CARE EDUCATION/TRAINING PROGRAM

## 2024-07-08 RX ORDER — NITROFURANTOIN 25; 75 MG/1; MG/1
100 CAPSULE ORAL 2 TIMES DAILY
Qty: 10 CAPSULE | Refills: 0 | Status: SHIPPED | OUTPATIENT
Start: 2024-07-08 | End: 2024-07-09 | Stop reason: SDUPTHER

## 2024-07-09 ENCOUNTER — HOSPITAL ENCOUNTER (OUTPATIENT)
Dept: CARDIOLOGY | Facility: CLINIC | Age: 54
Discharge: HOME | End: 2024-07-09
Payer: COMMERCIAL

## 2024-07-09 DIAGNOSIS — N30.00 ACUTE CYSTITIS WITHOUT HEMATURIA: ICD-10-CM

## 2024-07-09 DIAGNOSIS — I10 PRIMARY HYPERTENSION: ICD-10-CM

## 2024-07-09 DIAGNOSIS — Z86.73 HISTORY OF ISCHEMIC STROKE: ICD-10-CM

## 2024-07-09 DIAGNOSIS — R42 DIZZINESS: ICD-10-CM

## 2024-07-09 LAB
BACTERIA #/AREA URNS AUTO: ABNORMAL /HPF
HYALINE CASTS UR QL AUTO: 69 /LPF
MICROSCOPIC COMMENT: ABNORMAL
RBC #/AREA URNS AUTO: 28 /HPF (ref 0–4)
SQUAMOUS #/AREA URNS AUTO: 36 /HPF
WBC #/AREA URNS AUTO: >100 /HPF (ref 0–5)

## 2024-07-09 RX ORDER — NITROFURANTOIN 25; 75 MG/1; MG/1
100 CAPSULE ORAL 2 TIMES DAILY
Qty: 10 CAPSULE | Refills: 0 | Status: SHIPPED | OUTPATIENT
Start: 2024-07-09 | End: 2024-07-14

## 2024-07-09 NOTE — TELEPHONE ENCOUNTER
Contacted pt via phone. Inquiring if prescriptions were sent to pharmacy. Asking if Macrobid can be sent to [facility]. Notified pt that Januvia PA was approved as well. Pt verbalized understanding

## 2024-07-09 NOTE — TELEPHONE ENCOUNTER
----- Message from Mignon Gill sent at 7/9/2024  1:50 PM CDT -----  Regarding: Refill request  Contact: pt  Type:  RX Refill Request    Who Called: pt    Refill or New Rx:SITagliptin phosphate (JANUVIA) 25 MG Tab  Antibiotic for UTI  Alternative rx for the Novolog    Preferred Pharmacy with phone number:  [facility]/pharmacy #[id number] - [place], [place] - [address]  [address] [address]  Phone: [phone] Fax: [phone]    Local or Mail Order:local    Ordering Provider:chandler    Would the patient rather a call back or a response via [facility]? Call back    Best Call Back Number:[phone]    Additional Information: SITagliptin phosphate (JANUVIA) 25 MG Tab  needs approval.  Pt is asking for an alternative for the Novolog. It costs $105 with insurance.

## 2024-07-10 ENCOUNTER — TELEPHONE (OUTPATIENT)
Dept: FAMILY MEDICINE | Facility: CLINIC | Age: 54
End: 2024-07-10
Payer: COMMERCIAL

## 2024-07-10 ENCOUNTER — PATIENT MESSAGE (OUTPATIENT)
Dept: FAMILY MEDICINE | Facility: CLINIC | Age: 54
End: 2024-07-10
Payer: COMMERCIAL

## 2024-07-10 NOTE — TELEPHONE ENCOUNTER
Jana Medellin Staff     ----- Message from Jana Medellin sent at 7/10/2024 10:43 AM CDT -----  Contact: Patient  Type:  Needs Medical Advice    Who Called:   Patient    Pharmacy name and phone #:        [id number] - [place], [place] - [address]  [address]  [place] [address]  Phone: [phone] Fax: [phone]    Would the patient rather a call back or a response via [facility]?   Call back  Best Call Back Number:   [phone]    Additional Information:   States she is at the pharmacy picking up 2 prescriptions sent in by Dr Andres (SITagliptin phosphate (JANUVIA) 25 MG Tab and Humalog) - states each prescription is over $100 - states her insurance isn't covering either prescription - states she is unable to afford this - please call - thank you

## 2024-07-11 DIAGNOSIS — E11.9 TYPE 2 DIABETES MELLITUS WITHOUT COMPLICATION, UNSPECIFIED WHETHER LONG TERM INSULIN USE: ICD-10-CM

## 2024-07-11 DIAGNOSIS — E11.9 TYPE 2 DIABETES MELLITUS WITHOUT COMPLICATION: ICD-10-CM

## 2024-07-11 NOTE — TELEPHONE ENCOUNTER
Januvia and Humalog both have coupon cards available for pt to obtain on website for cheaper cost.    Spoke to pharmacy whom advised Januvia does not have a generic or alternative listed, but advised pt can get coupon from website to drop cost to 5.00 for the Januvia. Humalog also has a coupon card available not sure of amount.    Spoke to pt informed and verbalizes understanding. Pt is going to website now to retrieve coupons.

## 2024-07-11 NOTE — PROGRESS NOTES
Subjective:    Patient ID:  Meg Lockhart is a 54 y.o. female who presents for evaluation of   Chief Complaint   Patient presents with    Palpitations    Dizziness       HPI:  3/9/22  Referred by Dr. Jasno has new patient.  She has a history of diabetes of pregnancy and hypertension.  She is type 2 diabetic for 24 years currently on insulin is a strong family history of premature heart disease can not remote her mom had a heart attack at age 38 and her brother had a stroke at age 25.  She had to TIAs June 1st and June 16th and was evaluated in the hospital.  Her bad cholesterol is high her blood pressure was 200 over 100 her sugar was high at A1c 14.  She has currently wearing a monitor until the 17th rule out atrial fibrillation     MRI of the brain reveals  At least 20 T2/FLAIR hyperintense foci in the periventricular and deep white matter including the right side of the midbrain and joaquin.  Considerations include chronic microvascular ischemia, demyelinating disease, or migraine headaches.      2308 HDL 54 -- Final result   06/18/24 2308 CHOL 140 -- Final result   06/18/24 2308 TRIG 155 Important  High Final result   06/18/24 2308 LDLCALC 55.0 Important  Low Final result   06/18/24 2308 CHOLHDL 38.6 -- Final result   06/18/24 2308 NONHDLCHOL 86 -- Final result   06/18/24 2308 TOTALCHOLEST 2.6 -- Final result         Progress Notes  Zoraida Jason MD (Physician)  Cardiology  Subjective:   Patient ID:  Meg Lockhart is a 51 y.o. female who presents for evaluation of Hypertension     PROBLEM LIST:    HTN  HLD  DM, not on insulin        HPI:    She presents today for evaluation and management of hypertension.  She has had hypertension for a number of years which has been treated with a calcium channel blocker, thiazide diuretic, and ARB.  Her blood pressure has remained controlled with readings of 160-170/.  She also has treated diabetes and hyperlipidemia.  She is not currently following a  low-sodium diet.  She underwent gastric sleeve procedure in 2019 and had lost 70 lbs. She has not been able to exercise over the past year due to ongoing weakness which she attributes to her COVID vaccination last March.  She complains of chronic fatigue and daily morning headaches.  She does snore as well as stop breathing according to her .  She has not had a sleep evaluation.  She is not taking any NSAIDs or decongestants.  She has been having ongoing issues with vomiting since her bariatric surgery and has had difficulty in the past keeping her pills down.     ECG 12/30/19:  Personally reviewed by me shows normal sinus rhythm at 77 beats per minute.            Review of patient's allergies indicates:   Allergen Reactions    Penicillins Itching and Anaphylaxis    Sulfa (sulfonamide antibiotics) Anaphylaxis    Meperidine Itching       Past Medical History:   Diagnosis Date    Anxiety     Diabetes mellitus, type 2     Elevated cholesterol     GERD (gastroesophageal reflux disease)     Hypertension     PUD (peptic ulcer disease)      Past Surgical History:   Procedure Laterality Date    GASTRIC BYPASS      HYSTERECTOMY      KNEE SURGERY Left      Social History     Tobacco Use    Smoking status: Never     Passive exposure: Never    Smokeless tobacco: Never   Substance Use Topics    Alcohol use: No    Drug use: No     Family History   Problem Relation Name Age of Onset    Heart disease Mother 39     Hypertension Mother 39     Heart attack Mother 39     Stroke Brother 28     Cancer Maternal Aunt      Cancer Maternal Grandmother          lung    Heart disease Maternal Grandmother      Diabetes Paternal Grandmother      Heart disease Paternal Grandmother      Diabetes Paternal Grandfather          Review of Systems:   Constitution: Negative for diaphoresis and fever.   HEENT: Negative for nosebleeds.    Cardiovascular: Negative for chest pain       No dyspnea on exertion       No leg swelling        No  palpitations  Respiratory: Negative for shortness of breath and wheezing.    Hematologic/Lymphatic: Negative for bleeding problem. Does not bruise/bleed easily.   Skin: Negative for color change and rash.   Musculoskeletal: Negative for falls and myalgias.   Gastrointestinal: Negative for hematemesis and hematochezia.   Genitourinary: Negative for hematuria.   Neurological: Negative for dizziness and light-headedness.   Psychiatric/Behavioral: Negative for altered mental status and memory loss.          Objective:        Vitals:    07/12/24 1049   BP: 125/84   Pulse: 85       Lab Results   Component Value Date    WBC 6.69 06/21/2024    HGB 10.8 (L) 06/21/2024    HCT 32.4 (L) 06/21/2024     06/21/2024    CHOL 140 06/18/2024    TRIG 155 (H) 06/18/2024    HDL 54 06/18/2024    ALT 12 06/21/2024    AST 16 06/21/2024     06/21/2024    K 3.8 06/21/2024     06/21/2024    CREATININE 0.8 06/21/2024    BUN 16 06/21/2024    CO2 23 06/21/2024    TSH 2.633 06/18/2024    INR 0.9 06/19/2024    HGBA1C 14.8 (H) 06/18/2024        ECHOCARDIOGRAM RESULTS  Results for orders placed during the hospital encounter of 06/18/24    Echo    Interpretation Summary    Left Ventricle: The left ventricle is normal in size. Mild septal thickening. There is mild asymmetric hypertrophy. Normal wall motion. There is normal systolic function with a visually estimated ejection fraction of 65 - 70%. There is normal diastolic function.    Right Ventricle: Normal right ventricular cavity size. Wall thickness is normal. Systolic function is normal.    Left Atrium: Agitated saline study of the atrial septum is negative, suggesting absence of intracardiac shunt at the atrial level.    IVC/SVC: Normal venous pressure at 3 mmHg.        CURRENT/PREVIOUS VISIT EKG  Results for orders placed or performed during the hospital encounter of 06/18/24   EKG 12-lead    Collection Time: 06/18/24  4:22 PM   Result Value Ref Range    QRS Duration 80 ms     [facility] QTC Calculation 445 ms    Narrative    Test Reason : R42,    Vent. Rate : 090 BPM     Atrial Rate : 090 BPM     P-R Int : 146 ms          QRS Dur : 080 ms      QT Int : 364 ms       P-R-T Axes : 049 020 015 degrees     QTc Int : 445 ms    Normal sinus rhythm  Normal ECG  When compared with ECG of 02-JUN-2024 10:40,  No significant change was found  Confirmed by Rachel SANTANA, Jayant NEWTON ([id number]) on 6/18/2024 10:11:50 PM    Referred By: [email or web address]   SELF           Confirmed By:Jayant Ramos MD     No valid procedures specified.   No results found for this or any previous visit.      Physical Exam:  CONSTITUTIONAL: No fever, no chills  HEENT: Normocephalic, atraumatic,pupils reactive to light                 NECK:  No JVD no carotid bruit  CVS: S1S2+, RRR, no murmurs,   LUNGS: Clear  ABDOMEN: Soft, NT, BS+  EXTREMITIES: No cyanosis, edema  : No negrete catheter  NEURO: AAO X 3  PSY: Normal affect      Medication List with Changes/Refills   Current Medications    ASPIRIN (ECOTRIN) 81 MG EC TABLET    Take 1 tablet (81 mg total) by mouth once daily.    ATORVASTATIN (LIPITOR) 80 MG TABLET    Take 1 tablet (80 mg total) by mouth once daily.    BLOOD SUGAR DIAGNOSTIC STRP    To check BG 4 times daily, to use with insurance preferred meter    BLOOD-GLUCOSE METER (ACCU-CHEK GUIDE GLUCOSE METER) MISC    use to check BG 4 times a day    CHLORTHALIDONE (HYGROTEN) 25 MG TAB    Take 1 tablet (25 mg total) by mouth once daily.    FAMOTIDINE (PEPCID) 40 MG TABLET    Take 1 tablet (40 mg total) by mouth once daily.    INSULIN GLARGINE U-100, LANTUS, (BASAGLAR KWIKPEN U-100 INSULIN) 100 UNIT/ML (3 ML) INPN PEN    Inject 20 Units into the skin once daily.    INSULIN LISPRO 200 UNIT/ML (3 ML) INPN    Inject 6 Units into the skin 3 (three) times daily.    LANCETS MISC    Use to check BG four times daily    LORAZEPAM (ATIVAN) 1 MG TABLET    Take 1 mg by mouth daily as needed for Anxiety.    METFORMIN (FORTAMET) 1,000 MG 24HR TABLET    Take  1,000 mg by mouth daily with breakfast.    NITROFURANTOIN, MACROCRYSTAL-MONOHYDRATE, (MACROBID) 100 MG CAPSULE    Take 1 capsule (100 mg total) by mouth 2 (two) times daily. for 5 days    SITAGLIPTIN PHOSPHATE (JANUVIA) 25 MG TAB    Take 1 tablet (25 mg total) by mouth once daily.             Assessment:       1. Cerebrovascular accident (CVA), unspecified mechanism    2. History of ischemic stroke    3. Primary hypertension    4. Diabetes mellitus without complication    5. Anemia, unspecified type    6. Family history of premature coronary heart disease    7. TIA (transient ischemic attack)         Plan:     Problem List Items Addressed This Visit          Cardiac/Vascular    Hypertension       Endocrine    Diabetes mellitus without complication    Relevant Medications    metFORMIN (FORTAMET) 1,000 mg 24hr tablet     Other Visit Diagnoses       Cerebrovascular accident (CVA), unspecified mechanism    -  Primary    History of ischemic stroke        Anemia, unspecified type        Family history of premature coronary heart disease        TIA (transient ischemic attack)                No follow-ups on file.    The patients questions were answered, they verbalized understanding, and agreed with the treatment plan.     JASMINE ALFARO MD  [facility]

## 2024-07-12 ENCOUNTER — TELEPHONE (OUTPATIENT)
Dept: FAMILY MEDICINE | Facility: CLINIC | Age: 54
End: 2024-07-12
Payer: COMMERCIAL

## 2024-07-12 ENCOUNTER — OFFICE VISIT (OUTPATIENT)
Dept: CARDIOLOGY | Facility: CLINIC | Age: 54
End: 2024-07-12
Payer: COMMERCIAL

## 2024-07-12 VITALS
WEIGHT: 179.38 LBS | OXYGEN SATURATION: 98 % | BODY MASS INDEX: 29.84 KG/M2 | HEART RATE: 85 BPM | DIASTOLIC BLOOD PRESSURE: 84 MMHG | SYSTOLIC BLOOD PRESSURE: 125 MMHG

## 2024-07-12 DIAGNOSIS — I63.9 CEREBROVASCULAR ACCIDENT (CVA), UNSPECIFIED MECHANISM: Primary | ICD-10-CM

## 2024-07-12 DIAGNOSIS — G45.9 TIA (TRANSIENT ISCHEMIC ATTACK): ICD-10-CM

## 2024-07-12 DIAGNOSIS — Z86.73 HISTORY OF ISCHEMIC STROKE: ICD-10-CM

## 2024-07-12 DIAGNOSIS — D64.9 ANEMIA, UNSPECIFIED TYPE: ICD-10-CM

## 2024-07-12 DIAGNOSIS — E11.9 DIABETES MELLITUS WITHOUT COMPLICATION: ICD-10-CM

## 2024-07-12 DIAGNOSIS — I10 PRIMARY HYPERTENSION: ICD-10-CM

## 2024-07-12 DIAGNOSIS — Z82.49 FAMILY HISTORY OF PREMATURE CORONARY HEART DISEASE: ICD-10-CM

## 2024-07-12 PROCEDURE — 99999 PR PBB SHADOW E&M-EST. PATIENT-LVL III: CPT | Mod: PBBFAC,,, | Performed by: GENERAL PRACTICE

## 2024-07-12 RX ORDER — METFORMIN HYDROCHLORIDE EXTENDED-RELEASE TABLETS 1000 MG/1
1000 TABLET, FILM COATED, EXTENDED RELEASE ORAL
COMMUNITY

## 2024-07-12 NOTE — TELEPHONE ENCOUNTER
Spoke to pharmacist whom advised pt went to pharmacist with [employer] code and not  coupon.   Pharmacist also explained that with the dose pt is on and the amt of pens in box she is getting 67 days supply due to being unable to break up boxes. This will cost more as well. Pharmacist also advised if she was switched to the humalog 100 but a higher daily unit is would cut patients cost in half.    I spoke to pt and assisted with getting coupons for all meds. Pt verbalizes understanding and will try coupons prior to changing meds.    Pt does not want meds from mail order pharmacy

## 2024-07-12 NOTE — TELEPHONE ENCOUNTER
----- Message from Jana Medellin sent at 7/12/2024 10:34 AM CDT -----  Contact: Jay from [facility]  Type:  Pharmacy Calling to Clarify an RX    Name of Caller:  Jay from [facility]    Pharmacy Name:       [facility] - [place], [place] - [address]  [address]  [address] [address]  Phone: [phone] Fax: [phone]    Prescription Name:  insulin lispro 200 unit/mL (3 mL) InPn     What do they need to clarify?:  Alternative    Best Call Back Number:  [phone] Ref # [id number] - Jay    Additional Information:  States insurance doesn't cover the Novalog - states would like to get permission to dispense an alternative - please call - thank you

## 2024-07-16 ENCOUNTER — CLINICAL SUPPORT (OUTPATIENT)
Dept: REHABILITATION | Facility: HOSPITAL | Age: 54
End: 2024-07-16
Payer: COMMERCIAL

## 2024-07-16 DIAGNOSIS — I63.9 CEREBELLAR STROKE, ACUTE: ICD-10-CM

## 2024-07-16 DIAGNOSIS — Z74.09 IMPAIRED FUNCTIONAL MOBILITY, BALANCE, GAIT, AND ENDURANCE: Primary | ICD-10-CM

## 2024-07-16 PROCEDURE — 97162 PT EVAL MOD COMPLEX 30 MIN: CPT | Mod: PO

## 2024-07-18 ENCOUNTER — CLINICAL SUPPORT (OUTPATIENT)
Dept: REHABILITATION | Facility: HOSPITAL | Age: 54
End: 2024-07-18
Payer: COMMERCIAL

## 2024-07-18 ENCOUNTER — TELEPHONE (OUTPATIENT)
Dept: FAMILY MEDICINE | Facility: CLINIC | Age: 54
End: 2024-07-18
Payer: COMMERCIAL

## 2024-07-18 DIAGNOSIS — Z74.09 IMPAIRED FUNCTIONAL MOBILITY, BALANCE, GAIT, AND ENDURANCE: Primary | ICD-10-CM

## 2024-07-18 PROCEDURE — 97530 THERAPEUTIC ACTIVITIES: CPT | Mod: PO,CQ

## 2024-07-18 PROCEDURE — 97110 THERAPEUTIC EXERCISES: CPT | Mod: PO,CQ

## 2024-07-18 NOTE — TELEPHONE ENCOUNTER
----- Message from Patricio Nicolas sent at 7/18/2024 11:12 AM CDT -----  Type: Needs Medical Advice  Who Called:  pt  Best Call Back Number: [phone]    Additional Information: Pt is calling the office needs to speak with the nurse call is urgent regarding prior authorization for pt script they have been calling for 3 days pt levels are chemically unbalanced blood pressure has went up to 400 need prior authorization on script insulin lispro 200 unit/mL (3 mL) InPn needs to be sent to [facility].Please call back and advise.

## 2024-07-18 NOTE — PLAN OF CARE
[facility]  Physical Therapy Neurological Rehabilitation Initial Evaluation       Name: Meg Lockhart  Clinic Number: [id number]    Therapy Diagnosis:   Encounter Diagnoses   Name Primary?    Cerebellar stroke, acute     Impaired functional mobility, balance, gait, and endurance Yes     Physician: Mitra Mariee FNP    Physician Orders: PT Eval and Treat   Medical Diagnosis from Referral:   Diagnosis   I63.9 (ICD-10-CM) - Cerebellar stroke, acute     Evaluation Date: 7/16/2024  Authorization Period Expiration: 12/31/24  Plan of Care Expiration: 9/10/24  Progress Note Due: 8/16/24  Visit # / Visits authorized: 1/ 1  FOTO: 1/5    Precautions: Standard, Diabetes, and Fall     Time In: 9:35 am   Time Out: 10:15 am   Total Billable Time: 40 minutes      Subjective     Medical History:   Past Medical History:   Diagnosis Date    Anxiety     Diabetes mellitus, type 2     Elevated cholesterol     GERD (gastroesophageal reflux disease)     Hypertension     PUD (peptic ulcer disease)        Surgical History:   Meg Lockhart  has a past surgical history that includes Hysterectomy; Gastric bypass; and Knee surgery (Left).    Medications:   Meg has a current medication list which includes the following prescription(s): aspirin, atorvastatin, blood sugar diagnostic, blood-glucose meter, chlorthalidone, famotidine, basaglar kwikpen u-100 insulin, insulin lispro, lancets, lorazepam, metformin, and sitagliptin phosphate.    Allergies:   Review of patient's allergies indicates:   Allergen Reactions    Penicillins Itching and Anaphylaxis    Sulfa (sulfonamide antibiotics) Anaphylaxis    Meperidine Itching        Imaging:   CTA head and neck  6/18/24  Impression:     No appreciable acute abnormality on CTA.     No high-grade stenosis or major vessel occlusion.     MRA brain without contrast 6/18/24  Impression:     No large vessel occlusion.    MRI brain without contrast  "6/18/24  Impression:     Acute bilateral cerebellar infarcts.     At least 20 T2/FLAIR hyperintense foci in the periventricular and deep white matter including the right side of the midbrain and joaquin.  Considerations include chronic microvascular ischemia, demyelinating disease, or migraine headaches.     CT head without contrast 6/2/24  Impression:     No intracranial hemorrhage.  No evidence for recent ischemia, noting however that MRI could add sensitivity in an acute setting.       Date of onset: June 1 2024 first CVA   History of current condition     Pt reports she goes by "Meg" despite EPIC chart saying she goes by "Vandana" her middle name. Pt reports she has had 2 ischemic strokes, one  June 1, 2024, and one June 16, 2024. Pt reports her left foot "just does its own thing". Pt ports she sees the neurologist on 7/25/24. Pt reports she went straight home after hospital and has been home since. Pt reports she has been having trouble with balance at home. Pt reports she just recently got off heart monitor to see if she has A fib. Pt reports she has had 2 falls since being home. Reports she has " episodes" of feeling room sinning and losing balance and always loses balance to the right. Pt reports her vision has been blurry since the stroke also with double vision. Pt reports brain fog also but also reports she has had diabetes for 25 years. Pt reports she would like occupational therapy and speech therapy at this time. Pt reports the doctor said that imaging said   "I have been having strokes for years".           ED note 6/2/24  Patient is a 54-year-old female who presents with multiple complaints. She has past medical history significant for anxiety, diabetes, GERD, hypertension and vertigo. She reports dizziness over the last week that has progressively worsened. She woke up this morning and felt as if she could not raise her arms bilaterally. She feels as if she is weak and fatigued. She reports " associated nausea and vomiting. She denies visual changes. She states chronic headaches which are unchanged. She reports no speech difficulty. She states her symptoms are currently improving. She had not taken any medication for her symptoms.       HPI:   This is a 54-year-old female who has been having vertigo and unsteady gait starting June 2, 2024.  She was seen at a hospital and they were concerned that she may have a stroke but the patient left without getting an MRI.  Her symptoms include vertigo and loss of balance in the lower extremity.  She also says that she feels burning sensation in the lower extremity.  She has a history of hypertension and diabetes.  There are significant history of stroke in her family.  A stroke alert was ordered and an MRI was followed showing an acute bilateral cerebellar infarct.  MRA was also performed which did not show significant occlusion.  Neurology was consulted and recommended inpatient admission.  Patient otherwise awake alert and oriented.  No nausea or vomiting.  Complained of bilateral lower extremity weakness.     * No surgery found *       Hospital Course:   54-year-old female was admitted with CVA.  Stroke protocol was initiated.  Neurology was consulted.  CT of head showed bilateral cerebellar hypodensities concerning for stroke.  MRI of brain showed acute ischemic stroke in bilateral cerebellar hemispheres, mild to moderate WMD.  CTA of H/N without LVO or significant stenosis.  Echo: EF 65-70%, negative bubble.  MRA of brain without significant stenosis or LVO.  She was started on aspirin and high-intensity statin.  Hypercoagulable panel was ordered by neurology: protein C, protein S, phosphatidylserine IgM and IgG, anticardiolipin, beta glycoprotein IgM and IgG, factor 5 Leiden, prothrombin 2 mutation, MTHFR mutation, lipoprotein a, factor 8, homocysteine, antithrombin 3 and is currently pending.  She was evaluated by PT and OT and no therapy was recommended.   "She has no focal weakness.  She was also evaluated by speech therapy and was found to have mild cognitive linguistic impairment, outpatient speech therapy was recommended.  Outpatient PT, OT, and ST were ordered.  Patient was hyperglycemic on arrival.  Hemoglobin A1c was 14.8.  Patient reports that she has been only taking metformin, but she had taken many other injectables and insulin in the past, unsure why she was taken off of these.  She was started on Lantus twice a day and short-acting sliding scale insulin while inpatient with good blood sugar control.  Patient reports that she lost her glucometer.  New glucometer was ordered with strips and lancets.  Discussed importance of blood sugar control and checking blood sugar before meals and at bedtime.  Registered dietitian saw and evaluated patient, gave instructions regarding diabetic diet.  Referral was sent for endocrinology outpatient.  Patient also requesting new PCP which was arranged by case management.  Neurology recommended outpatient cardiology follow up for an outpatient Holter monitor to assess for arrhythmias.  Patient was monitored on telemetry throughout hospitalization and remained normal sinus rhythm.  Referral placed for cardiology follow up outpatient.  Patient was discussed with Dr. Franco Christianson, patient to follow up with her outpatient in 1 week.  Plan of care was discussed with patient and verbalized understanding.  All questions and concerns addressed.  Patient was seen and examined on day of discharge.     Prior Therapy:  no  Social History: lives with family.   Falls: "a few times"  A lot of near falls.   DME: none  Home Environment:  2 story house 13 stairs to second floor.  bedrooms are upstairs.  Left handrail going up  Exercise Routine / History: NA   Family Present at time of Eval:  no  Occupation:  logistics/ recpetionist  Prior Level of Function: independent  Current Level of Function:   When pt gets " dizzy spells" family has to " "help her up stairs, get into bed etc.family helps with cooking etc.   Driving: yes    Pain:  Current 0/10, worst 7/10, best 0/10   Back pain     Pts goals: "to move better and better balance"    Objective     Observation: pleasant and cooperative   Speech: normal.    Mental status: pt reports difficulty with memory, "mixing things up". Recommending speech therapy Evaluation for formal assessment.   Appearance: Casually dressed  Behavior:  calm and cooperative  Attention Span and Concentration:  Easily distracted    Posture Alignment :slouched posture    Dominant hand:  right     Skin integrity:  Intact    Visual/Auditory: pt reports blurry vision/ double vision since stroke.       ROM:   GROSS AROM/PROM  UPPER EXTREMITY  (R) UE: WFLs  (L) UE: WFLs  LOWER EXTREMITY  (R) LE: WFLs  (L) LE: WFLs    BP: 141/91 mmhg 80 bpm. Repotrs usually lower.      Lower Extremity Strength  Right LE  Left LE    Hip flexion:  4/5 Hip flexion: 4/5   Knee extension: 4+/5 Knee extension: 4-/5   Knee flexion: 4/5 Knee flexion: 4/5   Ankle dorsiflexion:  4+/5 Ankle dorsiflexion: 4/5   Ankle plantarflexion:  4-/5 Ankle plantarflexion: 4-/5     Upper extremity strength: recommending formal occupational therapy evaluation for formal assessment.     Coordination: impaired    Sensation:  both feet and both hands numb from " years of neuropathy"      Functional Mobility   Evaluation    Bed mobility:  Mod I   Supine to sit: Mod I   Sit to supine:  Mod I   Rolling:  Mod I   Transfers to bed: Supervision    Sit to stand Supervision    Stand pivot:   Supervision    Car transfers: Supervision    Wheelchair mobility:  NA   Floor transfers: Not assessed at this time           Evaluation   Single Limb Stance right  lower extremity  Not assessed at this time   Single Limb Stance Left  lower extremity  Not assessed at this time.   (<10 sec = HIGH FALL RISK)   5 times sit to stand 24.3 seconds no upper extremity support   TUG 19.7  seconds no assistive " device    Self selected walking speed (10MWT) 0.61 m/sec (6m/9.86s)  no assistive device    Jaeger Balance Scale 32/56 no assistive device        Timed up and go score >14 seconds indicates risk for falls in older stroke patients (Parmjit et al, 2006)    Independent Community Ambulator > 1.4 m/s   Mod I Community Ambulator 1.2-1.4 m/s   SPV/SBA Community Ambulator 0.8-1.2 m/s   Limited Community Ambulator 0.4-0.8 m/s   Household Ambulator < 0.4 m/s       5 x sit<>stand  >12 sec= fall risk for general elderly  >16 sec= fall risk for Parkinson's disease  >10 sec= balance/vestibular dysfunction (<61 y/o)  >14.2 sec= balance/vestibular dysfunction (>61 y/o)  >12 sec= fall risk for CVA    Gait     Evaluation   AD used:   none   Assistance  Supervision    Distance  100 ft       GAIT DEVIATIONS:   Meg displays the following deviations with ambulation: slow  pace, min/ mod sway, decreased step length,    Impairments contributing to deviations: impaired motor control, impaired strength, impaired endurance, gait instability, balance deficits.       - Stairs: 4 stairs bilateral handrails step to pattern supervision       JAEGER Assessment  1. Sitting to Standin - able to stand without using hands and stabilize independently  2. Standing Unsupported: 3 - able to stand 2 minutes with supervision  3. Sitting Unsupported: 4 - able to sit safely and securely 2 minutes  4. Standing to Sittin - sits safely with minimal use of hands  5. Pivot Transfer: 4 - able to trnasfer safely with minor use of hands  6. Standing with Eyes Closed: 3 - able to stand 10 seconds with supervision  7. Standing with Feet Together: 3 - able to place feet together independently and stand for 1 minute with supervision  8. Reaching Forward with Outstretched Arm: 2 - can reach forward 5 cm/2 inches safely  9. Retrieving Object from Floor: 3 - able to pick slipper but needs supervision  10. Turning to Look Behind: 2 - turns sideways only but  maintains balance  11. Turning 360 Degrees: 0 - needs assistance while turning (did not want to perform . Fearful of dizziness)  12. Placing Alternate Foot on Step: 0 - needs assist to keep from falling/unable to try  13. Standing with One Foot in Front: 0 - Looses balance while stepping or standing  14. Standing on One Foot: 0 - unable to try or needs assistance to prevent fall  Total: 32  Maximum: 56  (moderate fall risk)    0-20= high fall risk   21-40 moderate fall risk   41-56 low fall risk     Fall risk cut-off scores:   Elderly and History of falls: <51/56   Elderly and No history of falls: <42/56   CVA: <45/56       Endurance Deficit: yes    PT Evaluation Completed? Yes      CMS Impairment/Limitation/Restriction for FOTO Intake Survey    Therapist reviewed FOTO scores for Meg Lockhart on 7/16/2024.   FOTO documents entered into [employer] - see Media section.    Limitation Score: 59         TREATMENT   Treatment not performed today secondary to time constraints     Total Treatment time separate from Evaluation: 0 minutes      Patient Education and Home Exercises     Education provided:   - educated about role of PT in outpatient rehab. Educated about plan of care. Educated about attendance policy. Reports understanding and has no questions at this time.       Assessment   Meg is a 54 y.o. female referred to outpatient Physical Therapy with a medical diagnosis of cerebellar stroke. Pt presents to PT with the following impairments leading to his/her functional decline: impaired strength, impaired endurance, gait instability, balance deficits, vertigo, coordination deficits. Pt is a moderate fall risk based on Carreno Balance scale, is a limited community ambulator based on gait speed, and scored within a fall risk on 5x sit to stand. Pt requesting occupational therapy and speech therapy orders, therapist will message MD to get orders today. Pt remains motivated to improve and a good candidate for PT.          Pt prognosis is Good.   Pt will benefit from skilled outpatient Physical Therapy to address the deficits stated above and in the chart below, provide pt/family education, and to maximize pt's level of independence.     Plan of care discussed with patient: Yes  Pt's spiritual, cultural and educational needs considered and patient is agreeable to the plan of care and goals as stated below:     Anticipated Barriers for therapy: vertigo      Medical Necessity is demonstrated by the following  History  Co-morbidities and personal factors that may impact the plan of care [] LOW: no personal factors / comorbidiies  [] MODERATE: 1-2 personal factors / comorbidiies  [x] HIGH: 3+ personal factors / comorbidiies    Moderate / High Support Documentation: vertigo, HTN, cerebellar strokes, DM     Examination  Body Structures and Functions, activity limitations and participation restrictions that may impact the plan of care [] LOW: addressing 1-2 elements  [] MODERATE: 3+ elements  [x] HIGH: 3+ elements (please support below)    Moderate / High Support Documentation: impaired strength, impaired endurance, gait instability, balance     Clinical Presentation [] LOW: stable  [x] MODERATE: Evolving  [] HIGH: Unstable     Decision Making/ Complexity Score: moderate         Goals:      Short Term Goals: 4 weeks Date last assessed:  Status:    1. Patient will be provided with an HEP for strength, endurance and balance.  7/16/2024   New   2.  Patient will demonstrate improve endurance and activity tolerance as evidenced by ability to perform Nu-step x 15 minutes without rests breaks. 7/16/2024  New   3. Pt will improve 5x sit to stand score from 24/3 sec to less than 18 seconds to decrease fall risk.  7/16/2024   NEW         Long Term Goals: 8 weeks  Date last assessed: Status:    1. Patient will be independent and compliant with updated HEP. 7/16/2024   New   2.  Patient will increase her   gait speed as assessed by the timed  10MWT from 0.61 m/s to 0.75 m/s to increase her safety and (I) with gait at a community level. (MDC for CVA= 0.14 m/s)    7/16/2024   New     3. The patient will demonstrate improved efficiency with functional mobility and decreased risk for falls as evidenced by an increase in her score on the Timed up and Go from 19.7 sec to 16.8 sec. (Minimal detectable change in chronic stroke = 2.9 seconds)  7/16/2024 New   4.Patient will improve her FOTO score from 59  to at least 72 for improved self perception of functional mobility.(score 0-100, high score indicates greater level of function) 7/16/2024 New   5.  Patient will demonstrated improved score on the Carreno Balance Scale from 32/56 to 39/56 to demonstrate improved functional mobility, balance and decreased risk for falls. (MDC for acute CVA= 7 points; MDC for chronic CVA =5 points)   7/16/2024   New         Plan   Plan of care Certification: 7/16/2024 to 9/10/24.    Outpatient Physical Therapy 2 times weekly for 8 weeks to include the following interventions: Gait Training, Moist Heat/ Ice, Neuromuscular Re-ed, Orthotic Management and Training, Patient Education, Therapeutic Activities, and Therapeutic Exercise.     Maddy Childs, PT

## 2024-07-18 NOTE — PROGRESS NOTES
"[facility]   Physical Therapy Treatment Note      Name: Meg Ross Lowai  Clinic Number: [id number]    Therapy Diagnosis:   Encounter Diagnosis   Name Primary?    Impaired functional mobility, balance, gait, and endurance Yes     Physician: Mitra Mariee FNP    Visit Date: 7/18/2024    Physician Orders: PT Eval and Treat   Medical Diagnosis from Referral:   Diagnosis   I63.9 (ICD-10-CM) - Cerebellar stroke, acute      Evaluation Date: 7/16/2024  Authorization Period Expiration: 12/31/24  Plan of Care Expiration: 9/10/24  Progress Note Due: 8/16/24  Visit # / Visits authorized: 1/20(2)  FOTO: 1/5     Precautions:  diabetes     Time In: 0930  Time Out: 1010   Total Billable Time: 40 minutes    PTA Visit #: 1/5       Subjective     Patient reports: "I hurt all over".  She  had not been given   home exercise program.  Response to previous treatment: no problems stated  Functional change: ongoing    Pain: 5/10  Location: "all over", Bilateral calves     Objective      Objective Measures updated at progress report unless specified.     Treatment     Vandana received the treatments listed below:      therapeutic exercises to develop strength, endurance, ROM, core exercises and flexibility for 25 minutes including:    SciFit Stepper Level 1 10 minutes with Bilateral Upper Extremities     Seated:  Bilateral ankle pumps 20 times  Long arc quads 20 times Right Left alternating  March 20 times Right Left alternating    Supine with bolster:  Lateral trunk rotation 2 minutes Right/Left   Straight leg raise 2 sets of 10 Right Left   Posterior pelvic tilt 10 times 1 second hold    therapeutic activities to improve functional performance for 15  minutes, including:    Stand at counter:  Heel raises/toe raises 10 times  Calf stretch 3x30 seconds Right Left alternating  March in place 20 times  Hip abduction 20 times Right Left alternating    Patient Education and Home Exercises       Education " provided:   - Patient provided with verbal and demonstrative instruction for all activities performed in today's session.    Written Home Exercises Provided: Patient instructed to cont prior HEP. Exercises were reviewed and Vandana was able to demonstrate them prior to the end of the session.  Vandana demonstrated good  understanding of the education provided. See Electronic Medical Record under Patient Instructions for exercises provided during therapy sessions    Assessment     Meg provided good participation and effort during today's session with treatment focused on lower extremity strengthening/core stabilization and functional mobility.  Meg tolerated activities with encouragement, was worried about exercises causing increased pain. Meg had no complaints at end of session.      Vandana Is progressing well towards her goals.   Patient prognosis is Good.     Patient will continue to benefit from skilled outpatient physical therapy to address the deficits listed in the problem list box on initial evaluation, provide pt/family education and to maximize pt's level of independence in the home and community environment.     Patient's spiritual, cultural and educational needs considered and pt agreeable to plan of care and goals.     Anticipated barriers to physical therapy: vertigo    Goals:     Short Term Goals: 4 weeks Date last assessed:  Status:    1. Patient will be provided with an HEP for strength, endurance and balance.  7/16/2024    progressing   2.  Patient will demonstrate improve endurance and activity tolerance as evidenced by ability to perform Nu-step x 15 minutes without rests breaks. 7/16/2024  progressing   3. Pt will improve 5x sit to stand score from 24/3 sec to less than 18 seconds to decrease fall risk.  7/16/2024    ongoing            Long Term Goals: 8 weeks  Date last assessed: Status:    1. Patient will be independent and compliant with updated HEP. 7/16/2024    ongoing   2.   Patient will increase her   gait speed as assessed by the timed 10MWT from 0.61 m/s to 0.75 m/s to increase her safety and (I) with gait at a community level. (MDC for CVA= 0.14 m/s)     7/16/2024    ongoing      3. The patient will demonstrate improved efficiency with functional mobility and decreased risk for falls as evidenced by an increase in her score on the Timed up and Go from 19.7 sec to 16.8 sec. (Minimal detectable change in chronic stroke = 2.9 seconds)  7/16/2024 ongoing   4.Patient will improve her FOTO score from 59  to at least 72 for improved self perception of functional mobility.(score 0-100, high score indicates greater level of function) 7/16/2024 ongoing   5.  Patient will demonstrated improved score on the Carreno Balance Scale from 32/56 to 39/56 to demonstrate improved functional mobility, balance and decreased risk for falls. (MDC for acute CVA= 7 points; MDC for chronic CVA =5 points)    7/16/2024    ongoing           Plan     Plan of care Certification: 7/16/2024 to 9/10/24.     Outpatient Physical Therapy 2 times weekly for 8 weeks to include the following interventions: Gait Training, Moist Heat/ Ice, Neuromuscular Re-ed, Orthotic Management and Training, Patient Education, Therapeutic Activities, and Therapeutic Exercise.

## 2024-07-18 NOTE — TELEPHONE ENCOUNTER
Contacted pt via phone. [facility] requesting PA for Lispro. Was given price of over $500 with insurance. Sts her glucose is over 400.     Discussed further steps with Dr. Andres. Dr Andres advised pt to increase Basaglar from 18 U BID to 19 U BID while we await PA. Pt verbalized understanding and will keep us updated with glucose levels.

## 2024-07-30 ENCOUNTER — CLINICAL SUPPORT (OUTPATIENT)
Dept: REHABILITATION | Facility: HOSPITAL | Age: 54
End: 2024-07-30
Payer: COMMERCIAL

## 2024-07-30 DIAGNOSIS — Z74.09 IMPAIRED FUNCTIONAL MOBILITY, BALANCE, GAIT, AND ENDURANCE: Primary | ICD-10-CM

## 2024-07-30 PROCEDURE — 97110 THERAPEUTIC EXERCISES: CPT | Mod: PO,CQ

## 2024-07-30 PROCEDURE — 97530 THERAPEUTIC ACTIVITIES: CPT | Mod: PO,CQ

## 2024-07-30 PROCEDURE — 97112 NEUROMUSCULAR REEDUCATION: CPT | Mod: PO,CQ

## 2024-07-30 NOTE — PROGRESS NOTES
"[facility]   Physical Therapy Treatment Note      Name: Meg Ross Lowai  Clinic Number: [id number]    Therapy Diagnosis:   Encounter Diagnosis   Name Primary?    Impaired functional mobility, balance, gait, and endurance Yes     Physician: Mitra Mariee FNP    Visit Date: 7/30/2024    Physician Orders: PT Eval and Treat   Medical Diagnosis from Referral:   Diagnosis   I63.9 (ICD-10-CM) - Cerebellar stroke, acute      Evaluation Date: 7/16/2024  Authorization Period Expiration: 12/31/24  Plan of Care Expiration: 9/10/24  Progress Note Due: 8/16/24  Visit # / Visits authorized: 2/20(3)  FOTO: 1/5     Precautions:  diabetes     Time In: 0845  Time Out: 0925  Total Billable Time: 40 minutes    PTA Visit #: 2/5       Subjective     Patient reports: without complaints of pain, states "unsteady from vertigo" which she rates a 4/10 on a dizziness scale of 0 to10.  She reports was  compliant with home exercise program.  Response to previous treatment: no problems stated  Functional change: ongoing    Pain: 0/10  Location: Not Applicable     Objective      Objective Measures updated at progress report unless specified.     Treatment     Vandana received the treatments listed below:      therapeutic exercises to develop strength, endurance and ROM for 10 minutes including:    SciFit Stepper Level 3 10 minutes with Bilateral Upper Extremities     neuromuscular re-education activities to improve: dizziness by performing vestibular habituation activities for 15 minutes. The following activities were included:    Seated:  Saccades 2x 30 second duration: horizontal and vertical  Vestibular Occular Reflex 2x 30 seconds: horizontal and vertical  Smooth Pursuit 2x 30 second duration: horizontal and vertical      (Activity time includes skilled set-up and positioning as well as therapeutic rest)    therapeutic activities to improve functional performance for 15  minutes, including:    Parallel " bars:  Weight shift 2x 1 minute on foam cushion 1 minute with reports of increased dizziness (6/10) during anterior/posterior (decreased to 4/10 with stand rest), without complaints during Right/Left     (Activity time includes skilled set-up and positioning as well as therapeutic rest)    Patient Education and Home Exercises       Education provided:   - Patient provided with verbal and demonstrative instruction for all activities performed in today's session.  - Patient instructed to take a break from activities with increased dizziness and can return to activity once dizziness subsides, stated understanding.    Written Home Exercises Provided: Yes Patient instructed to cont prior HEP. Exercises were reviewed and Vandana was able to demonstrate them prior to the end of the session.  Vandana demonstrated good  understanding of the education provided.     See Electronic Medical Record under Patient Instructions for exercises provided during therapy sessions    Assessment     Meg provided good participation and effort during today's session with treatment focused on lower extremity strengthening, functional mobility.  Meg with poor tolerance to standing balance activities, instructed to decrease range/speed to decrease dizziness with fair follow through.    Vandana Is progressing well towards her goals.   Patient prognosis is Good.     Patient will continue to benefit from skilled outpatient physical therapy to address the deficits listed in the problem list box on initial evaluation, provide pt/family education and to maximize pt's level of independence in the home and community environment.     Patient's spiritual, cultural and educational needs considered and pt agreeable to plan of care and goals.     Anticipated barriers to physical therapy: vertigo    Goals:     Short Term Goals: 4 weeks Date last assessed:  Status:    1. Patient will be provided with an HEP for strength, endurance and balance.   7/16/2024    progressing   2.  Patient will demonstrate improve endurance and activity tolerance as evidenced by ability to perform Nu-step x 15 minutes without rests breaks. 7/16/2024  progressing   3. Pt will improve 5x sit to stand score from 24/3 sec to less than 18 seconds to decrease fall risk.  7/16/2024    ongoing            Long Term Goals: 8 weeks  Date last assessed: Status:    1. Patient will be independent and compliant with updated HEP. 7/16/2024    ongoing   2.  Patient will increase her   gait speed as assessed by the timed 10MWT from 0.61 m/s to 0.75 m/s to increase her safety and (I) with gait at a community level. (MDC for CVA= 0.14 m/s)     7/16/2024    ongoing      3. The patient will demonstrate improved efficiency with functional mobility and decreased risk for falls as evidenced by an increase in her score on the Timed up and Go from 19.7 sec to 16.8 sec. (Minimal detectable change in chronic stroke = 2.9 seconds)  7/16/2024 ongoing   4.Patient will improve her FOTO score from 59  to at least 72 for improved self perception of functional mobility.(score 0-100, high score indicates greater level of function) 7/16/2024 ongoing   5.  Patient will demonstrated improved score on the Carreno Balance Scale from 32/56 to 39/56 to demonstrate improved functional mobility, balance and decreased risk for falls. (MDC for acute CVA= 7 points; MDC for chronic CVA =5 points)    7/16/2024    ongoing           Plan     Plan of care Certification: 7/16/2024 to 9/10/24.     Outpatient Physical Therapy 2 times weekly for 8 weeks to include the following interventions: Gait Training, Moist Heat/ Ice, Neuromuscular Re-ed, Orthotic Management and Training, Patient Education, Therapeutic Activities, and Therapeutic Exercise.

## 2024-07-31 NOTE — PROGRESS NOTES
"[facility]   Physical Therapy Treatment Note      Name: Meg Ross Lowai  Clinic Number: [id number]    Therapy Diagnosis:   Encounter Diagnosis   Name Primary?    Impaired functional mobility, balance, gait, and endurance Yes       Physician: Mitra Mariee FNP    Visit Date: 8/1/2024    Physician Orders: PT Eval and Treat   Medical Diagnosis from Referral:   Diagnosis   I63.9 (ICD-10-CM) - Cerebellar stroke, acute      Evaluation Date: 7/16/2024  Authorization Period Expiration: 12/31/24  Plan of Care Expiration: 9/10/24  Progress Note Due: 8/16/24  Visit # / Visits authorized: 2/20(3)  FOTO: 1/5     Precautions: standard fall   diabetes     Time In: 11:07 am  ( late arrival to PT)  Time Out: 11:48 am    Total Billable Time: 41  minutes    PTA Visit #: 0/5       Subjective     Patient reports: "I am doing ok." Pt reports she has not been able to have a normal BM since her hospitalization.pt reports her doctors know and are working with her on meds.     She reports was  compliant with home exercise program.  Response to previous treatment: no problems stated  Functional change: ongoing    Pain: 7/10   Location: low back    Objective      Objective Measures updated at progress report unless specified.     Treatment     Vandana received the treatments listed below:      therapeutic exercises to develop strength, endurance and ROM for 15  minutes including:    SciFit Stepper Level 3 15 minutes with Bilateral Upper Extremities      (Therapeutic rest breaks throughout as needed).       neuromuscular re-education activities to improve: dizziness by performing vestibular habituation activities for 26  minutes. The following activities were included:    Parallel bars:  - standing feet tandem with partial step 30 sec x 2 trials each lower extremity   - single leg stance 3-4 sec each lower extremity x 5 trials each     - standing with small light ball, tossing up and down and following with eyes " 30 sec x 3 trials. Rest breaks for dizziness to subside.   - trunk rotations left and right with eyes on ball x 30 reps. Rest breaks for dizziness to subside.       Pt ascended descended 12 stairs bilateral handrails reciprocal pattern supervision       (Activity time includes skilled set-up and positioning as well as therapeutic rest)    Patient Education and Home Exercises       Education provided:     - educated about importance of HEP at home. Reports understanding.     Written Home Exercises Provided: Yes Patient instructed to cont prior HEP. Exercises were reviewed and Vandana was able to demonstrate them prior to the end of the session.  Vandana demonstrated good  understanding of the education provided.     See Electronic Medical Record under Patient Instructions for exercises provided during therapy sessions    Assessment      Pt tolerated session well.  Pt able to tolerate 15 minutes on sci fit today with no adverse effects. Working on balance and vestibular exercises with tossing ball up and down with tracking with her eyes and with trunk rotations with eyes fixed on ball. message sent again to MD for occupational therapy and speech therapy orders. Pt remains motivated to improve and a good candidate for PT.       Vandana Is progressing well towards her goals.   Patient prognosis is Good.     Patient will continue to benefit from skilled outpatient physical therapy to address the deficits listed in the problem list box on initial evaluation, provide pt/family education and to maximize pt's level of independence in the home and community environment.     Patient's spiritual, cultural and educational needs considered and pt agreeable to plan of care and goals.     Anticipated barriers to physical therapy: vertigo    Goals:     Short Term Goals: 4 weeks Date last assessed:  Status:    1. Patient will be provided with an HEP for strength, endurance and balance.  7/16/2024    MET   2.  Patient will demonstrate  improve endurance and activity tolerance as evidenced by ability to perform Nu-step x 15 minutes without rests breaks. 8/1/24  MET   3. Pt will improve 5x sit to stand score from 24/3 sec to less than 18 seconds to decrease fall risk.  7/16/2024    ongoing            Long Term Goals: 8 weeks  Date last assessed: Status:    1. Patient will be independent and compliant with updated HEP. 7/16/2024    ongoing   2.  Patient will increase her   gait speed as assessed by the timed 10MWT from 0.61 m/s to 0.75 m/s to increase her safety and (I) with gait at a community level. (MDC for CVA= 0.14 m/s)     7/16/2024    ongoing      3. The patient will demonstrate improved efficiency with functional mobility and decreased risk for falls as evidenced by an increase in her score on the Timed up and Go from 19.7 sec to 16.8 sec. (Minimal detectable change in chronic stroke = 2.9 seconds)  7/16/2024 ongoing   4.Patient will improve her FOTO score from 59  to at least 72 for improved self perception of functional mobility.(score 0-100, high score indicates greater level of function) 7/16/2024 ongoing   5.  Patient will demonstrated improved score on the Carreno Balance Scale from 32/56 to 39/56 to demonstrate improved functional mobility, balance and decreased risk for falls. (MDC for acute CVA= 7 points; MDC for chronic CVA =5 points)    7/16/2024    ongoing           Plan     Plan of care Certification: 7/16/2024 to 9/10/24.     Outpatient Physical Therapy 2 times weekly for 8 weeks to include the following interventions: Gait Training, Moist Heat/ Ice, Neuromuscular Re-ed, Orthotic Management and Training, Patient Education, Therapeutic Activities, and Therapeutic Exercise.

## 2024-08-01 ENCOUNTER — TELEPHONE (OUTPATIENT)
Dept: FAMILY MEDICINE | Facility: CLINIC | Age: 54
End: 2024-08-01
Payer: COMMERCIAL

## 2024-08-01 ENCOUNTER — PATIENT MESSAGE (OUTPATIENT)
Dept: FAMILY MEDICINE | Facility: CLINIC | Age: 54
End: 2024-08-01
Payer: MEDICAID

## 2024-08-01 ENCOUNTER — CLINICAL SUPPORT (OUTPATIENT)
Dept: REHABILITATION | Facility: HOSPITAL | Age: 54
End: 2024-08-01
Payer: COMMERCIAL

## 2024-08-01 ENCOUNTER — DOCUMENTATION ONLY (OUTPATIENT)
Dept: REHABILITATION | Facility: HOSPITAL | Age: 54
End: 2024-08-01
Payer: COMMERCIAL

## 2024-08-01 DIAGNOSIS — Z74.09 IMPAIRED FUNCTIONAL MOBILITY, BALANCE, GAIT, AND ENDURANCE: Primary | ICD-10-CM

## 2024-08-01 PROCEDURE — 97112 NEUROMUSCULAR REEDUCATION: CPT | Mod: PO

## 2024-08-01 PROCEDURE — 97110 THERAPEUTIC EXERCISES: CPT | Mod: PO

## 2024-08-01 NOTE — PROGRESS NOTES
PT/PTA met face to face to discuss pt's treatment plan and progress towards established goals. Pt will be seen by a physical therapist minimally every 6th visit or every 30 days.    Julieta Lamb PTA

## 2024-08-01 NOTE — TELEPHONE ENCOUNTER
----- Message from Chelsey King sent at 8/1/2024 11:55 AM CDT -----  Contact: self  Pt is going to drop off paperwork for her food stamp card and needs it filled out very quickly.  In fact she is in the parking lot at the clinic getting ready to walk, please call back as soon as it is finish.  Call back at [phone] and thanks

## 2024-08-02 ENCOUNTER — TELEPHONE (OUTPATIENT)
Dept: FAMILY MEDICINE | Facility: CLINIC | Age: 54
End: 2024-08-02
Payer: COMMERCIAL

## 2024-08-02 NOTE — TELEPHONE ENCOUNTER
----- Message from Katya Gonzalez PA-C sent at 8/2/2024  3:50 PM CDT -----  No evidence of arrhythmia seen on cardiac monitor.

## 2024-08-04 DIAGNOSIS — Z86.73 HISTORY OF ISCHEMIC STROKE: Primary | ICD-10-CM

## 2024-08-06 ENCOUNTER — CLINICAL SUPPORT (OUTPATIENT)
Dept: REHABILITATION | Facility: HOSPITAL | Age: 54
End: 2024-08-06
Payer: MEDICAID

## 2024-08-06 DIAGNOSIS — Z74.09 IMPAIRED FUNCTIONAL MOBILITY, BALANCE, GAIT, AND ENDURANCE: Primary | ICD-10-CM

## 2024-08-06 PROCEDURE — 97110 THERAPEUTIC EXERCISES: CPT | Mod: PO,CQ

## 2024-08-08 ENCOUNTER — CLINICAL SUPPORT (OUTPATIENT)
Dept: REHABILITATION | Facility: HOSPITAL | Age: 54
End: 2024-08-08
Payer: MEDICAID

## 2024-08-08 DIAGNOSIS — Z74.09 IMPAIRED FUNCTIONAL MOBILITY, BALANCE, GAIT, AND ENDURANCE: Primary | ICD-10-CM

## 2024-08-08 PROCEDURE — 97110 THERAPEUTIC EXERCISES: CPT | Mod: PO,CQ

## 2024-08-13 ENCOUNTER — TELEPHONE (OUTPATIENT)
Dept: CARDIOLOGY | Facility: HOSPITAL | Age: 54
End: 2024-08-13

## 2024-08-13 NOTE — TELEPHONE ENCOUNTER
Left message on voicemail.     Patient advised, test will be at [facility] ([address]   Will need to register on the first floor at the main entrance.   Patient advised that arrival time is 7:40am.  Patient advised that she may be here about 3.5-4 hours, and may want to bring something to occupy their time, as there will be periods of waiting.    Patient advised, may take her medications prior to testing if you need to.   Advised if she needs to eat to take her medications, please keep it light, like toast and juice.    Patient advised to avoid all caffeine 12 hours prior to testing.  This includes decaf tea and coffee.    Will provide peanut butter crackers for a snack after stress test.  If patient would prefer something else, please bring a snack from home.    Wear comfortable clothing.   No lotions, oils, or powders to the upper chest area. May wear deodorant.    No metal jewelry, buttons, or zippers to the upper body.  Advised to call the office if any questions.

## 2024-08-14 ENCOUNTER — HOSPITAL ENCOUNTER (OUTPATIENT)
Dept: RADIOLOGY | Facility: HOSPITAL | Age: 54
Discharge: HOME | End: 2024-08-14
Attending: GENERAL PRACTICE
Payer: MEDICAID

## 2024-08-14 ENCOUNTER — HOSPITAL ENCOUNTER (OUTPATIENT)
Dept: CARDIOLOGY | Facility: HOSPITAL | Age: 54
Discharge: HOME | End: 2024-08-14
Attending: GENERAL PRACTICE
Payer: MEDICAID

## 2024-08-14 DIAGNOSIS — Z82.49 FAMILY HISTORY OF PREMATURE CORONARY HEART DISEASE: ICD-10-CM

## 2024-08-14 DIAGNOSIS — G45.9 TIA (TRANSIENT ISCHEMIC ATTACK): ICD-10-CM

## 2024-08-14 DIAGNOSIS — D64.9 ANEMIA, UNSPECIFIED TYPE: ICD-10-CM

## 2024-08-14 PROCEDURE — A9502 TC99M TETROFOSMIN: HCPCS | Performed by: GENERAL PRACTICE

## 2024-08-14 PROCEDURE — 78452 HT MUSCLE IMAGE SPECT MULT: CPT | Mod: 26,,, | Performed by: GENERAL PRACTICE

## 2024-08-14 PROCEDURE — 93017 CV STRESS TEST TRACING ONLY: CPT

## 2024-08-14 PROCEDURE — 93016 CV STRESS TEST SUPVJ ONLY: CPT | Mod: ,,,

## 2024-08-14 PROCEDURE — 93018 CV STRESS TEST I&R ONLY: CPT | Mod: ,,, | Performed by: GENERAL PRACTICE

## 2024-08-14 PROCEDURE — 63600175 PHARM REV CODE 636 W HCPCS: Performed by: GENERAL PRACTICE

## 2024-08-14 RX ORDER — REGADENOSON 0.08 MG/ML
0.4 INJECTION, SOLUTION INTRAVENOUS
Status: COMPLETED | OUTPATIENT
Start: 2024-08-14 | End: 2024-08-14

## 2024-08-14 RX ADMIN — TETROFOSMIN 26.7 MILLICURIE: 1.38 INJECTION, POWDER, LYOPHILIZED, FOR SOLUTION INTRAVENOUS at 08:08

## 2024-08-14 RX ADMIN — REGADENOSON 0.4 MG: 0.08 INJECTION, SOLUTION INTRAVENOUS at 08:08

## 2024-08-14 RX ADMIN — TETROFOSMIN 12.3 MILLICURIE: 1.38 INJECTION, POWDER, LYOPHILIZED, FOR SOLUTION INTRAVENOUS at 07:08

## 2024-08-15 LAB
CV PHARM DOSE: 0.4 MG
CV STRESS BASE HR: 84 BPM
DIASTOLIC BLOOD PRESSURE: 100 MMHG
EJECTION FRACTION- HIGH: 65 %
END DIASTOLIC INDEX-HIGH: 153 ML/M2
END DIASTOLIC INDEX-LOW: 93 ML/M2
END SYSTOLIC INDEX-HIGH: 71 ML/M2
END SYSTOLIC INDEX-LOW: 31 ML/M2
NUC REST DIASTOLIC VOLUME INDEX: 81
NUC REST EJECTION FRACTION: 58
NUC REST SYSTOLIC VOLUME INDEX: 34
NUC STRESS DIASTOLIC VOLUME INDEX: 58
NUC STRESS EJECTION FRACTION: 66 %
NUC STRESS SYSTOLIC VOLUME INDEX: 20
OHS CV CPX 1 MINUTE RECOVERY HEART RATE: 104 BPM
OHS CV CPX 85 PERCENT MAX PREDICTED HEART RATE MALE: 143
OHS CV CPX MAX PREDICTED HEART RATE: 168
OHS CV CPX PATIENT IS FEMALE: 1
OHS CV CPX PATIENT IS MALE: 0
OHS CV CPX PEAK DIASTOLIC BLOOD PRESSURE: 86 MMHG
OHS CV CPX PEAK HEAR RATE: 104 BPM
OHS CV CPX PEAK RATE PRESSURE PRODUCT: NORMAL
OHS CV CPX PEAK SYSTOLIC BLOOD PRESSURE: 150 MMHG
OHS CV CPX PERCENT MAX PREDICTED HEART RATE ACHIEVED: 65
OHS CV CPX RATE PRESSURE PRODUCT PRESENTING: NORMAL
RETIRED EF AND QEF - SEE NOTES: 53 %
SYSTOLIC BLOOD PRESSURE: 168 MMHG

## 2024-08-19 ENCOUNTER — PATIENT MESSAGE (OUTPATIENT)
Dept: CARDIOLOGY | Facility: CLINIC | Age: 54
End: 2024-08-19
Payer: MEDICAID

## 2024-08-22 ENCOUNTER — CLINICAL SUPPORT (OUTPATIENT)
Dept: REHABILITATION | Facility: HOSPITAL | Age: 54
End: 2024-08-22
Payer: MEDICAID

## 2024-08-22 DIAGNOSIS — Z74.09 IMPAIRED FUNCTIONAL MOBILITY, BALANCE, GAIT, AND ENDURANCE: Primary | ICD-10-CM

## 2024-08-22 PROCEDURE — 97110 THERAPEUTIC EXERCISES: CPT | Mod: PO,CQ

## 2024-08-22 NOTE — PROGRESS NOTES
[facility]   Physical Therapy Treatment Note      Name: Meg Ross Lowai  Clinic Number: [id number]    Therapy Diagnosis:   Encounter Diagnosis   Name Primary?    Impaired functional mobility, balance, gait, and endurance Yes       Physician: Mitra Mariee FNP    Visit Date: 8/22/2024    Physician Orders: PT Eval and Treat   Medical Diagnosis from Referral:   Diagnosis   I63.9 (ICD-10-CM) - Cerebellar stroke, acute      Evaluation Date: 7/16/2024  Authorization Period Expiration: 12/31/24  Plan of Care Expiration: 9/10/24  Progress Note Due: 8/16/24  Visit # / Visits authorized: 6/20(7)  FOTO: 1/5     Precautions: standard fall   diabetes     Time In: 0935 (late arrival for 0930 appt)  Time Out: 1015   Total Billable Time: 40 minutes    PTA Visit #: 3/5     Subjective     Patient reports: back doing better today  She reports was  compliant with home exercise program.  Response to previous treatment: no problems stated  Functional change: ongoing    Pain: 4/10   Location: Bilateral lower extremity, Bilateral knees    Objective      Objective Measures updated at progress report unless specified.     Treatment     Vandana received the treatments listed below:      therapeutic exercises to develop strength, endurance, range of motion and core stabilization for 10 minutes including:    Recumbent bike Level 4.0-3.5 10 minutes     therapeutic activities to improve functional performance for 30  minutes, including:    Sit<>stand 5 times: 24 seconds, 23 seconds    10 meter walk test:  0.76 meters per second, 0.76 meters per second     Timed Up and Go: 15 seconds, 14 seconds, 15 seconds (average 14.6 seconds)    Parallel bars:  Weight shift on foam cushion 2 minutes: anterior/posterior, Right/Left   Tandem weight shift on foam cushion 3x 1 minute Right Left leading    Patient Education and Home Exercises       Education provided:     - Patient provided with verbal and demonstrative instruction  for all activities performed in today's session.    Written Home Exercises Provided:  Patient instructed to cont prior HEP.      See Electronic Medical Record under Patient Instructions for exercises provided during therapy sessions    Assessment     Vandana provided good participation and effort during today's session with treatment focused on lower extremity strengthening, endurance and core stabilization. Vandana did well with increased stand activities today without complaints of increased back pain.    Vandana Is progressing well towards her goals.   Patient prognosis is Good.     Patient will continue to benefit from skilled outpatient physical therapy to address the deficits listed in the problem list box on initial evaluation, provide pt/family education and to maximize pt's level of independence in the home and community environment.     Patient's spiritual, cultural and educational needs considered and pt agreeable to plan of care and goals.     Anticipated barriers to physical therapy: vertigo    Goals:     Short Term Goals: 4 weeks Date last assessed:  Status:    1. Patient will be provided with an HEP for strength, endurance and balance.  7/16/2024    MET   2.  Patient will demonstrate improve endurance and activity tolerance as evidenced by ability to perform Nu-step x 15 minutes without rests breaks. 8/1/24  MET   3. Pt will improve 5x sit to stand score from 24/3 sec to less than 18 seconds to decrease fall risk.  7/16/2024    Ongoing        Long Term Goals: 8 weeks  Date last assessed: Status:    1. Patient will be independent and compliant with updated HEP. 7/16/2024    progressing   2.  Patient will increase her   gait speed as assessed by the timed 10MWT from 0.61 m/s to 0.75 m/s to increase her safety and (I) with gait at a community level. (MDC for CVA= 0.14 m/s)     7/16/2024    Progressing  (8/22/24 0.76 meters per second)      3. The patient will demonstrate improved efficiency with functional  mobility and decreased risk for falls as evidenced by an increase in her score on the Timed up and Go from 19.7 sec to 16.8 sec. (Minimal detectable change in chronic stroke = 2.9 seconds)  7/16/2024 Progressing  (8/22/24 - 14.6 seconds)   4.Patient will improve her FOTO score from 59  to at least 72 for improved self perception of functional mobility.(score 0-100, high score indicates greater level of function) 7/16/2024 ongoing  (8/22/24 - Score 46)   5.  Patient will demonstrated improved score on the Carreno Balance Scale from 32/56 to 39/56 to demonstrate improved functional mobility, balance and decreased risk for falls. (MDC for acute CVA= 7 points; MDC for chronic CVA =5 points)    7/16/2024    ongoing         Plan     Plan of care Certification: 7/16/2024 to 9/10/24.     Outpatient Physical Therapy 2 times weekly for 8 weeks to include the following interventions: Gait Training, Moist Heat/ Ice, Neuromuscular Re-ed, Orthotic Management and Training, Patient Education, Therapeutic Activities, and Therapeutic Exercise.     Julieta Lamb, Physical Therapist Assistant

## 2024-08-26 ENCOUNTER — PATIENT MESSAGE (OUTPATIENT)
Dept: ADMINISTRATIVE | Facility: HOSPITAL | Age: 54
End: 2024-08-26
Payer: MEDICAID

## 2024-08-26 ENCOUNTER — PATIENT OUTREACH (OUTPATIENT)
Dept: ADMINISTRATIVE | Facility: HOSPITAL | Age: 54
End: 2024-08-26
Payer: MEDICAID

## 2024-08-26 RX ORDER — ATORVASTATIN CALCIUM 80 MG/1
80 TABLET, FILM COATED ORAL DAILY
Qty: 30 TABLET | Refills: 0 | OUTPATIENT
Start: 2024-08-26

## 2024-08-26 RX ORDER — ATORVASTATIN CALCIUM 80 MG/1
80 TABLET, FILM COATED ORAL DAILY
Qty: 90 TABLET | Refills: 1 | Status: SHIPPED | OUTPATIENT
Start: 2024-08-26

## 2024-08-26 NOTE — TELEPHONE ENCOUNTER
----- Message from Kiana Coleman sent at 8/26/2024  2:58 PM CDT -----  Regarding: refill  Type: RX Refill Request     Who Called:pt      RX Name and Strength:atorvastatin (LIPITOR) 80 MG tablet      Preferred Pharmacy with phone number:[facility]/pharmacy #[id number] - [place] - [address]   Phone: [phone]           Would the patient rather a call back or a response via [facility]?call     Best Call Back Number:[phone]      Additional Information:

## 2024-08-27 ENCOUNTER — CLINICAL SUPPORT (OUTPATIENT)
Dept: REHABILITATION | Facility: HOSPITAL | Age: 54
End: 2024-08-27
Payer: MEDICAID

## 2024-08-27 DIAGNOSIS — Z74.09 IMPAIRED FUNCTIONAL MOBILITY, BALANCE, GAIT, AND ENDURANCE: Primary | ICD-10-CM

## 2024-08-27 PROCEDURE — 97110 THERAPEUTIC EXERCISES: CPT | Mod: PO,CQ

## 2024-08-27 NOTE — PROGRESS NOTES
"[facility]   Physical Therapy Treatment Note      Name: Meg Lockhart  Clinic Number: [id number]    Therapy Diagnosis:   Encounter Diagnosis   Name Primary?    Impaired functional mobility, balance, gait, and endurance Yes       Physician: Mitra Mariee FNP    Visit Date: 8/27/2024    Physician Orders: PT Eval and Treat   Medical Diagnosis from Referral:   Diagnosis   I63.9 (ICD-10-CM) - Cerebellar stroke, acute      Evaluation Date: 7/16/2024  Authorization Period Expiration: 12/31/24  Plan of Care Expiration: 9/10/24  Progress Note Due: 8/16/24  Visit # / Visits authorized: 6/20(7)  FOTO: 1/5     Precautions: standard fall   diabetes     Time In: 0935 (late arrival for 0930 appt)  Time Out: 1015   Total Billable Time: 40 minutes    PTA Visit #: 4/5     Subjective     Patient reports: increased back pain today, continued Bilateral lower extremity pain at night 2nd to "restless legs".  Patient reports was compliant with home exercise program   Response to previous treatment: no problems stated  Functional change: ongoing    Pain: 5/10   Location: low back    Objective      Objective Measures updated at progress report unless specified.     Treatment     Vandana received the treatments listed below:      therapeutic exercises to develop strength, endurance, range of motion  for 15 minutes including:    Recumbent bike Level 4.0-3.5 10 minutes     (Activity time includes skilled set-up and positioning as well as therapeutic rest)    therapeutic activities to improve functional performance for 25 minutes, including:    Parallel bars:  Weight shift on foam cushion 2 minutes: anterior/posterior, Right/Left   Static stand 1 minute with minimal sway  Stand with Feet apart Eyes closed 15 seconds x3 minimal sway  Tandem weight shift on blue and green thera discs  3x 1 minute Right Left leading    Sit<>stand 5 times: 27 seconds, 27 seconds, 27 seconds    (Activity time includes skilled set-up " and positioning as well as therapeutic rest)    Patient Education and Home Exercises       Education provided:     - Patient provided with verbal and demonstrative instruction for all activities performed in today's session.    Written Home Exercises Provided:  Patient instructed to cont prior HEP.      See Electronic Medical Record under Patient Instructions for exercises provided during therapy sessions    Assessment     Vandana provided good participation and effort during today's session with treatment focused on lower extremity strengthening, endurance and functional mobility. Vandana tolerated activities with seated rest breaks, did well with balance activities today having no loss of balance.      Vandana Is progressing well towards her goals.   Patient prognosis is Good.     Patient will continue to benefit from skilled outpatient physical therapy to address the deficits listed in the problem list box on initial evaluation, provide pt/family education and to maximize pt's level of independence in the home and community environment.     Patient's spiritual, cultural and educational needs considered and pt agreeable to plan of care and goals.     Anticipated barriers to physical therapy: vertigo    Goals:     Short Term Goals: 4 weeks Date last assessed:  Status:    1. Patient will be provided with an HEP for strength, endurance and balance.  7/16/2024    MET   2.  Patient will demonstrate improve endurance and activity tolerance as evidenced by ability to perform Nu-step x 15 minutes without rests breaks. 8/1/24  MET   3. Pt will improve 5x sit to stand score from 24/3 sec to less than 18 seconds to decrease fall risk.  7/16/2024    Ongoing        Long Term Goals: 8 weeks  Date last assessed: Status:    1. Patient will be independent and compliant with updated HEP. 7/16/2024    progressing   2.  Patient will increase her   gait speed as assessed by the timed 10MWT from 0.61 m/s to 0.75 m/s to increase her safety  and (I) with gait at a community level. (MDC for CVA= 0.14 m/s)     7/16/2024    Progressing  (8/22/24 0.76 meters per second)      3. The patient will demonstrate improved efficiency with functional mobility and decreased risk for falls as evidenced by an increase in her score on the Timed up and Go from 19.7 sec to 16.8 sec. (Minimal detectable change in chronic stroke = 2.9 seconds)  7/16/2024 Progressing  (8/22/24 - 14.6 seconds)   4.Patient will improve her FOTO score from 59  to at least 72 for improved self perception of functional mobility.(score 0-100, high score indicates greater level of function) 7/16/2024 ongoing  (8/22/24 - Score 46)   5.  Patient will demonstrated improved score on the Carreno Balance Scale from 32/56 to 39/56 to demonstrate improved functional mobility, balance and decreased risk for falls. (MDC for acute CVA= 7 points; MDC for chronic CVA =5 points)    7/16/2024    ongoing         Plan     Plan of care Certification: 7/16/2024 to 9/10/24.     Outpatient Physical Therapy 2 times weekly for 8 weeks to include the following interventions: Gait Training, Moist Heat/ Ice, Neuromuscular Re-ed, Orthotic Management and Training, Patient Education, Therapeutic Activities, and Therapeutic Exercise.     Julieta Lamb, Physical Therapist Assistant

## 2024-08-29 ENCOUNTER — CLINICAL SUPPORT (OUTPATIENT)
Dept: REHABILITATION | Facility: HOSPITAL | Age: 54
End: 2024-08-29
Payer: MEDICAID

## 2024-08-29 DIAGNOSIS — Z74.09 IMPAIRED FUNCTIONAL MOBILITY, BALANCE, GAIT, AND ENDURANCE: Primary | ICD-10-CM

## 2024-08-29 PROCEDURE — 97110 THERAPEUTIC EXERCISES: CPT | Mod: PO,CQ

## 2024-08-29 NOTE — PROGRESS NOTES
"[facility]   Physical Therapy Treatment Note      Name: Meg Lockhart  Clinic Number: [id number]    Therapy Diagnosis:   Encounter Diagnosis   Name Primary?    Impaired functional mobility, balance, gait, and endurance Yes       Physician: Mitra Mariee FNP    Visit Date: 8/29/2024    Physician Orders: PT Eval and Treat   Medical Diagnosis from Referral:   Diagnosis   I63.9 (ICD-10-CM) - Cerebellar stroke, acute      Evaluation Date: 7/16/2024  Authorization Period Expiration: 12/31/24  Plan of Care Expiration: 9/10/24  Progress Note Due: 8/16/24  Visit # / Visits authorized: 8/20(9)  FOTO: 1/5     Precautions: standard fall   diabetes     Time In: 0931  Time Out: 1013  Total Billable Time: 42 minutes    PTA Visit #: 4/5     Subjective     Patient reports: "I went back to work", reports decreased back pain today.  Patient reports was compliant with home exercise program   Response to previous treatment: no problems stated  Functional change: ongoing    Pain: 3/10   Location: low back    Objective      Objective Measures updated at progress report unless specified.     Treatment     Vandana received the treatments listed below:      therapeutic exercises to develop strength, endurance, range of motion  for 13 minutes including:    Recumbent bike Level 4.0-3.5 13 minutes     (Activity time includes skilled set-up and posi    therapeutic activities to improve functional performance for 29 minutes, including:    Ambulation without assistive device, verbal cues to decrease base of support     Parallel bars:  Weight shift on foam cushion 2 minutes: anterior/posterior, Right/Left   Tandem weight shift on blue foam cushion  3x 1 minute Right Left leading    Sit<>stand 5 times: 26 seconds, 25 seconds, 23 seconds (with verbal cues to increase speed)    (Activity time includes skilled set-up and positioning as well as therapeutic rest)    Patient Education and Home Exercises       Education " provided:     - Patient provided with verbal and demonstrative instruction for all activities performed in today's session.    Written Home Exercises Provided:  Patient instructed to cont prior HEP.      See Electronic Medical Record under Patient Instructions for exercises provided during therapy sessions    Assessment     Vandana provided good participation and effort during today's session with treatment focused on lower extremity strengthening, endurance and functional mobility. Vandana tolerated activities without complaints, performed sit<>stand with cues to increase speed with poor follow through, continues to pause in stand for approximately 2-3 seconds.    Vandana Is progressing well towards her goals.   Patient prognosis is Good.     Patient will continue to benefit from skilled outpatient physical therapy to address the deficits listed in the problem list box on initial evaluation, provide pt/family education and to maximize pt's level of independence in the home and community environment.     Patient's spiritual, cultural and educational needs considered and pt agreeable to plan of care and goals.     Anticipated barriers to physical therapy: vertigo    Goals:     Short Term Goals: 4 weeks Date last assessed:  Status:    1. Patient will be provided with an HEP for strength, endurance and balance.  7/16/2024    MET   2.  Patient will demonstrate improve endurance and activity tolerance as evidenced by ability to perform Nu-step x 15 minutes without rests breaks. 8/1/24  MET   3. Pt will improve 5x sit to stand score from 24/3 sec to less than 18 seconds to decrease fall risk.  7/16/2024    Ongoing        Long Term Goals: 8 weeks  Date last assessed: Status:    1. Patient will be independent and compliant with updated HEP. 7/16/2024    progressing   2.  Patient will increase her   gait speed as assessed by the timed 10MWT from 0.61 m/s to 0.75 m/s to increase her safety and (I) with gait at a community level.  (MDC for CVA= 0.14 m/s)     7/16/2024    Progressing  (8/22/24 0.76 meters per second)      3. The patient will demonstrate improved efficiency with functional mobility and decreased risk for falls as evidenced by an increase in her score on the Timed up and Go from 19.7 sec to 16.8 sec. (Minimal detectable change in chronic stroke = 2.9 seconds)  7/16/2024 Progressing  (8/22/24 - 14.6 seconds)   4.Patient will improve her FOTO score from 59  to at least 72 for improved self perception of functional mobility.(score 0-100, high score indicates greater level of function) 7/16/2024 ongoing  (8/22/24 - Score 46)   5.  Patient will demonstrated improved score on the Carreno Balance Scale from 32/56 to 39/56 to demonstrate improved functional mobility, balance and decreased risk for falls. (MDC for acute CVA= 7 points; MDC for chronic CVA =5 points)    7/16/2024    ongoing         Plan     Plan of care Certification: 7/16/2024 to 9/10/24.     Outpatient Physical Therapy 2 times weekly for 8 weeks to include the following interventions: Gait Training, Moist Heat/ Ice, Neuromuscular Re-ed, Orthotic Management and Training, Patient Education, Therapeutic Activities, and Therapeutic Exercise.     Julieta Lamb, Physical Therapist Assistant

## 2024-09-03 NOTE — PROGRESS NOTES
"[facility]   Physical Therapy Treatment Note      Name: Mge Ross Lowai  Clinic Number: [id number]    Therapy Diagnosis:   Encounter Diagnosis   Name Primary?    Impaired functional mobility, balance, gait, and endurance Yes         Physician: Mitra Mariee FNP    Visit Date: 9/5/2024    Physician Orders: PT Eval and Treat   Medical Diagnosis from Referral:   Diagnosis   I63.9 (ICD-10-CM) - Cerebellar stroke, acute      Evaluation Date: 7/16/2024  Authorization Period Expiration: 12/31/24  Plan of Care Expiration: 9/10/24  Progress Note Due: 8/16/24  Visit # / Visits authorized: 8/20(9)  FOTO: 1/5     Precautions: standard fall   diabetes     Time In: 9:34 am    Time Out: 10:15 am    Total Billable Time: 41 minutes    PTA Visit #: 0/5     Subjective     Patient reports: "I had a fall on Tuesday". Pt reports she lost her balance and fell when she got slightly dizzy." Pt reports she is taking pain medication for back pain. Reports she went back to work 3 days a week last week.     Patient reports was compliant with home exercise program   Response to previous treatment: no problems stated  Functional change: ongoing    Pain: 7/10    Location: low back    Objective      Objective Measures updated at progress report unless specified.     Treatment     Vandana received the treatments listed below:      therapeutic exercises to develop strength, endurance, range of motion  for 15  minutes including:     Sci bike Level 3 15 minutes bilateral upper extremity/ lower extremity     (Activity time includes skilled set-up and position    therapeutic activities to improve functional performance for 26  minutes, including:     eyes exercises: in sitting  - Saccades horizontal and vertical x 30 sec each way with 2 targets on popsicle sticks  - smooth pursuits with 1 target popsicle stick vertical and horizontal   - VOR 1 30 sec  x 2 trials vertical and horizontal.       (Activity time includes " skilled set-up and positioning as well as therapeutic rest)    Patient Education and Home Exercises       Education provided:     - educated about importance of attending therapy to make improvement. Reports understanding.     Written Home Exercises Provided:  Patient instructed to cont prior HEP.      See Electronic Medical Record under Patient Instructions for exercises provided during therapy sessions    Assessment       Pt tolerated session well. Working on vestibular eye exercises in sitting today. Went over saccades, VOR 1, and smooth pursuits. Pt with rest breaks as needed with dizziness. Pt with a lot of cancels and no shows. Pt reports she started a new job recently which may be contributing to decreased attendance. Educated about attendance policy and how attending therapy will assist her progress. Reports understanding.       Vandana Is progressing well towards her goals.   Patient prognosis is Good.     Patient will continue to benefit from skilled outpatient physical therapy to address the deficits listed in the problem list box on initial evaluation, provide pt/family education and to maximize pt's level of independence in the home and community environment.     Patient's spiritual, cultural and educational needs considered and pt agreeable to plan of care and goals.     Anticipated barriers to physical therapy: vertigo    Goals:     Short Term Goals: 4 weeks Date last assessed:  Status:    1. Patient will be provided with an HEP for strength, endurance and balance.  7/16/2024    MET   2.  Patient will demonstrate improve endurance and activity tolerance as evidenced by ability to perform Nu-step x 15 minutes without rests breaks. 8/1/24  MET   3. Pt will improve 5x sit to stand score from 24/3 sec to less than 18 seconds to decrease fall risk.  7/16/2024    Ongoing        Long Term Goals: 8 weeks  Date last assessed: Status:    1. Patient will be independent and compliant with updated HEP. 7/16/2024     progressing   2.  Patient will increase her   gait speed as assessed by the timed 10MWT from 0.61 m/s to 0.75 m/s to increase her safety and (I) with gait at a community level. (MDC for CVA= 0.14 m/s)     7/16/2024    Progressing  (8/22/24 0.76 meters per second)      3. The patient will demonstrate improved efficiency with functional mobility and decreased risk for falls as evidenced by an increase in her score on the Timed up and Go from 19.7 sec to 16.8 sec. (Minimal detectable change in chronic stroke = 2.9 seconds)  7/16/2024 Progressing  (8/22/24 - 14.6 seconds)   4.Patient will improve her FOTO score from 59  to at least 72 for improved self perception of functional mobility.(score 0-100, high score indicates greater level of function) 7/16/2024 ongoing  (8/22/24 - Score 46)   5.  Patient will demonstrated improved score on the Carreno Balance Scale from 32/56 to 39/56 to demonstrate improved functional mobility, balance and decreased risk for falls. (MDC for acute CVA= 7 points; MDC for chronic CVA =5 points)    7/16/2024    ongoing         Plan     Plan of care Certification: 7/16/2024 to 9/10/24.     Outpatient Physical Therapy 2 times weekly for 8 weeks to include the following interventions: Gait Training, Moist Heat/ Ice, Neuromuscular Re-ed, Orthotic Management and Training, Patient Education, Therapeutic Activities, and Therapeutic Exercise.     Maddy Childs, PT

## 2024-09-05 ENCOUNTER — CLINICAL SUPPORT (OUTPATIENT)
Dept: REHABILITATION | Facility: HOSPITAL | Age: 54
End: 2024-09-05
Payer: MEDICAID

## 2024-09-05 DIAGNOSIS — Z74.09 IMPAIRED FUNCTIONAL MOBILITY, BALANCE, GAIT, AND ENDURANCE: Primary | ICD-10-CM

## 2024-09-05 PROCEDURE — 97110 THERAPEUTIC EXERCISES: CPT | Mod: PO

## 2024-09-10 ENCOUNTER — CLINICAL SUPPORT (OUTPATIENT)
Dept: REHABILITATION | Facility: HOSPITAL | Age: 54
End: 2024-09-10
Payer: MEDICAID

## 2024-09-10 DIAGNOSIS — Z74.09 IMPAIRED FUNCTIONAL MOBILITY, BALANCE, GAIT, AND ENDURANCE: Primary | ICD-10-CM

## 2024-09-10 PROCEDURE — 97110 THERAPEUTIC EXERCISES: CPT | Mod: PO

## 2024-09-10 NOTE — PLAN OF CARE
"[facility]   Physical Therapy Plan of Care Update       Name: Meg Lockhart  Clinic Number: [id number]    Therapy Diagnosis:   Encounter Diagnosis   Name Primary?    Impaired functional mobility, balance, gait, and endurance Yes       Physician: Mitra Mariee FNP    Visit Date: 9/10/2024    Physician Orders: PT Eval and Treat   Medical Diagnosis from Referral:   Diagnosis   I63.9 (ICD-10-CM) - Cerebellar stroke, acute      Evaluation Date: 7/16/2024  Authorization Period Expiration: 12/31/24  Plan of Care Expiration: 11/5/24  Progress Note Due: 8/16/24  Visit # / Visits authorized: 8/20(9)  FOTO: 1/5     Precautions: standard fall   diabetes     Time In: 9:34 am ( late arrival to PT)   Time Out: 10:15 am    Total Billable Time: 41 minutes    PTA Visit #: 0/5     Subjective     Patient reports: " my sciatic nerve is acting up again."    Patient reports was compliant with home exercise program   Response to previous treatment: no problems stated  Functional change: ongoing    Pain: 9/10     Location: sciatica    Objective      Objective Measures updated at progress report unless specified.     Treatment     Vandana received the treatments listed below:      therapeutic exercises to develop strength, endurance, range of motion  for 15   minutes including:     Sci bike Level 1 15 minutes bilateral upper extremity/ lower extremity     (Activity time includes skilled set-up and position    therapeutic activities to improve functional performance for 26 minutes, including:         Evaluation 9/10/24   Single Limb Stance right  lower extremity  Not assessed at this time 15 sec   Single Limb Stance Left  lower extremity  Not assessed at this time.   (<10 sec = HIGH FALL RISK) 4 sec   5 times sit to stand 24.3 seconds no upper extremity support 22.3 sec no upper extremity support    TUG 19.7  seconds no assistive device  12.6 sec no assistive device    Self selected walking speed (10MWT) " 0.61 m/sec (6m/9.86s)  no assistive device  0.92 m/s (6m/ 6.5 sec) no assistive device    Jaeger Balance Scale 32/56 no assistive device  42/56        JAEGER Balance Assessment    1. Sitting to Standing   3 - able to stand independely using hands  2. Standing Unsupported   4 - able to stand safely 2 minutes without hold  3. Sitting Unsupported   4 - able to sit safely and securely 2 minutes  4. Standing to Sitting   3 - controls descent by using hands  5. Pivot Transfer   4 - able to trnasfer safely with minor use of hands  6. Standing with Eyes Closed   3 - able to stand 10 seconds with supervision  7. Standing with Feet Together   3 - able to place feet together independently and stand for 1 minute with supervision  8. Reaching Forward with Outstretched Arm   2 - can reach forward 5 cm/2 inches safely  9. Retrieving Object from Floor   3 - able to pick slipper but needs supervision  10. Turning to Look Behind   2 - turns sideways only but maintains balance  11. Turning 360 Degrees   2 - able to turn 360 safely but slowly  12. Placing Alternate Foot on Step   3 - able to stand independently and completely 8 steps > 20 seconds  13. Standing with One Foot in Front   3 - able to place foot ahead of other independently and hold 30 seconds  14. Standing on One Foot   3- able to lift leg independently and hold 5-10 seconds  Total: 42  Maximum: 56      Patient scored a 42/56 on the Jaeger Balance Assessment. This is below the cut-off score indicating increased risk for falls (45/56).      (Activity time includes skilled set-up and positioning as well as therapeutic rest)    Patient Education and Home Exercises       Education provided:     - educated about progress with therapy. Reports understanding.     Written Home Exercises Provided:  Patient instructed to cont prior HEP.      See Electronic Medical Record under Patient Instructions for exercises provided during therapy sessions    Assessment      Pt tolerated session well.  Plan of Care Update today to reassess progress towards goals. Pt has met 2/3 short term goals and 3/5 long term goals. Pt has progressed in walking speed, TUG score, Carreno balance and 5x sit to stand ( even with increase sciatica pain today) pt would benefit from PT to continue to work on balance, walking speed endurance to decrease fall risk. Pt remains motivated to improve and a good candidate for PT.         Vandana Is progressing well towards her goals.   Patient prognosis is Good.     Patient will continue to benefit from skilled outpatient physical therapy to address the deficits listed in the problem list box on initial evaluation, provide pt/family education and to maximize pt's level of independence in the home and community environment.     Patient's spiritual, cultural and educational needs considered and pt agreeable to plan of care and goals.     Anticipated barriers to physical therapy: vertigo    Goals:       Short Term Goals: 4 weeks Date last assessed:  Status:    1. Patient will be provided with an HEP for strength, endurance and balance.  7/16/2024    MET   2.  Patient will demonstrate improve endurance and activity tolerance as evidenced by ability to perform Nu-step x 15 minutes without rests breaks. 8/1/24  MET   3. Pt will improve 5x sit to stand score from 24/3 sec to less than 18 seconds to decrease fall risk.  9/10/24    Ongoing        Long Term Goals: 8 weeks  Date last assessed: Status:    1. Patient will be independent and compliant with updated HEP. 9/10/24    progressing   2.  Patient will increase her   gait speed as assessed by the timed 10MWT from 0.92 m/s to  m/s to increase her safety and (I) with gait at a community level. (MDC for CVA= 0.14 m/s)     9/10/24    MET   3. The patient will demonstrate improved efficiency with functional mobility and decreased risk for falls as evidenced by an increase in her score on the Timed up and Go from 19.7 sec to 16.8 sec. (Minimal detectable  change in chronic stroke = 2.9 seconds)  9/10/24 MET   4.Patient will improve her FOTO score from 59  to at least 72 for improved self perception of functional mobility.(score 0-100, high score indicates greater level of function) 9/10/24 ongoing  (8/22/24 - Score 46)   5.  Patient will demonstrated improved score on the Carreno Balance Scale from 32/56 to 39/56 to demonstrate improved functional mobility, balance and decreased risk for falls. (MDC for acute CVA= 7 points; MDC for chronic CVA =5 points)    9/10/24    MET   6. Patient will increase her   gait speed as assessed by the timed 10MWT from 0.61 m/s to 0.75 m/s to increase her safety and (I) with gait at a community level. (MDC for CVA= 0.14 m/s) 9/10/24 NEW   7. The patient will demonstrate improved efficiency with functional mobility and decreased risk for falls as evidenced by an increase in her score on the Timed up and Go from 12.6 sec to 9.7 sec. (Minimal detectable change in chronic stroke = 2.9 seconds)  9/10/24 NEW   8. Patient will demonstrated improved score on the Carreno Balance Scale from 42/56 to 49/56 to demonstrate improved functional mobility, balance and decreased risk for falls. (MDC for acute CVA= 7 points; MDC for chronic CVA =5 points) 9/10/24 NEW         Plan     Updated Plan of care Certification: 9/10/24- 11/5/24.      Outpatient Physical Therapy 2 times weekly for 8 weeks to include the following interventions: Gait Training, Moist Heat/ Ice, Neuromuscular Re-ed, Orthotic Management and Training, Patient Education, Therapeutic Activities, and Therapeutic Exercise.     Maddy Childs, PT

## 2024-09-23 PROBLEM — I63.9 ACUTE ISCHEMIC STROKE: Status: RESOLVED | Noted: 2024-06-18 | Resolved: 2024-09-23

## 2024-09-25 DIAGNOSIS — E11.9 TYPE 2 DIABETES MELLITUS WITHOUT COMPLICATION: ICD-10-CM

## 2024-10-01 ENCOUNTER — CLINICAL SUPPORT (OUTPATIENT)
Dept: REHABILITATION | Facility: HOSPITAL | Age: 54
End: 2024-10-01
Payer: MEDICAID

## 2024-10-01 ENCOUNTER — DOCUMENTATION ONLY (OUTPATIENT)
Dept: REHABILITATION | Facility: HOSPITAL | Age: 54
End: 2024-10-01

## 2024-10-01 DIAGNOSIS — Z74.09 IMPAIRED FUNCTIONAL MOBILITY, BALANCE, GAIT, AND ENDURANCE: Primary | ICD-10-CM

## 2024-10-01 PROCEDURE — 97110 THERAPEUTIC EXERCISES: CPT | Mod: PO,CQ

## 2024-10-01 NOTE — PROGRESS NOTES
"[facility]   Physical Therapy Treatment Note      Name: Meg Ross Lowai  Clinic Number: [id number]    Therapy Diagnosis:   Encounter Diagnosis   Name Primary?    Impaired functional mobility, balance, gait, and endurance Yes     Physician: Mitra Mariee FNP    Visit Date: 10/1/2024    Physician Orders: PT Eval and Treat   Medical Diagnosis from Referral:   Diagnosis   I63.9 (ICD-10-CM) - Cerebellar stroke, acute      Evaluation Date: 7/16/2024  Authorization Period Expiration: 12/31/24  Plan of Care Expiration: 11/5/24  Progress Note Due: 8/16/24  Visit # / Visits authorized: 8/20(9)  FOTO: 1/5     Precautions: standard fall   diabetes     Time In: 0845  Time Out: 0925    Total Billable Time: 40 minutes    PTA Visit #: 1/5       Subjective     Patient reports: Bilateral lower extremity pain with decreased sensation in Bilateral feet, "feels like I'm stepping on something, like a mat". Stated went on a cruise last week and had no difficulties. Stating "back not a problem".  She reports was compliant with home exercise program.  Response to previous treatment: no problems stated  Functional change: ongoing    Pain: 6/10  Location: bilateral lower extremities     Objective      Objective Measures updated at progress report unless specified.     Treatment     Vandana received the treatments listed below:      therapeutic exercises to develop strength, endurance, ROM, flexibility, and core stabilization for 17 minutes including:    NuStep Level 3 17 minutes with Bilateral Upper Extremities     therapeutic activities to improve functional performance for 23  minutes, including:    Stand beside mat:  Weight shift on foam cushion 2 minutes: anterior/posterior, Right/Left   Tandem weight shift on foam cushion 3x 1 minute Right Left alternating    Parallel bars:  Tandem gait forward/backward 4 minutes with light upper extremity support    (Activity time includes skilled set-up and positioning " "as well as therapeutic rest)    Patient Education and Home Exercises       Education provided:   - Patient provided with verbal and demonstrative instruction for all activities performed in today's session.    Written Home Exercises Provided: Pt instructed to continue prior HEP.     See Electronic Medical Record under Patient Instructions for exercises provided during therapy sessions    Assessment     Meg provided good participation and effort during today's session with treatment focused on lower extremity strengthening/endurance and functional mobility. Meg tolerated activities without report of dizziness after tandem gait in parallel bars, reported "gone" after brief sit rest.      Vandana Is progressing towards her goals.   Patient prognosis is Fair.     Patient will continue to benefit from skilled outpatient physical therapy to address the deficits listed in the problem list box on initial evaluation, provide pt/family education and to maximize pt's level of independence in the home and community environment.     Patient's spiritual, cultural and educational needs considered and pt agreeable to plan of care and goals.      Anticipated barriers to physical therapy: vertigo    Goals:   Short Term Goals: 4 weeks Date last assessed:  Status:    1. Patient will be provided with an HEP for strength, endurance and balance.  7/16/2024    MET   2.  Patient will demonstrate improve endurance and activity tolerance as evidenced by ability to perform Nu-step x 15 minutes without rests breaks. 8/1/24  MET   3. Pt will improve 5x sit to stand score from 24/3 sec to less than 18 seconds to decrease fall risk.  9/10/24    Ongoing         Long Term Goals: 8 weeks  Date last assessed: Status:    1. Patient will be independent and compliant with updated HEP. 9/10/24    progressing   2.  Patient will increase her   gait speed as assessed by the timed 10MWT from 0.92 m/s to  m/s to increase her safety and (I) with gait at " a community level. (MDC for CVA= 0.14 m/s)     9/10/24    MET   3. The patient will demonstrate improved efficiency with functional mobility and decreased risk for falls as evidenced by an increase in her score on the Timed up and Go from 19.7 sec to 16.8 sec. (Minimal detectable change in chronic stroke = 2.9 seconds)  9/10/24 MET   4.Patient will improve her FOTO score from 59  to at least 72 for improved self perception of functional mobility.(score 0-100, high score indicates greater level of function) 9/10/24 ongoing  (8/22/24 - Score 46)   5.  Patient will demonstrated improved score on the Carreno Balance Scale from 32/56 to 39/56 to demonstrate improved functional mobility, balance and decreased risk for falls. (MDC for acute CVA= 7 points; MDC for chronic CVA =5 points)    9/10/24    MET   6. Patient will increase her   gait speed as assessed by the timed 10MWT from 0.61 m/s to 0.75 m/s to increase her safety and (I) with gait at a community level. (MDC for CVA= 0.14 m/s) 9/10/24 ongoing   7. The patient will demonstrate improved efficiency with functional mobility and decreased risk for falls as evidenced by an increase in her score on the Timed up and Go from 12.6 sec to 9.7 sec. (Minimal detectable change in chronic stroke = 2.9 seconds)  9/10/24 ongoing   8. Patient will demonstrated improved score on the Carreno Balance Scale from 42/56 to 49/56 to demonstrate improved functional mobility, balance and decreased risk for falls. (MDC for acute CVA= 7 points; MDC for chronic CVA =5 points) 9/10/24 ongoing           Plan     Updated Plan of care Certification: 9/10/24- 11/5/24.      Outpatient Physical Therapy 2 times weekly for 8 weeks to include the following interventions: Gait Training, Moist Heat/ Ice, Neuromuscular Re-ed, Orthotic Management and Training, Patient Education, Therapeutic Activities, and Therapeutic Exercise.     Julieta Lamb, PTA

## 2024-10-03 ENCOUNTER — PATIENT OUTREACH (OUTPATIENT)
Dept: ADMINISTRATIVE | Facility: HOSPITAL | Age: 54
End: 2024-10-03
Payer: MEDICAID

## 2024-10-03 ENCOUNTER — TELEPHONE (OUTPATIENT)
Dept: FAMILY MEDICINE | Facility: CLINIC | Age: 54
End: 2024-10-03
Payer: MEDICAID

## 2024-10-03 ENCOUNTER — CLINICAL SUPPORT (OUTPATIENT)
Dept: REHABILITATION | Facility: HOSPITAL | Age: 54
End: 2024-10-03
Payer: MEDICAID

## 2024-10-03 DIAGNOSIS — Z74.09 IMPAIRED FUNCTIONAL MOBILITY, BALANCE, GAIT, AND ENDURANCE: Primary | ICD-10-CM

## 2024-10-03 PROCEDURE — 97110 THERAPEUTIC EXERCISES: CPT | Mod: PO,CQ

## 2024-10-03 NOTE — PROGRESS NOTES
"[facility]   Physical Therapy Treatment Note      Name: Meg Ross Lowai  Clinic Number: [id number]    Therapy Diagnosis:   Encounter Diagnosis   Name Primary?    Impaired functional mobility, balance, gait, and endurance Yes     Physician: Mitra Mariee FNP    Visit Date: 10/3/2024    Physician Orders: PT Eval and Treat   Medical Diagnosis from Referral:   Diagnosis   I63.9 (ICD-10-CM) - Cerebellar stroke, acute      Evaluation Date: 7/16/2024  Authorization Period Expiration: 12/31/24  Plan of Care Expiration: 11/5/24  Progress Note Due: 8/16/24  Visit # / Visits authorized: 12/16(13)  FOTO: 1/5     Precautions: standard fall   diabetes     Time In: 0845  Time Out: 0930  Total Billable Time: 45 minutes    PTA Visit #: 2/5       Subjective     Patient reports: increased Bilateral foot pain with decreased sensation and "going to make a doctor appointment to see what is going on".  She reports was compliant with home exercise program.  Response to previous treatment: no problems stated  Functional change: ongoing    Pain: 6/10  Location: bilateral lower extremities     Objective      Objective Measures updated at progress report unless specified.     Treatment     Vandana received the treatments listed below:      therapeutic exercises to develop strength, endurance, range of motion and flexibility  for 20 minutes including:    Recumbent bike Level 2.2 (self selected) 10 minutes     Seated:  Plantarflexor stretch 3x30 seconds Right Left, foot on stool with towel    therapeutic activities to improve functional performance for 25  minutes, including:    Parallel bars:  Tandem gait forward 2 minutes with light upper extremity support, verbal cues to increase step length  Tandem gait forward/backward 2 minutes with light upper extremity support    Weight shift on foam cushion 2 minutes: anterior/posterior, Right/Left   Tandem weight shift on foam cushion 3x 1 minute Right Left " alternating    (Activity time includes skilled set-up and positioning as well as therapeutic rest)    Patient Education and Home Exercises       Education provided:   - Patient provided with verbal and demonstrative instruction for all activities performed in today's session.    Written Home Exercises Provided: Pt instructed to continue prior HEP.     See Electronic Medical Record under Patient Instructions for exercises provided during therapy sessions    Assessment     Meg provided good participation and effort during today's session with treatment focused on lower extremity strengthening/endurance and functional mobility. Meg with poor tolerance to recumbent bike but able to tolerate stand activities without complaints of increased pain.      Vandana Is progressing towards her goals.   Patient prognosis is Fair.     Patient will continue to benefit from skilled outpatient physical therapy to address the deficits listed in the problem list box on initial evaluation, provide pt/family education and to maximize pt's level of independence in the home and community environment.     Patient's spiritual, cultural and educational needs considered and pt agreeable to plan of care and goals.      Anticipated barriers to physical therapy: vertigo    Goals:   Short Term Goals: 4 weeks Date last assessed:  Status:    1. Patient will be provided with an HEP for strength, endurance and balance.  7/16/2024    MET   2.  Patient will demonstrate improve endurance and activity tolerance as evidenced by ability to perform Nu-step x 15 minutes without rests breaks. 8/1/24  MET   3. Pt will improve 5x sit to stand score from 24/3 sec to less than 18 seconds to decrease fall risk.  9/10/24    Ongoing         Long Term Goals: 8 weeks  Date last assessed: Status:    1. Patient will be independent and compliant with updated HEP. 9/10/24    progressing   2.  Patient will increase her   gait speed as assessed by the timed 10MWT from  0.92 m/s to  m/s to increase her safety and (I) with gait at a community level. (MDC for CVA= 0.14 m/s)     9/10/24    MET   3. The patient will demonstrate improved efficiency with functional mobility and decreased risk for falls as evidenced by an increase in her score on the Timed up and Go from 19.7 sec to 16.8 sec. (Minimal detectable change in chronic stroke = 2.9 seconds)  9/10/24 MET   4.Patient will improve her FOTO score from 59  to at least 72 for improved self perception of functional mobility.(score 0-100, high score indicates greater level of function) 9/10/24 ongoing  (8/22/24 - Score 46)   5.  Patient will demonstrated improved score on the Carreno Balance Scale from 32/56 to 39/56 to demonstrate improved functional mobility, balance and decreased risk for falls. (MDC for acute CVA= 7 points; MDC for chronic CVA =5 points)    9/10/24    MET   6. Patient will increase her   gait speed as assessed by the timed 10MWT from 0.61 m/s to 0.75 m/s to increase her safety and (I) with gait at a community level. (MDC for CVA= 0.14 m/s) 9/10/24 ongoing   7. The patient will demonstrate improved efficiency with functional mobility and decreased risk for falls as evidenced by an increase in her score on the Timed up and Go from 12.6 sec to 9.7 sec. (Minimal detectable change in chronic stroke = 2.9 seconds)  9/10/24 ongoing   8. Patient will demonstrated improved score on the Carreno Balance Scale from 42/56 to 49/56 to demonstrate improved functional mobility, balance and decreased risk for falls. (MDC for acute CVA= 7 points; MDC for chronic CVA =5 points) 9/10/24 ongoing           Plan     Updated Plan of care Certification: 9/10/24- 11/5/24.      Outpatient Physical Therapy 2 times weekly for 8 weeks to include the following interventions: Gait Training, Moist Heat/ Ice, Neuromuscular Re-ed, Orthotic Management and Training, Patient Education, Therapeutic Activities, and Therapeutic Exercise.     Julieta Lamb,  PTA

## 2024-10-03 NOTE — TELEPHONE ENCOUNTER
----- Message from Susanaerik sent at 10/3/2024  1:02 PM CDT -----  Regarding: appt  Type:  Sooner Apoointment Request      Name of Caller:pt    When is the first available appointment?dept booked     Symptoms:foot pain/ leg swollen/ ankle burning       Best Call Back Number:[phone]      Additional Information: pt wants to get schedule.  please call to discuss.

## 2024-10-03 NOTE — PROGRESS NOTES
Population Health Chart Review & Patient Outreach Details      Additional [facility] Notes:               Updates Requested / Reviewed:      Updated Care Coordination Note         Health Maintenance Topics Overdue:      [facility] Score: 4     Urine Screening  Eye Exam  Hemoglobin A1c  Foot Exam                       Health Maintenance Topic(s) Outreach Outcomes & Actions Taken:    Eye Exam - Outreach Outcomes & Actions Taken  : msg    Lab(s) - Outreach Outcomes & Actions Taken  : Overdue Lab(s) Ordered

## 2024-10-18 DIAGNOSIS — D64.9 ANEMIA, UNSPECIFIED: Primary | ICD-10-CM

## 2024-10-22 NOTE — PROGRESS NOTES
"[facility]   Physical Therapy Treatment Note / progress note     Name: Meg Lockhart  Clinic Number: [id number]    Therapy Diagnosis:   Encounter Diagnosis   Name Primary?    Impaired functional mobility, balance, gait, and endurance Yes       Physician: Mitra Mariee FNP    Visit Date: 10/24/2024    Physician Orders: PT Eval and Treat   Medical Diagnosis from Referral:   Diagnosis   I63.9 (ICD-10-CM) - Cerebellar stroke, acute      Evaluation Date: 7/16/2024  Authorization Period Expiration: 12/31/24  Plan of Care Expiration: 11/5/24.  Progress Note Due: 8/16/24  Visit # / Visits authorized: 12/16(13)  FOTO: 1/5     Precautions: standard fall diabetes     Time In:  8:47 am   Time Out: 9:30 am    Total Billable Time: 43  minutes    PTA Visit #: 0/5       Subjective     Patient reports:  " I can't remember anything at my job."  Pt wants speech therapy for memory strategies.       She reports was compliant with home exercise program.  Response to previous treatment: no problems stated  Functional change: ongoing    Pain: 7/10   Location: right lower extremities     Objective      Objective Measures updated at progress report unless specified.     Treatment     Vandana received the treatments listed below:      therapeutic exercises to develop strength, endurance, range of motion and flexibility  for 10  minutes including:     Sci fit  Level 2.2 (self selected) 10 minutes  bilateral lower extremity      (Therapeutic rest breaks throughout as needed).       therapeutic activities to improve functional performance for 33   minutes, including:           Evaluation 9/10/24 10/24/24   Single Limb Stance right  lower extremity  Not assessed at this time 15 sec 6 sec    Single Limb Stance Left  lower extremity  Not assessed at this time.   (<10 sec = HIGH FALL RISK) 4 sec 5 sec   5 times sit to stand 24.3 seconds no upper extremity support 22.3 sec no upper extremity support  23.3 sec " "minimal upper extremity support.    TUG 19.7  seconds no assistive device  12.6 sec no assistive device  Not tested at this time.    Self selected walking speed (10MWT) 0.61 m/sec (6m/9.86s)  no assistive device  0.92 m/s (6m/ 6.5 sec) no assistive device  0.80 m/s (6m/ 7.52 sec ) no assistive device    Carreno Balance Scale 32/56 no assistive device  42/56 Not tested at this time          Parallel bars:  - Tandem gait forward  x 6 laps with occasional  light upper extremity support, verbal cues to increase step length  - standing on foam feet together 30 sec x 3 trials.  - standing feet partial tandem 30 sec x 3 trials each lower extremity   - alternating toe taps on 4 inch step 3x10   - lateral toe taps on 4 inch step 3x10     (Activity time includes skilled set-up and positioning as well as therapeutic rest)    Patient Education and Home Exercises       Education provided:     - educated about pack of progress with progress note assessment today. Educated about how PT cannot really " fix" neuropathy. Reports understanding.     Written Home Exercises Provided: Pt instructed to continue prior HEP.     See Electronic Medical Record under Patient Instructions for exercises provided during therapy sessions    Assessment     Pt tolerated session well. Progress note performed. Pt has not met any goals since last POC. Pt has only attended therapy twice since last POC. Pt reporting her most limiting factor recently has been her neuropathy in her feet which she has been seeing a doctor for. Pt wanting to try speech therapy for her memory deficits as it is affecting her work. Pt educated about POC update coming up and we have to be able to show progress to continue with therapy. Pt remains motivated to improve and a good candidate for PT.         Vandana Is progressing towards her goals.   Patient prognosis is Fair.     Patient will continue to benefit from skilled outpatient physical therapy to address the deficits listed in " the problem list box on initial evaluation, provide pt/family education and to maximize pt's level of independence in the home and community environment.     Patient's spiritual, cultural and educational needs considered and pt agreeable to plan of care and goals.      Anticipated barriers to physical therapy: vertigo    Goals:   Short Term Goals: 4 weeks Date last assessed:  Status:    1. Patient will be provided with an HEP for strength, endurance and balance.  7/16/2024    MET   2.  Patient will demonstrate improve endurance and activity tolerance as evidenced by ability to perform Nu-step x 15 minutes without rests breaks. 8/1/24  MET   3. Pt will improve 5x sit to stand score from 24/3 sec to less than 18 seconds to decrease fall risk.  9/10/24    Ongoing         Long Term Goals: 8 weeks  Date last assessed: Status:    1. Patient will be independent and compliant with updated HEP. 9/10/24    progressing   2.  Patient will increase her   gait speed as assessed by the timed 10MWT from 0.92 m/s to  m/s to increase her safety and (I) with gait at a community level. (MDC for CVA= 0.14 m/s)     9/10/24    MET   3. The patient will demonstrate improved efficiency with functional mobility and decreased risk for falls as evidenced by an increase in her score on the Timed up and Go from 19.7 sec to 16.8 sec. (Minimal detectable change in chronic stroke = 2.9 seconds)  9/10/24 MET   4.Patient will improve her FOTO score from 59  to at least 72 for improved self perception of functional mobility.(score 0-100, high score indicates greater level of function) 9/10/24 ongoing  (8/22/24 - Score 46)   5.  Patient will demonstrated improved score on the Carreno Balance Scale from 32/56 to 39/56 to demonstrate improved functional mobility, balance and decreased risk for falls. (MDC for acute CVA= 7 points; MDC for chronic CVA =5 points)    9/10/24    MET   6. Patient will increase her   gait speed as assessed by the timed 10MWT from  0.61 m/s to 0.75 m/s to increase her safety and (I) with gait at a community level. (MDC for CVA= 0.14 m/s) 9/10/24 ongoing   7. The patient will demonstrate improved efficiency with functional mobility and decreased risk for falls as evidenced by an increase in her score on the Timed up and Go from 12.6 sec to 9.7 sec. (Minimal detectable change in chronic stroke = 2.9 seconds)  9/10/24 ongoing   8. Patient will demonstrated improved score on the Carreno Balance Scale from 42/56 to 49/56 to demonstrate improved functional mobility, balance and decreased risk for falls. (MDC for acute CVA= 7 points; MDC for chronic CVA =5 points) 9/10/24 ongoing           Plan     Updated Plan of care Certification: 9/10/24- 11/5/24.      Outpatient Physical Therapy 2 times weekly for 8 weeks to include the following interventions: Gait Training, Moist Heat/ Ice, Neuromuscular Re-ed, Orthotic Management and Training, Patient Education, Therapeutic Activities, and Therapeutic Exercise.     Maddy Childs, PT

## 2024-10-24 ENCOUNTER — OFFICE VISIT (OUTPATIENT)
Dept: HEMATOLOGY/ONCOLOGY | Facility: CLINIC | Age: 54
End: 2024-10-24
Payer: MEDICAID

## 2024-10-24 ENCOUNTER — LAB VISIT (OUTPATIENT)
Dept: LAB | Facility: HOSPITAL | Age: 54
End: 2024-10-24
Attending: INTERNAL MEDICINE
Payer: MEDICAID

## 2024-10-24 ENCOUNTER — CLINICAL SUPPORT (OUTPATIENT)
Dept: REHABILITATION | Facility: HOSPITAL | Age: 54
End: 2024-10-24
Payer: MEDICAID

## 2024-10-24 VITALS
TEMPERATURE: 98 F | RESPIRATION RATE: 16 BRPM | SYSTOLIC BLOOD PRESSURE: 135 MMHG | HEIGHT: 65 IN | OXYGEN SATURATION: 99 % | HEART RATE: 87 BPM | WEIGHT: 191.13 LBS | DIASTOLIC BLOOD PRESSURE: 78 MMHG | BODY MASS INDEX: 31.85 KG/M2

## 2024-10-24 DIAGNOSIS — D64.9 ANEMIA, UNSPECIFIED: ICD-10-CM

## 2024-10-24 DIAGNOSIS — Z98.0: ICD-10-CM

## 2024-10-24 DIAGNOSIS — D50.9 IRON DEFICIENCY ANEMIA, UNSPECIFIED IRON DEFICIENCY ANEMIA TYPE: Primary | ICD-10-CM

## 2024-10-24 DIAGNOSIS — D50.9 IRON DEFICIENCY ANEMIA, UNSPECIFIED IRON DEFICIENCY ANEMIA TYPE: ICD-10-CM

## 2024-10-24 DIAGNOSIS — Z74.09 IMPAIRED FUNCTIONAL MOBILITY, BALANCE, GAIT, AND ENDURANCE: Primary | ICD-10-CM

## 2024-10-24 DIAGNOSIS — Z86.73 HISTORY OF ISCHEMIC STROKE: Primary | ICD-10-CM

## 2024-10-24 LAB
BASOPHILS # BLD AUTO: 0.03 K/UL (ref 0–0.2)
BASOPHILS NFR BLD: 0.5 % (ref 0–1.9)
DIFFERENTIAL METHOD BLD: ABNORMAL
EOSINOPHIL # BLD AUTO: 0.1 K/UL (ref 0–0.5)
EOSINOPHIL NFR BLD: 2.1 % (ref 0–8)
ERYTHROCYTE [DISTWIDTH] IN BLOOD BY AUTOMATED COUNT: 13 % (ref 11.5–14.5)
FERRITIN SERPL-MCNC: 63 NG/ML (ref 20–300)
HCT VFR BLD AUTO: 33 % (ref 37–48.5)
HGB BLD-MCNC: 10.5 G/DL (ref 12–16)
IMM GRANULOCYTES # BLD AUTO: 0.02 K/UL (ref 0–0.04)
IMM GRANULOCYTES NFR BLD AUTO: 0.3 % (ref 0–0.5)
IRON SERPL-MCNC: 64 UG/DL (ref 30–160)
LYMPHOCYTES # BLD AUTO: 2.5 K/UL (ref 1–4.8)
LYMPHOCYTES NFR BLD: 39.7 % (ref 18–48)
MCH RBC QN AUTO: 28.7 PG (ref 27–31)
MCHC RBC AUTO-ENTMCNC: 31.8 G/DL (ref 32–36)
MCV RBC AUTO: 90 FL (ref 82–98)
MONOCYTES # BLD AUTO: 0.5 K/UL (ref 0.3–1)
MONOCYTES NFR BLD: 7.4 % (ref 4–15)
NEUTROPHILS # BLD AUTO: 3.2 K/UL (ref 1.8–7.7)
NEUTROPHILS NFR BLD: 50 % (ref 38–73)
NRBC BLD-RTO: 0 /100 WBC
PLATELET # BLD AUTO: 319 K/UL (ref 150–450)
PMV BLD AUTO: 10 FL (ref 9.2–12.9)
RBC # BLD AUTO: 3.66 M/UL (ref 4–5.4)
SATURATED IRON: 20 % (ref 20–50)
TOTAL IRON BINDING CAPACITY: 322 UG/DL (ref 250–450)
TRANSFERRIN SERPL-MCNC: 230 MG/DL (ref 200–375)
VIT B12 SERPL-MCNC: 464 PG/ML (ref 210–950)
WBC # BLD AUTO: 6.33 K/UL (ref 3.9–12.7)

## 2024-10-24 PROCEDURE — 82728 ASSAY OF FERRITIN: CPT | Performed by: INTERNAL MEDICINE

## 2024-10-24 PROCEDURE — 99204 OFFICE O/P NEW MOD 45 MIN: CPT | Mod: S$PBB,,, | Performed by: INTERNAL MEDICINE

## 2024-10-24 PROCEDURE — 97110 THERAPEUTIC EXERCISES: CPT | Mod: PO

## 2024-10-24 PROCEDURE — 83540 ASSAY OF IRON: CPT | Performed by: INTERNAL MEDICINE

## 2024-10-24 PROCEDURE — 99999 PR PBB SHADOW E&M-EST. PATIENT-LVL IV: CPT | Mod: PBBFAC,,, | Performed by: INTERNAL MEDICINE

## 2024-10-24 PROCEDURE — 84466 ASSAY OF TRANSFERRIN: CPT | Performed by: INTERNAL MEDICINE

## 2024-10-24 PROCEDURE — 3075F SYST BP GE 130 - 139MM HG: CPT | Mod: CPTII,,, | Performed by: INTERNAL MEDICINE

## 2024-10-24 PROCEDURE — 1159F MED LIST DOCD IN RCRD: CPT | Mod: CPTII,,, | Performed by: INTERNAL MEDICINE

## 2024-10-24 PROCEDURE — 99214 OFFICE O/P EST MOD 30 MIN: CPT | Mod: PBBFAC,PN | Performed by: INTERNAL MEDICINE

## 2024-10-24 PROCEDURE — 85025 COMPLETE CBC W/AUTO DIFF WBC: CPT | Performed by: INTERNAL MEDICINE

## 2024-10-24 PROCEDURE — 3046F HEMOGLOBIN A1C LEVEL >9.0%: CPT | Mod: CPTII,,, | Performed by: INTERNAL MEDICINE

## 2024-10-24 PROCEDURE — 82607 VITAMIN B-12: CPT | Performed by: INTERNAL MEDICINE

## 2024-10-24 PROCEDURE — 3008F BODY MASS INDEX DOCD: CPT | Mod: CPTII,,, | Performed by: INTERNAL MEDICINE

## 2024-10-24 PROCEDURE — 36415 COLL VENOUS BLD VENIPUNCTURE: CPT | Performed by: INTERNAL MEDICINE

## 2024-10-24 PROCEDURE — 3078F DIAST BP <80 MM HG: CPT | Mod: CPTII,,, | Performed by: INTERNAL MEDICINE

## 2024-10-24 RX ORDER — SEMAGLUTIDE 0.68 MG/ML
INJECTION, SOLUTION SUBCUTANEOUS
COMMUNITY

## 2024-10-24 RX ORDER — FUROSEMIDE 20 MG/1
TABLET ORAL DAILY PRN
COMMUNITY

## 2024-10-24 NOTE — PROGRESS NOTES
HPI    54 years old female referred to Hematology Clinic for evaluation of IV iron therapy.  Patient has history of iron deficiency anemia.  Status post gastric bypass surgery for weight loss.  She is complaining of fatigue malaise.          Past Medical History:   Diagnosis Date    Anxiety     Diabetes mellitus, type 2     Elevated cholesterol     GERD (gastroesophageal reflux disease)     Hypertension     PUD (peptic ulcer disease)      Social History     Socioeconomic History    Marital status:    Tobacco Use    Smoking status: Never     Passive exposure: Never    Smokeless tobacco: Never   Substance and Sexual Activity    Alcohol use: No    Drug use: No    Sexual activity: Yes     Partners: Male     Social Drivers of Health     Financial Resource Strain: High Risk (7/8/2024)    Overall Financial Resource Strain (CARDIA)     Difficulty of Paying Living Expenses: Very hard   Food Insecurity: Food Insecurity Present (7/8/2024)    Hunger Vital Sign     Worried About Running Out of Food in the Last Year: Often true     Ran Out of Food in the Last Year: Often true   Physical Activity: Unknown (7/8/2024)    Exercise Vital Sign     Days of Exercise per Week: 0 days   Stress: No Stress Concern Present (7/8/2024)    Citizen of [place] [employer] - Occupational Stress Questionnaire     Feeling of Stress : Only a little   Housing Stability: High Risk (7/8/2024)    Housing Stability Vital Sign     Unable to Pay for Housing in the Last Year: Yes         Subjective      Review of Systems   Constitutional: Negative for appetite change, fatigue and unexpected weight change.   HENT: Negative for mouth sores.   Eyes: Negative for visual disturbance.   Respiratory: Negative for cough and shortness of breath.   Cardiovascular: Negative for chest pain.   Gastrointestinal: Negative for diarrhea.   Genitourinary: Negative for frequency.   Musculoskeletal: Negative for back pain.   Skin: Negative for rash.    Neurological: Negative for headaches.   Hematological: Negative for adenopathy.   Psychiatric/Behavioral: The patient is not nervous/anxious.   All other systems reviewed and are negative.     Objective    Physical Exam   Vitals:    10/24/24 1041   BP: 135/78   Pulse: 87   Resp: 16   Temp: 97.6 °F (36.4 °C)       Constitutional: patient is oriented to person, place, and time. patient appears well-developed and well-nourished. No distress.   HENT:   Right Ear: External ear normal.   Left Ear: External ear normal.   Nose: Nose normal.   Mouth/Throat: Oropharynx is clear and moist. No oropharyngeal exudate.   Teeth, gums and lips are normal   No sinus tenderness   Palate, tongue, posterior pharynx are normal   Eyes: Conjunctivae and lids are normal.   Neck: Trachea normal and normal range of motion. No thyromegaly   Cardiovascular: Normal rate, regular rhythm, normal heart sounds, intact distal pulses and normal pulses.   No murmur heard.   No edema, no tenderness in the extremities.   Pulmonary/Chest: Effort normal and breath sounds normal. No accessory muscle usage. patient has no wheezes..   Abdominal: Soft. Normal appearance and bowel sounds are normal. patient exhibits no distension and no mass. There is no hepatosplenomegaly. There is no tenderness.   Musculoskeletal: Normal range of motion.   Gait is normal   No clubbing, cyanosis     Lymphadenopathy:   Head (right side): No submental and no submandibular adenopathy present.   Head (left side): No submental and no submandibular adenopathy present.   patient has no cervical adenopathy.   Right: No supraclavicular adenopathy present.   Left: No supraclavicular adenopathy present.   Neurological: patient is alert and oriented to person, place, and time. patient has normal strength and normal reflexes. No sensory deficit. Gait normal.   Skin: Skin is warm, dry and intact. No bruising, no lesion and no rash noted. No cyanosis. Nails show no clubbing.   No lesions    Psychiatric: patient has a normal mood and affect. patient speech is normal and behavior is normal. Judgment normal. Cognition and memory are normal.   Vitals reviewed.     Lab Results   Component Value Date    WBC 6.69 06/21/2024    HGB 10.8 (L) 06/21/2024    HCT 32.4 (L) 06/21/2024    MCV 87 06/21/2024     06/21/2024       CMP  Sodium   Date Value Ref Range Status   06/21/2024 139 136 - 145 mmol/L Final     Potassium   Date Value Ref Range Status   06/21/2024 3.8 3.5 - 5.1 mmol/L Final     Chloride   Date Value Ref Range Status   06/21/2024 106 95 - 110 mmol/L Final     CO2   Date Value Ref Range Status   06/21/2024 23 23 - 29 mmol/L Final     Glucose   Date Value Ref Range Status   06/21/2024 152 (H) 70 - 110 mg/dL Final     BUN   Date Value Ref Range Status   06/21/2024 16 6 - 20 mg/dL Final     Creatinine   Date Value Ref Range Status   06/21/2024 0.8 0.5 - 1.4 mg/dL Final     Calcium   Date Value Ref Range Status   06/21/2024 9.0 8.7 - 10.5 mg/dL Final     Total Protein   Date Value Ref Range Status   06/21/2024 6.1 6.0 - 8.4 g/dL Final     Albumin   Date Value Ref Range Status   06/21/2024 3.5 3.5 - 5.2 g/dL Final     Total Bilirubin   Date Value Ref Range Status   06/21/2024 0.4 0.1 - 1.0 mg/dL Final     Comment:     For infants and newborns, interpretation of results should be based  on gestational age, weight and in agreement with clinical  observations.    Premature Infant recommended reference ranges:  Up to 24 hours.............<8.0 mg/dL  Up to 48 hours............<12.0 mg/dL  3-5 days..................<15.0 mg/dL  6-29 days.................<15.0 mg/dL       Alkaline Phosphatase   Date Value Ref Range Status   06/21/2024 75 55 - 135 U/L Final     AST   Date Value Ref Range Status   06/21/2024 16 10 - 40 U/L Final     ALT   Date Value Ref Range Status   06/21/2024 12 10 - 44 U/L Final     Anion Gap   Date Value Ref Range Status   06/21/2024 10 8 - 16 mmol/L Final     eGFR   Date Value Ref Range  Status   06/21/2024 >60.0 >60 mL/min/1.73 m^2 Final         Assessment    IV iron deficiency secondary to history of gastric bypass surgery for weight lost reduction.    Check CBC CMP iron panel and ferritin today.    I do not see she had any history of colonoscopy for screening purposes.  She is age of 54 years old should get one    Anticipation of IV iron gluconate x8 after completion of treatment repeat iron study for treatment response evaluation.    Plan    Anemia, unspecified  -     Ambulatory referral/consult to Hematology / Oncology

## 2024-10-24 NOTE — Clinical Note
Check labs today CBC iron panel iron studies ferritin and vitamin B12.  I also ordered IV iron gluconate x8.  After she completes the treatment we can see her with repeat CBC iron studies and ferritin

## 2024-10-31 ENCOUNTER — CLINICAL SUPPORT (OUTPATIENT)
Dept: REHABILITATION | Facility: HOSPITAL | Age: 54
End: 2024-10-31
Payer: MEDICAID

## 2024-10-31 DIAGNOSIS — D64.9 ANEMIA, UNSPECIFIED TYPE: Primary | ICD-10-CM

## 2024-10-31 DIAGNOSIS — Z74.09 IMPAIRED FUNCTIONAL MOBILITY, BALANCE, GAIT, AND ENDURANCE: Primary | ICD-10-CM

## 2024-10-31 PROCEDURE — 97110 THERAPEUTIC EXERCISES: CPT | Mod: PO,CQ

## 2024-11-05 ENCOUNTER — DOCUMENTATION ONLY (OUTPATIENT)
Dept: REHABILITATION | Facility: HOSPITAL | Age: 54
End: 2024-11-05
Payer: MEDICAID

## 2024-11-06 NOTE — PLAN OF CARE
[facility]  Speech Therapy Evaluation -Neurological Rehabilitation    Date: 11/7/2024     Name: Meg Lockhart   MRN: [id number]    Therapy Diagnosis: No diagnosis found. Physician: Mitra Mariee FNP  Physician Orders: Ambulatory Referral to Speech Therapy   Medical Diagnosis:  History of ischemic stroke [Z86.73]     Visit # / Visits Authorized:  1 / ***   Date of Evaluation:  11/7/2024   Insurance Authorization Period: 10/24/2024 to 12/31/2024  Plan of Care Certification:    11/7/2024 to ***      Time In:***   Time Out: ***   Total time: ***    Procedure   {CPT eval codes OP ST:76121}   {CPT eval codes OP ST:49408}     Precautions: {OP ST Precautions:56534}  Subjective   Date of Onset: ***  History of Current Condition:  Meg Lockhart is a 54 y.o. female who presents to [facility] Outpatient Speech Therapy for evaluation secondary to ***. Patient was referred to therapy by Mitra Mariee FNP , which is the patient's ***. Patient reports ***.   Patient was accompanied to the evaluation by ***, her ***.   Past Medical History: Meg Lockhart  has a past medical history of Anxiety, Diabetes mellitus, type 2, Elevated cholesterol, GERD (gastroesophageal reflux disease), Hypertension, and PUD (peptic ulcer disease).  Meg Lockhart  has a past surgical history that includes Hysterectomy; Gastric bypass; and Knee surgery (Left).  Medical Hx and Allergies: Meg has a current medication list which includes the following prescription(s): aspirin, atorvastatin, blood sugar diagnostic, blood-glucose meter, chlorthalidone, famotidine, furosemide, basaglar kwikpen u-100 insulin, insulin lispro, lancets, lorazepam, meclizine hcl, metformin, ozempic, and sitagliptin phosphate.   Review of patient's allergies indicates:   Allergen Reactions    Penicillins Itching and Anaphylaxis    Sulfa (sulfonamide antibiotics) Anaphylaxis    Meperidine Itching     Prior Therapy:   ***  Social History:  Patient lives with *** in ***, Patient {IS NOT/IS:07600} currently driving  Occupation:  ***  Prior Level of Function: ***   Current Level of Function: ***  Pain Scale: {Speech Pain Scale:74362}  Patient's Therapy Goals:  ***  Objective   Formal Assessment:  {ST Evaluation Assessments:38241}    Treatment   {SLP Treatment:72861}    Education provided:   -role of Speech Therapy, goals/plan of care, scheduling/cancellations, insurance limitations with patient  -Additional Education provided:   {SLP OP Education Eval:38221}    Patient {and and/or or:27384} family members expressed understanding.     Home Program: ***  Assessment     Vandana presents to [facility] status post medical diagnosis of ***.     Interpretation of objective assessment: ***  She presents with *** characterized by ***.    Demonstrates impairments including limitations as described in the problem list.     Positive prognostic factors: ***  Negative prognostic factors: ***  Barriers to therapy: {barriers to therapy:46415}     Patient's spiritual, cultural, and educational needs considered and patient agreeable to plan of care and goals.    Patient {will/will not:83876} benefit from skilled therapy.    Rehab Potential: {DESC; POOR/FAIR/GOOD/EXCELLENT:19665}    Short Term Goals: (*** {WEEKS/MONTHS EC:44154}) Current Progress:   ***    Progressing/ Not Met 11/7/2024   Established this date   ***     Progressing/ Not Met 11/7/2024   Established this date   ***     Progressing/ Not Met 11/7/2024   Established this date    ***     Progressing/ Not Met 11/7/2024   Established this date    ***     Progressing/ Not Met 11/7/2024   Established this date    ***     Progressing/ Not Met 11/7/2024   Established this date    ***     Progressing/ Not Met 11/7/2024   Established this date        Long Term Goals: (*** {WEEKS/MONTHS EC:07617}) Current Progress:   ***   Established this date     ***     Established this date      ***     Established this date     ***     Established this date     ***     Established this date     ***    Established this date     ***    Established this date         Plan     Recommended Treatment Plan:  Patient will participate in the [facility] rehabilitation program for speech therapy {NUMBER 1-5:72334} times per week for {NUMBER 1-16:57507} weeks to address her {SLP TREATMENT AREAS:0686251016} deficits, to educate patient and their family, and to participate in a home exercise program.    Other Recommendations:   {SLP recomendations:73488}    Therapist's Name:   YOANA Green-SLP, CCC-SLP   11/7/2024

## 2024-11-07 ENCOUNTER — CLINICAL SUPPORT (OUTPATIENT)
Dept: REHABILITATION | Facility: HOSPITAL | Age: 54
End: 2024-11-07
Payer: MEDICAID

## 2024-11-07 DIAGNOSIS — R41.841 COGNITIVE COMMUNICATION DEFICIT: Primary | ICD-10-CM

## 2024-11-07 DIAGNOSIS — Z86.73 HISTORY OF ISCHEMIC STROKE: ICD-10-CM

## 2024-11-07 PROCEDURE — 92523 SPEECH SOUND LANG COMPREHEN: CPT | Mod: PO

## 2024-11-07 NOTE — PLAN OF CARE
[facility]  Speech Therapy Evaluation -Neurological Rehabilitation    Date: 11/7/2024     Name: Meg Lockhart   MRN: [id number]    Therapy Diagnosis:   Encounter Diagnoses   Name Primary?    History of ischemic stroke     Cognitive communication deficit Yes     Physician: Mitra Mariee FNP  Physician Orders: Ambulatory Referral to Speech Therapy   Medical Diagnosis:  History of ischemic stroke [Z86.73]     Visit # / Visits Authorized:  1 / 1   Date of Evaluation:  11/7/2024   Insurance Authorization Period: 10/24/2024 to 12/31/2024  Plan of Care Certification:    11/7/2024 to 1/2/2024      Time In: 0900   Time Out: 0945   Total time: 45 minutes    FOTO #1 completed this date    Procedure   Speech Language Evaluation      Precautions: Standard and Fall  Subjective   Date of Onset: June 2024 s/p cerebellar stroke  History of Current Condition:  Meg Lockhart is a 54 y.o. female who presents to [facility] Speech Therapy for evaluation secondary to cognitive communication deficits after stroke. Patient was referred to therapy by Mitra Mariee FNP , which is the patient's hospitalist. Patient reports poor short term memory since her stroke.  Patient reports she did not seek Speech Therapy services immediately after her stroke despite having previous referrals because she thought her deficits would improve on their own and did not see the benefit of Speech Therapy services. She reports an ultimate goal to get back to work and realizes that she is having persistent cognitive deficits which are making it difficult to function in her current environment.     Past Medical History: Meg Lockhart  has a past medical history of Anxiety, Diabetes mellitus, type 2, Elevated cholesterol, GERD (gastroesophageal reflux disease), Hypertension, and PUD (peptic ulcer disease).  Meg Lockhart  has a past surgical history that includes Hysterectomy; Gastric  bypass; and Knee surgery (Left).  Medical Hx and Allergies: Meg has a current medication list which includes the following prescription(s): aspirin, atorvastatin, blood sugar diagnostic, blood-glucose meter, chlorthalidone, famotidine, furosemide, basaglar kwikpen u-100 insulin, insulin lispro, lancets, lorazepam, meclizine hcl, metformin, ozempic, and sitagliptin phosphate.   Review of patient's allergies indicates:   Allergen Reactions    Penicillins Itching and Anaphylaxis    Sulfa (sulfonamide antibiotics) Anaphylaxis    Meperidine Itching     Prior Therapy:  prior Speech Therapy in acute care immediately after stroke but no Speech Therapy services after acute admission  Social History:  Patient lives alone, Patient is currently driving  Occupation:  Prior  at [employer] for 23 years but was laid off prior to stroke  Prior Level of Function: independent   Current Level of Function: mild cognitive deficits and weakness after stroke; ambulatory without assistive device to this evaluation  Pain Scale: 0/10 on a Visual Analog Scale currently.  Pain Location: N/a  Patient's Therapy Goals:  improve cognitive functioning  Objective   Formal Assessment:  The Repeatable Battery for the Assessment of Neuropsychological Status (RBANS) Version C was administered to measure the patient's attention, language, visuospatial/constructional abilities, and immediate and delayed memory. The results are outlined below:    Domain Subtest Total Score Index Score   Immediate Memory List Learning 27   83    Story Memory 11    Visuospatial/  Constructional Figure Copy 19   92    Line Orientation 13    Language Picture Naming 9   84    Semantic Fluency 14    Attention Digit Span 9   75    Coding 27      Delayed Memory List Recall 7     86    List Recognition 18     Story Recall 6     Figure Recall 14        Total Scale   80     Percentile   9     Descriptor   Low Average   Interpretation:  Scaled score: mean of 10, standard  deviation of 3. Therefore, 16+ = Very Superior; 14-15 = Superior; 12-13 = High Average; 8-11 = Average; 6-7 = Low Average; 4-5 = Borderline; 3 and below = Extremely Low    Index score: mean of 100, standard deviation of 15. Therefore, 130+ = Very Superior; 120-129 = Superior; 110-119 = High average;  = Average; 80-89 = Low Average; 70-79 = Borderline; 69 and below = Extremely Low. Apparently the most reliable score; factor in education level.    Immediate Memory Score: Recalling information following immediate presentation is assessed through the List Learning and Story Memory subtests. In the List Learning subtest, the patient is given 10 words to remember. This list is presented four times overall. In this subtest, the patient did demonstrate learning over the 4 trials. The patient recalled 4 items on trial one, 8 items on trial two, 7 items on trial three, and 8 items in trial 4. In the Story Memory subtest, the patient recalled 5 details on the first presentation and 6 details on the second presentation. Results of this domain indicate Low average performance.  Visuospatial score: Perceiving spatial relations and constructing a spatially accurate copy of a drawing is assessed through the Figure Copy and Line Orientation subtests. The Figure Copy subtest asks the patient to copy a complex line drawing. The patient correctly copied 19/20 details. The Line Orientation subtest the presents the patient with 12 line displays and asks the patient to match two given lines at the bottom to the display at the top.  The patient correctly identified 13/20. Results of this domain indicate Average performance.  Language score: Naming common items and retrieving learned material is assessed through the Picture Naming and Semantic Fluency subtests. The Picture Naming subtest asks the patient to name 10 line drawings. The patient was able to accurately name 9/10 items. The Semantic Fluency subtest asks the patient to name  as many musical instruments as she can in 60 seconds. The patient was able to name 10 musical instruments. These results indicate Low average performance.   Attention score: Attending to, holding and manipulating information presented visually and orally in working memory is assessed with use of the Digit Span and Coding subtests. The Digit Span subtest asks the patient to repeat progressively lengthening strings of numbers. The Coding subtest asks the patient to alternate attention between a key the given work and then to decode symbols to numbers. The patients results on these subtest indicate Borderline  performance.  Delayed Memory score: Anterograde memory capacity is assessed through the List Recall, List Recognition, Story Recall, and Figure Recall subtests. The List Recall subtest asks the patient to recall items from the list presented at the beginning of the test. The patient was able to recall 7/10 items. The List Recognition subtest has the patient recall whether a word was or was not in the original list. The patient accurately identified whether a word was or was not on the list in 18 of 20 items. On the Story Recall subtest, the patient was able to recall 6 details. Finally, on the Figure Recall, the patient recalled 14 details. Results of this domain indicate Low average performance.    Overall, according to the RBANS research, total Scale index is a good indicator of general cognitive functioning. The patient presents with a mild cognitive communication disorder charaterized by deficits in immediate and delayed memory, language, and attention. Deficits in attention are a definite contributing factor to patient's memory difficulty. Current deficits are negatively impacting overall executive functions and compromise patient's efficiency and efficacy in completing tasks.       Treatment   Total Treatment Time Separate from Evaluation: not applicable   No treatment performed secondary to time to  complete evaluation.     Education provided:   -role of Speech Therapy, goals/plan of care, scheduling/cancellations, insurance limitations with patient    Patient expressed understanding.     Home Program: Not yet established  Assessment     Vandana presents to [facility] status post medical diagnosis of History of ischemic stroke [Z86.73] .     Interpretation of objective assessment:   She presents with a mild cognitive communication disorder characterized by deficits in immediate and delayed memory, language, and attention. Deficits in attention are a definite contributing factor to patient's memory difficulty. Current deficits are negatively impacting overall executive functions and compromise patient's efficiency and efficacy in completing tasks.  She will benefit from ongoing skilled outpatient Speech Therapy intervention to target improving overall cognitive communication skills and developing appropriate compensations to increase efficiency and independence and work toward patient's goal of going back to work.    Demonstrates impairments including limitations as described in the problem list.     Positive prognostic factors: patient motivation  Negative prognostic factors: time since onset  Barriers to therapy: No barriers to therapy identified.     Patient's spiritual, cultural, and educational needs considered and patient agreeable to plan of care and goals.    Patient will benefit from skilled therapy.    Rehab Potential: good    Short Term Goals: (6 weeks) Current Progress:   Patient will complete alternating attention tasks with natural background noise with 90% accuracy independently.    Progressing/ Not Met 11/7/2024   Established this date   2.  Patient will complete sustained attention tasks with natural background noise with 90% accuracy independently.    Progressing/ Not Met 11/7/2024   Established this date   3.  Patient will prioritize daily activities (work or home) by making a  list with 3-4 target tasks for the day in a prioritized list and demonstrate and understanding of the rationale to reduced cognitive load    Progressing/ Not Met 11/7/2024   Established this date    4.  Patient will demonstrate awareness of cognitive-communication difficulties in 1-2 situations in her day in which cognitive deficits could or did compromise efficiency and performance with minimal cueing.    Progressing/ Not Met 11/7/2024   Established this date    5.  Patient will complete Goal-Plan-Action-Review with 90% accuracy independently.     Progressing/ Not Met 11/7/2024   Established this date    6.  Patient will recall 4/4 memory strategies and discuss how she is applying strategies at home 3 times overall    Progressing/ Not Met 11/7/2024   Established this date        Long Term Goals: (8 weeks) Current Progress:   Patient will improve overall cognitive communication skills to improve functioning in the home environment   Established this date     2. Patient will develop appropriate compensations for cognitive communication deficits to function more efficiently and effectively at home     Established this date         Plan     Recommended Treatment Plan:  Patient will participate in the [facility] rehabilitation program for speech therapy 2 times per week for 8 weeks to address her Cognition deficits, to educate patient and their family, and to participate in a home exercise program.      Therapist's Name:   YOANA Green-SLP, JACKELINE-SLP   11/7/2024

## 2024-11-13 RX ORDER — SODIUM CHLORIDE 0.9 % (FLUSH) 0.9 %
10 SYRINGE (ML) INJECTION
OUTPATIENT
Start: 2024-11-13

## 2024-11-13 RX ORDER — HEPARIN 100 UNIT/ML
500 SYRINGE INTRAVENOUS
OUTPATIENT
Start: 2024-11-13

## 2024-11-13 RX ORDER — EPINEPHRINE 0.3 MG/.3ML
0.3 INJECTION SUBCUTANEOUS ONCE AS NEEDED
OUTPATIENT
Start: 2024-11-13

## 2024-11-13 RX ORDER — DIPHENHYDRAMINE HYDROCHLORIDE 50 MG/ML
50 INJECTION INTRAMUSCULAR; INTRAVENOUS ONCE AS NEEDED
OUTPATIENT
Start: 2024-11-13

## 2024-11-22 ENCOUNTER — INFUSION (OUTPATIENT)
Dept: INFUSION THERAPY | Facility: HOSPITAL | Age: 54
End: 2024-11-22
Attending: INTERNAL MEDICINE
Payer: MEDICAID

## 2024-11-22 VITALS
DIASTOLIC BLOOD PRESSURE: 86 MMHG | WEIGHT: 191.5 LBS | RESPIRATION RATE: 18 BRPM | OXYGEN SATURATION: 100 % | BODY MASS INDEX: 31.9 KG/M2 | SYSTOLIC BLOOD PRESSURE: 174 MMHG | HEIGHT: 65 IN | TEMPERATURE: 97 F | HEART RATE: 85 BPM

## 2024-11-22 DIAGNOSIS — D50.9 IRON DEFICIENCY ANEMIA, UNSPECIFIED IRON DEFICIENCY ANEMIA TYPE: Primary | ICD-10-CM

## 2024-11-22 PROCEDURE — 25000003 PHARM REV CODE 250: Performed by: INTERNAL MEDICINE

## 2024-11-22 PROCEDURE — 63600175 PHARM REV CODE 636 W HCPCS: Performed by: INTERNAL MEDICINE

## 2024-11-22 PROCEDURE — 96374 THER/PROPH/DIAG INJ IV PUSH: CPT

## 2024-11-22 RX ORDER — EPINEPHRINE 0.3 MG/.3ML
0.3 INJECTION SUBCUTANEOUS ONCE AS NEEDED
OUTPATIENT
Start: 2024-11-29

## 2024-11-22 RX ORDER — HEPARIN 100 UNIT/ML
500 SYRINGE INTRAVENOUS
OUTPATIENT
Start: 2024-11-29

## 2024-11-22 RX ORDER — DIPHENHYDRAMINE HYDROCHLORIDE 50 MG/ML
50 INJECTION INTRAMUSCULAR; INTRAVENOUS ONCE AS NEEDED
OUTPATIENT
Start: 2024-11-29

## 2024-11-22 RX ORDER — SODIUM CHLORIDE 0.9 % (FLUSH) 0.9 %
10 SYRINGE (ML) INJECTION
Status: DISCONTINUED | OUTPATIENT
Start: 2024-11-22 | End: 2024-11-22 | Stop reason: HOSPADM

## 2024-11-22 RX ORDER — SODIUM CHLORIDE 0.9 % (FLUSH) 0.9 %
10 SYRINGE (ML) INJECTION
OUTPATIENT
Start: 2024-11-29

## 2024-11-22 RX ADMIN — SODIUM CHLORIDE 125 MG: 9 INJECTION, SOLUTION INTRAVENOUS at 01:11

## 2024-11-25 ENCOUNTER — PATIENT MESSAGE (OUTPATIENT)
Dept: HEMATOLOGY/ONCOLOGY | Facility: CLINIC | Age: 54
End: 2024-11-25
Payer: MEDICAID

## 2024-12-04 ENCOUNTER — PATIENT OUTREACH (OUTPATIENT)
Dept: ADMINISTRATIVE | Facility: HOSPITAL | Age: 54
End: 2024-12-04
Payer: MEDICAID

## 2024-12-04 ENCOUNTER — PATIENT MESSAGE (OUTPATIENT)
Dept: ADMINISTRATIVE | Facility: HOSPITAL | Age: 54
End: 2024-12-04
Payer: MEDICAID

## 2024-12-04 DIAGNOSIS — Z12.31 OTHER SCREENING MAMMOGRAM: ICD-10-CM

## 2024-12-12 ENCOUNTER — INFUSION (OUTPATIENT)
Dept: INFUSION THERAPY | Facility: HOSPITAL | Age: 54
End: 2024-12-12
Attending: STUDENT IN AN ORGANIZED HEALTH CARE EDUCATION/TRAINING PROGRAM
Payer: MEDICAID

## 2024-12-12 VITALS
DIASTOLIC BLOOD PRESSURE: 93 MMHG | RESPIRATION RATE: 17 BRPM | SYSTOLIC BLOOD PRESSURE: 182 MMHG | TEMPERATURE: 99 F | HEART RATE: 85 BPM | OXYGEN SATURATION: 100 %

## 2024-12-12 DIAGNOSIS — D50.9 IRON DEFICIENCY ANEMIA, UNSPECIFIED IRON DEFICIENCY ANEMIA TYPE: Primary | ICD-10-CM

## 2024-12-12 PROCEDURE — 25000003 PHARM REV CODE 250: Performed by: INTERNAL MEDICINE

## 2024-12-12 PROCEDURE — 96365 THER/PROPH/DIAG IV INF INIT: CPT

## 2024-12-12 PROCEDURE — A4216 STERILE WATER/SALINE, 10 ML: HCPCS | Performed by: INTERNAL MEDICINE

## 2024-12-12 PROCEDURE — 63600175 PHARM REV CODE 636 W HCPCS: Performed by: INTERNAL MEDICINE

## 2024-12-12 RX ORDER — DIPHENHYDRAMINE HYDROCHLORIDE 50 MG/ML
50 INJECTION INTRAMUSCULAR; INTRAVENOUS ONCE AS NEEDED
OUTPATIENT
Start: 2024-12-19

## 2024-12-12 RX ORDER — EPINEPHRINE 0.3 MG/.3ML
0.3 INJECTION SUBCUTANEOUS ONCE AS NEEDED
OUTPATIENT
Start: 2024-12-19

## 2024-12-12 RX ORDER — HEPARIN 100 UNIT/ML
500 SYRINGE INTRAVENOUS
OUTPATIENT
Start: 2024-12-19

## 2024-12-12 RX ORDER — SODIUM CHLORIDE 0.9 % (FLUSH) 0.9 %
10 SYRINGE (ML) INJECTION
Status: DISCONTINUED | OUTPATIENT
Start: 2024-12-12 | End: 2024-12-12 | Stop reason: HOSPADM

## 2024-12-12 RX ORDER — SODIUM CHLORIDE 0.9 % (FLUSH) 0.9 %
10 SYRINGE (ML) INJECTION
OUTPATIENT
Start: 2024-12-19

## 2024-12-12 RX ADMIN — SODIUM CHLORIDE 125 MG: 9 INJECTION, SOLUTION INTRAVENOUS at 03:12

## 2024-12-12 RX ADMIN — Medication 10 ML: at 04:12

## 2024-12-12 NOTE — PLAN OF CARE
Problem: Adult Inpatient Plan of Care  Goal: Plan of Care Review  12/12/2024 1611 by Noris De La Torre RN  Outcome: Met  12/12/2024 1611 by Noris De La Torre RN  Outcome: Progressing  Goal: Patient-Specific Goal (Individualized)  12/12/2024 1611 by Noris De La Torre RN  Outcome: Met  12/12/2024 1611 by Noris De La Torre RN  Outcome: Progressing  Goal: Absence of Hospital-Acquired Illness or Injury  12/12/2024 1611 by Noris De La Torre RN  Outcome: Met  12/12/2024 1611 by Noris De La Torre RN  Outcome: Progressing  Goal: Optimal Comfort and Wellbeing  12/12/2024 1611 by Noris De La Torre RN  Outcome: Met  12/12/2024 1611 by Noris De La Torre RN  Outcome: Progressing  Goal: Readiness for Transition of Care  12/12/2024 1611 by Noris De La Torre RN  Outcome: Met  12/12/2024 1611 by Noris De La Torre RN  Outcome: Progressing

## 2024-12-17 ENCOUNTER — PATIENT MESSAGE (OUTPATIENT)
Dept: ADMINISTRATIVE | Facility: HOSPITAL | Age: 54
End: 2024-12-17
Payer: MEDICAID

## 2024-12-30 ENCOUNTER — DOCUMENTATION ONLY (OUTPATIENT)
Dept: REHABILITATION | Facility: HOSPITAL | Age: 54
End: 2024-12-30
Payer: MEDICAID

## 2024-12-30 NOTE — PROGRESS NOTES
Outpatient Therapy Discharge Summary     Name: Meg Ross Lowai  [facility] Number: [id number]    Physician Orders: PT Eval and Treat   Medical Diagnosis from Referral:   Diagnosis   I63.9 (ICD-10-CM) - Cerebellar stroke, acute      Evaluation Date: 7/16/2024  Authorization Period Expiration: 12/31/24  Plan of Care Expiration: 11/5/24.  Progress Note Due: 8/16/24  Visit # / Visits authorized: 12/16(13)  FOTO: 1/5  Date of Discharge Note:  12/30/2024    Date of Last visit: 10/31/24      Assessment    Vandana is appropriate for discharge at this time. Pt has met 2/3 STG and 3/8 LTG.Pt has not attended therapy since 10/31/24. Pt is appropriate for discharge from outpatient PT at this time with HEP.         Discharge reason : Patient self discharge      Goals:   Short Term Goals: 4 weeks Date last assessed:  Status:    1. Patient will be provided with an HEP for strength, endurance and balance.  7/16/2024    MET   2.  Patient will demonstrate improve endurance and activity tolerance as evidenced by ability to perform Nu-step x 15 minutes without rests breaks. 8/1/24  MET   3. Pt will improve 5x sit to stand score from 24/3 sec to less than 18 seconds to decrease fall risk.  9/10/24    NOT MET         Long Term Goals: 8 weeks  Date last assessed: Status:    1. Patient will be independent and compliant with updated HEP. 9/10/24    NOT MET   2.  Patient will increase her   gait speed as assessed by the timed 10MWT from 0.92 m/s to  m/s to increase her safety and (I) with gait at a community level. (MDC for CVA= 0.14 m/s)     9/10/24    MET   3. The patient will demonstrate improved efficiency with functional mobility and decreased risk for falls as evidenced by an increase in her score on the Timed up and Go from 19.7 sec to 16.8 sec. (Minimal detectable change in chronic stroke = 2.9 seconds)  9/10/24 MET   4.Patient will improve her FOTO score from 59  to at least 72 for improved self perception of functional  mobility.(score 0-100, high score indicates greater level of function) 9/10/24 NOT MEt   5.  Patient will demonstrated improved score on the Carreno Balance Scale from 32/56 to 39/56 to demonstrate improved functional mobility, balance and decreased risk for falls. (MDC for acute CVA= 7 points; MDC for chronic CVA =5 points)    9/10/24    MET   6. Patient will increase her   gait speed as assessed by the timed 10MWT from 0.61 m/s to 0.75 m/s to increase her safety and (I) with gait at a community level. (MDC for CVA= 0.14 m/s) 9/10/24 NOT MET   7. The patient will demonstrate improved efficiency with functional mobility and decreased risk for falls as evidenced by an increase in her score on the Timed up and Go from 12.6 sec to 9.7 sec. (Minimal detectable change in chronic stroke = 2.9 seconds)  9/10/24 NOT MET   8. Patient will demonstrated improved score on the Carreno Balance Scale from 42/56 to 49/56 to demonstrate improved functional mobility, balance and decreased risk for falls. (MDC for acute CVA= 7 points; MDC for chronic CVA =5 points) 9/10/24 NOT MET           Plan   Pt is appropriate for discharge from PT with HEP at this time.      Maddy Childs, PT  12/30/2024

## 2025-01-16 ENCOUNTER — INFUSION (OUTPATIENT)
Dept: INFUSION THERAPY | Facility: HOSPITAL | Age: 55
End: 2025-01-16
Attending: INTERNAL MEDICINE
Payer: MEDICAID

## 2025-01-16 VITALS
RESPIRATION RATE: 16 BRPM | HEIGHT: 65 IN | OXYGEN SATURATION: 99 % | WEIGHT: 190 LBS | TEMPERATURE: 98 F | DIASTOLIC BLOOD PRESSURE: 81 MMHG | HEART RATE: 90 BPM | SYSTOLIC BLOOD PRESSURE: 144 MMHG | BODY MASS INDEX: 31.65 KG/M2

## 2025-01-16 DIAGNOSIS — D50.9 IRON DEFICIENCY ANEMIA, UNSPECIFIED IRON DEFICIENCY ANEMIA TYPE: Primary | ICD-10-CM

## 2025-01-16 PROCEDURE — 25000003 PHARM REV CODE 250: Performed by: INTERNAL MEDICINE

## 2025-01-16 PROCEDURE — 63600175 PHARM REV CODE 636 W HCPCS: Performed by: INTERNAL MEDICINE

## 2025-01-16 PROCEDURE — 96365 THER/PROPH/DIAG IV INF INIT: CPT

## 2025-01-16 PROCEDURE — A4216 STERILE WATER/SALINE, 10 ML: HCPCS | Performed by: INTERNAL MEDICINE

## 2025-01-16 RX ORDER — SODIUM CHLORIDE 0.9 % (FLUSH) 0.9 %
10 SYRINGE (ML) INJECTION
Status: DISCONTINUED | OUTPATIENT
Start: 2025-01-16 | End: 2025-01-16 | Stop reason: HOSPADM

## 2025-01-16 RX ORDER — SODIUM CHLORIDE 0.9 % (FLUSH) 0.9 %
10 SYRINGE (ML) INJECTION
Status: CANCELLED | OUTPATIENT
Start: 2025-01-23

## 2025-01-16 RX ORDER — HEPARIN 100 UNIT/ML
500 SYRINGE INTRAVENOUS
Status: CANCELLED | OUTPATIENT
Start: 2025-01-23

## 2025-01-16 RX ORDER — EPINEPHRINE 0.3 MG/.3ML
0.3 INJECTION SUBCUTANEOUS ONCE AS NEEDED
Status: CANCELLED | OUTPATIENT
Start: 2025-01-23

## 2025-01-16 RX ORDER — DIPHENHYDRAMINE HYDROCHLORIDE 50 MG/ML
50 INJECTION INTRAMUSCULAR; INTRAVENOUS ONCE AS NEEDED
Status: CANCELLED | OUTPATIENT
Start: 2025-01-23

## 2025-01-16 RX ADMIN — Medication 10 ML: at 08:01

## 2025-01-16 RX ADMIN — SODIUM CHLORIDE 125 MG: 9 INJECTION, SOLUTION INTRAVENOUS at 08:01

## 2025-01-16 RX ADMIN — Medication 10 ML: at 10:01

## 2025-01-16 NOTE — PLAN OF CARE
Problem: Adult Inpatient Plan of Care  Goal: Optimal Comfort and Wellbeing  Outcome: Progressing  Intervention: Provide Person-Centered Care  Flowsheets (Taken 1/16/2025 5144)  Trust Relationship/Rapport:   care explained   choices provided   emotional support provided   empathic listening provided   questions answered   questions encouraged   reassurance provided   thoughts/feelings acknowledged

## 2025-01-27 DIAGNOSIS — R82.998 HIGH URINE CREATINE: Primary | ICD-10-CM

## 2025-01-30 ENCOUNTER — INFUSION (OUTPATIENT)
Dept: INFUSION THERAPY | Facility: HOSPITAL | Age: 55
End: 2025-01-30
Attending: INTERNAL MEDICINE
Payer: MEDICAID

## 2025-01-30 VITALS
RESPIRATION RATE: 16 BRPM | OXYGEN SATURATION: 99 % | HEIGHT: 65 IN | DIASTOLIC BLOOD PRESSURE: 77 MMHG | SYSTOLIC BLOOD PRESSURE: 139 MMHG | WEIGHT: 190.25 LBS | TEMPERATURE: 98 F | HEART RATE: 84 BPM | BODY MASS INDEX: 31.7 KG/M2

## 2025-01-30 DIAGNOSIS — D50.9 IRON DEFICIENCY ANEMIA, UNSPECIFIED IRON DEFICIENCY ANEMIA TYPE: Primary | ICD-10-CM

## 2025-01-30 PROCEDURE — 25000003 PHARM REV CODE 250: Performed by: INTERNAL MEDICINE

## 2025-01-30 PROCEDURE — 63600175 PHARM REV CODE 636 W HCPCS: Performed by: INTERNAL MEDICINE

## 2025-01-30 PROCEDURE — A4216 STERILE WATER/SALINE, 10 ML: HCPCS | Performed by: INTERNAL MEDICINE

## 2025-01-30 PROCEDURE — 96365 THER/PROPH/DIAG IV INF INIT: CPT

## 2025-01-30 RX ORDER — SODIUM CHLORIDE 0.9 % (FLUSH) 0.9 %
10 SYRINGE (ML) INJECTION
Status: DISCONTINUED | OUTPATIENT
Start: 2025-01-30 | End: 2025-01-30 | Stop reason: HOSPADM

## 2025-01-30 RX ORDER — EPINEPHRINE 0.3 MG/.3ML
0.3 INJECTION SUBCUTANEOUS ONCE AS NEEDED
OUTPATIENT
Start: 2025-02-06

## 2025-01-30 RX ORDER — SODIUM CHLORIDE 0.9 % (FLUSH) 0.9 %
10 SYRINGE (ML) INJECTION
OUTPATIENT
Start: 2025-02-06

## 2025-01-30 RX ORDER — HEPARIN 100 UNIT/ML
500 SYRINGE INTRAVENOUS
OUTPATIENT
Start: 2025-02-06

## 2025-01-30 RX ORDER — DIPHENHYDRAMINE HYDROCHLORIDE 50 MG/ML
50 INJECTION INTRAMUSCULAR; INTRAVENOUS ONCE AS NEEDED
OUTPATIENT
Start: 2025-02-06

## 2025-01-30 RX ADMIN — SODIUM CHLORIDE 125 MG: 9 INJECTION, SOLUTION INTRAVENOUS at 08:01

## 2025-01-30 RX ADMIN — Medication 10 ML: at 08:01

## 2025-01-30 RX ADMIN — Medication 10 ML: at 09:01

## 2025-01-30 NOTE — PLAN OF CARE
Problem: Adult Inpatient Plan of Care  Goal: Optimal Comfort and Wellbeing  Outcome: Progressing  Intervention: Provide Person-Centered Care  Flowsheets (Taken 1/30/2025 7470)  Trust Relationship/Rapport:   care explained   reassurance provided   choices provided   thoughts/feelings acknowledged   emotional support provided   empathic listening provided   questions answered   questions encouraged

## 2025-02-03 ENCOUNTER — TELEPHONE (OUTPATIENT)
Dept: HEMATOLOGY/ONCOLOGY | Facility: CLINIC | Age: 55
End: 2025-02-03
Payer: MEDICAID

## 2025-02-03 NOTE — TELEPHONE ENCOUNTER
Spoke with pt to confirm both lab appt for 2/6 @ 10:00 am and appt with Dr. Forman for 2/10 @ 10:40 am.

## 2025-02-06 ENCOUNTER — LAB VISIT (OUTPATIENT)
Dept: LAB | Facility: HOSPITAL | Age: 55
End: 2025-02-06
Attending: INTERNAL MEDICINE
Payer: MEDICAID

## 2025-02-06 DIAGNOSIS — D50.9 IRON DEFICIENCY ANEMIA, UNSPECIFIED IRON DEFICIENCY ANEMIA TYPE: ICD-10-CM

## 2025-02-06 DIAGNOSIS — Z98.0: ICD-10-CM

## 2025-02-06 DIAGNOSIS — E11.9 TYPE 2 DIABETES MELLITUS WITHOUT COMPLICATION: ICD-10-CM

## 2025-02-06 DIAGNOSIS — D64.9 ANEMIA, UNSPECIFIED: ICD-10-CM

## 2025-02-06 LAB
ALBUMIN/CREAT UR: 278.5 UG/MG (ref 0–30)
BASOPHILS # BLD AUTO: 0.02 K/UL (ref 0–0.2)
BASOPHILS NFR BLD: 0.3 % (ref 0–1.9)
CREAT UR-MCNC: 97.6 MG/DL (ref 15–325)
DIFFERENTIAL METHOD BLD: ABNORMAL
EOSINOPHIL # BLD AUTO: 0.1 K/UL (ref 0–0.5)
EOSINOPHIL NFR BLD: 1.2 % (ref 0–8)
ERYTHROCYTE [DISTWIDTH] IN BLOOD BY AUTOMATED COUNT: 13.3 % (ref 11.5–14.5)
FERRITIN SERPL-MCNC: 295.8 NG/ML (ref 20–300)
HCT VFR BLD AUTO: 34.6 % (ref 37–48.5)
HGB BLD-MCNC: 11.4 G/DL (ref 12–16)
IMM GRANULOCYTES # BLD AUTO: 0.02 K/UL (ref 0–0.04)
IMM GRANULOCYTES NFR BLD AUTO: 0.3 % (ref 0–0.5)
IRON SERPL-MCNC: 84 UG/DL (ref 30–160)
LYMPHOCYTES # BLD AUTO: 2.5 K/UL (ref 1–4.8)
LYMPHOCYTES NFR BLD: 34.3 % (ref 18–48)
MCH RBC QN AUTO: 29.1 PG (ref 27–31)
MCHC RBC AUTO-ENTMCNC: 32.9 G/DL (ref 32–36)
MCV RBC AUTO: 88 FL (ref 82–98)
MICROALBUMIN UR DL<=1MG/L-MCNC: 271.8 UG/ML
MONOCYTES # BLD AUTO: 0.5 K/UL (ref 0.3–1)
MONOCYTES NFR BLD: 6.2 % (ref 4–15)
NEUTROPHILS # BLD AUTO: 4.3 K/UL (ref 1.8–7.7)
NEUTROPHILS NFR BLD: 57.7 % (ref 38–73)
NRBC BLD-RTO: 0 /100 WBC
PLATELET # BLD AUTO: 338 K/UL (ref 150–450)
PMV BLD AUTO: 10.4 FL (ref 9.2–12.9)
RBC # BLD AUTO: 3.92 M/UL (ref 4–5.4)
SATURATED IRON: 28 % (ref 20–50)
TOTAL IRON BINDING CAPACITY: 301 UG/DL (ref 250–450)
TRANSFERRIN SERPL-MCNC: 215 MG/DL (ref 200–375)
WBC # BLD AUTO: 7.41 K/UL (ref 3.9–12.7)

## 2025-02-06 PROCEDURE — 82728 ASSAY OF FERRITIN: CPT | Performed by: INTERNAL MEDICINE

## 2025-02-06 PROCEDURE — 83540 ASSAY OF IRON: CPT | Performed by: INTERNAL MEDICINE

## 2025-02-06 PROCEDURE — 85025 COMPLETE CBC W/AUTO DIFF WBC: CPT | Performed by: INTERNAL MEDICINE

## 2025-02-06 PROCEDURE — 82570 ASSAY OF URINE CREATININE: CPT | Performed by: STUDENT IN AN ORGANIZED HEALTH CARE EDUCATION/TRAINING PROGRAM

## 2025-02-06 PROCEDURE — 83036 HEMOGLOBIN GLYCOSYLATED A1C: CPT | Performed by: STUDENT IN AN ORGANIZED HEALTH CARE EDUCATION/TRAINING PROGRAM

## 2025-02-06 PROCEDURE — 36415 COLL VENOUS BLD VENIPUNCTURE: CPT | Performed by: INTERNAL MEDICINE

## 2025-02-07 ENCOUNTER — TELEPHONE (OUTPATIENT)
Dept: FAMILY MEDICINE | Facility: CLINIC | Age: 55
End: 2025-02-07
Payer: MEDICAID

## 2025-02-07 LAB
ESTIMATED AVG GLUCOSE: 240 MG/DL (ref 68–131)
HBA1C MFR BLD: 10 % (ref 4.5–6.2)

## 2025-02-07 NOTE — TELEPHONE ENCOUNTER
----- Message from Meera Andres MD sent at 2/7/2025  7:51 AM CST -----  Please let her know that there is excessive amount of protein in her urine and I need to adjust some of her medications.  Please help her with a follow up with me thanks.

## 2025-02-10 ENCOUNTER — OFFICE VISIT (OUTPATIENT)
Dept: HEMATOLOGY/ONCOLOGY | Facility: CLINIC | Age: 55
End: 2025-02-10
Payer: MEDICAID

## 2025-02-10 ENCOUNTER — TELEPHONE (OUTPATIENT)
Dept: FAMILY MEDICINE | Facility: CLINIC | Age: 55
End: 2025-02-10
Payer: MEDICAID

## 2025-02-10 VITALS
HEART RATE: 92 BPM | DIASTOLIC BLOOD PRESSURE: 88 MMHG | WEIGHT: 185.44 LBS | HEIGHT: 65 IN | BODY MASS INDEX: 30.89 KG/M2 | RESPIRATION RATE: 18 BRPM | TEMPERATURE: 97 F | OXYGEN SATURATION: 98 % | SYSTOLIC BLOOD PRESSURE: 154 MMHG

## 2025-02-10 DIAGNOSIS — Z98.0: ICD-10-CM

## 2025-02-10 DIAGNOSIS — D50.9 IRON DEFICIENCY ANEMIA, UNSPECIFIED IRON DEFICIENCY ANEMIA TYPE: Primary | ICD-10-CM

## 2025-02-10 PROCEDURE — 3008F BODY MASS INDEX DOCD: CPT | Mod: CPTII,,, | Performed by: INTERNAL MEDICINE

## 2025-02-10 PROCEDURE — 3060F POS MICROALBUMINURIA REV: CPT | Mod: CPTII,,, | Performed by: INTERNAL MEDICINE

## 2025-02-10 PROCEDURE — 1159F MED LIST DOCD IN RCRD: CPT | Mod: CPTII,,, | Performed by: INTERNAL MEDICINE

## 2025-02-10 PROCEDURE — 99214 OFFICE O/P EST MOD 30 MIN: CPT | Mod: S$PBB,,, | Performed by: INTERNAL MEDICINE

## 2025-02-10 PROCEDURE — 3046F HEMOGLOBIN A1C LEVEL >9.0%: CPT | Mod: CPTII,,, | Performed by: INTERNAL MEDICINE

## 2025-02-10 PROCEDURE — 3079F DIAST BP 80-89 MM HG: CPT | Mod: CPTII,,, | Performed by: INTERNAL MEDICINE

## 2025-02-10 PROCEDURE — 3077F SYST BP >= 140 MM HG: CPT | Mod: CPTII,,, | Performed by: INTERNAL MEDICINE

## 2025-02-10 PROCEDURE — 3066F NEPHROPATHY DOC TX: CPT | Mod: CPTII,,, | Performed by: INTERNAL MEDICINE

## 2025-02-10 PROCEDURE — 99214 OFFICE O/P EST MOD 30 MIN: CPT | Mod: PBBFAC,PN | Performed by: INTERNAL MEDICINE

## 2025-02-10 PROCEDURE — 99999 PR PBB SHADOW E&M-EST. PATIENT-LVL IV: CPT | Mod: PBBFAC,,, | Performed by: INTERNAL MEDICINE

## 2025-02-10 PROCEDURE — G2211 COMPLEX E/M VISIT ADD ON: HCPCS | Mod: S$PBB,,, | Performed by: INTERNAL MEDICINE

## 2025-02-10 NOTE — PROGRESS NOTES
HPI    54 years old female referred to [facility] for evaluation of IV iron therapy.  Patient has history of iron deficiency anemia.  Status post gastric bypass surgery for weight loss.  She is complaining of fatigue malaise.          Past Medical History:   Diagnosis Date    Anxiety     Diabetes mellitus, type 2     Elevated cholesterol     GERD (gastroesophageal reflux disease)     Hypertension     PUD (peptic ulcer disease)      Social History     Socioeconomic History    Marital status:    Tobacco Use    Smoking status: Never     Passive exposure: Never    Smokeless tobacco: Never   Substance and Sexual Activity    Alcohol use: No    Drug use: No    Sexual activity: Yes     Partners: Male     Social Drivers of Health     Financial Resource Strain: High Risk (7/8/2024)    Overall Financial Resource Strain (CARDIA)     Difficulty of Paying Living Expenses: Very hard   Food Insecurity: Food Insecurity Present (7/8/2024)    Hunger Vital Sign     Worried About Running Out of Food in the Last Year: Often true     Ran Out of Food in the Last Year: Often true   Physical Activity: Unknown (7/8/2024)    Exercise Vital Sign     Days of Exercise per Week: 0 days   Stress: No Stress Concern Present (7/8/2024)    [employer] [employer] - Occupational Stress Questionnaire     Feeling of Stress : Only a little   Housing Stability: High Risk (7/8/2024)    Housing Stability Vital Sign     Unable to Pay for Housing in the Last Year: Yes         Subjective      Review of Systems   Constitutional: Negative for appetite change, fatigue and unexpected weight change.   HENT: Negative for mouth sores.   Eyes: Negative for visual disturbance.   Respiratory: Negative for cough and shortness of breath.   Cardiovascular: Negative for chest pain.   Gastrointestinal: Negative for diarrhea.   Genitourinary: Negative for frequency.   Musculoskeletal: Negative for back pain.   Skin: Negative for rash.    Neurological: Negative for headaches.   Hematological: Negative for adenopathy.   Psychiatric/Behavioral: The patient is not nervous/anxious.   All other systems reviewed and are negative.     Objective    Physical Exam   Vitals:    02/10/25 1035   BP: (!) 154/88   Pulse: 92   Resp: 18   Temp: 97.3 °F (36.3 °C)       Constitutional: patient is oriented to person, place, and time. patient appears well-developed and well-nourished. No distress.   HENT:   Right Ear: External ear normal.   Left Ear: External ear normal.   Nose: Nose normal.   Mouth/Throat: Oropharynx is clear and moist. No oropharyngeal exudate.   Teeth, gums and lips are normal   No sinus tenderness   Palate, tongue, posterior pharynx are normal   Eyes: Conjunctivae and lids are normal.   Neck: Trachea normal and normal range of motion. No thyromegaly   Cardiovascular: Normal rate, regular rhythm, normal heart sounds, intact distal pulses and normal pulses.   No murmur heard.   No edema, no tenderness in the extremities.   Pulmonary/Chest: Effort normal and breath sounds normal. No accessory muscle usage. patient has no wheezes..   Abdominal: Soft. Normal appearance and bowel sounds are normal. patient exhibits no distension and no mass. There is no hepatosplenomegaly. There is no tenderness.   Musculoskeletal: Normal range of motion.   Gait is normal   No clubbing, cyanosis     Lymphadenopathy:   Head (right side): No submental and no submandibular adenopathy present.   Head (left side): No submental and no submandibular adenopathy present.   patient has no cervical adenopathy.   Right: No supraclavicular adenopathy present.   Left: No supraclavicular adenopathy present.   Neurological: patient is alert and oriented to person, place, and time. patient has normal strength and normal reflexes. No sensory deficit. Gait normal.   Skin: Skin is warm, dry and intact. No bruising, no lesion and no rash noted. No cyanosis. Nails show no clubbing.   No  lesions   Psychiatric: patient has a normal mood and affect. patient speech is normal and behavior is normal. Judgment normal. Cognition and memory are normal.   Vitals reviewed.     Lab Results   Component Value Date    WBC 7.41 02/06/2025    HGB 11.4 (L) 02/06/2025    HCT 34.6 (L) 02/06/2025    MCV 88 02/06/2025     02/06/2025       CMP  Sodium   Date Value Ref Range Status   06/21/2024 139 136 - 145 mmol/L Final     Potassium   Date Value Ref Range Status   06/21/2024 3.8 3.5 - 5.1 mmol/L Final     Chloride   Date Value Ref Range Status   06/21/2024 106 95 - 110 mmol/L Final     CO2   Date Value Ref Range Status   06/21/2024 23 23 - 29 mmol/L Final     Glucose   Date Value Ref Range Status   06/21/2024 152 (H) 70 - 110 mg/dL Final     BUN   Date Value Ref Range Status   06/21/2024 16 6 - 20 mg/dL Final     Creatinine   Date Value Ref Range Status   06/21/2024 0.8 0.5 - 1.4 mg/dL Final     Calcium   Date Value Ref Range Status   06/21/2024 9.0 8.7 - 10.5 mg/dL Final     Total Protein   Date Value Ref Range Status   06/21/2024 6.1 6.0 - 8.4 g/dL Final     Albumin   Date Value Ref Range Status   06/21/2024 3.5 3.5 - 5.2 g/dL Final     Total Bilirubin   Date Value Ref Range Status   06/21/2024 0.4 0.1 - 1.0 mg/dL Final     Comment:     For infants and newborns, interpretation of results should be based  on gestational age, weight and in agreement with clinical  observations.    Premature Infant recommended reference ranges:  Up to 24 hours.............<8.0 mg/dL  Up to 48 hours............<12.0 mg/dL  3-5 days..................<15.0 mg/dL  6-29 days.................<15.0 mg/dL       Alkaline Phosphatase   Date Value Ref Range Status   06/21/2024 75 55 - 135 U/L Final     AST   Date Value Ref Range Status   06/21/2024 16 10 - 40 U/L Final     ALT   Date Value Ref Range Status   06/21/2024 12 10 - 44 U/L Final     Anion Gap   Date Value Ref Range Status   06/21/2024 10 8 - 16 mmol/L Final     eGFR   Date Value  Ref Range Status   06/21/2024 >60.0 >60 mL/min/1.73 m^2 Final         Assessment    IV iron deficiency secondary to history of gastric bypass surgery for weight lost reduction.    Check CBC CMP iron panel and ferritin today.    I do not see she had any history of colonoscopy for screening purposes.  She is age of 54 years old should get one    Anticipation of IV iron gluconate x(4/8) she can do 1 more treatment this Thursday.  Afterwards she can hold on any additional IV iron therapy.  I can check her iron studies in about 3 months.  Outside patient can do iron supplements either in pill or liquid form.  Again I will check her iron studies regardless.    Colonoscopy screening deferred to PCP.      Plan    There are no diagnoses linked to this encounter.

## 2025-02-10 NOTE — TELEPHONE ENCOUNTER
Follow up call placed to patient. No answer received. Left message requesting return call to office. Patient has not yet read My [facility] portal message sent earlier on today's date.

## 2025-02-10 NOTE — TELEPHONE ENCOUNTER
Please help her with a follow up appointment with me for a follow up   Earlier than later. Thanks.

## 2025-02-13 ENCOUNTER — TELEPHONE (OUTPATIENT)
Dept: FAMILY MEDICINE | Facility: CLINIC | Age: 55
End: 2025-02-13
Payer: MEDICAID

## 2025-02-18 ENCOUNTER — RESULTS FOLLOW-UP (OUTPATIENT)
Dept: FAMILY MEDICINE | Facility: CLINIC | Age: 55
End: 2025-02-18

## 2025-02-18 NOTE — LETTER
February 25, 2025    Meg Lockhart  [address]  [place] [address]             [place] [facility]  [address]  [place] [address]  Phone: [phone]  Fax: [phone] Dear Ms. Meg Lockhart:    Below are the results from your recent visit:    Resulted Orders   Microalbumin/creatinine urine ratio   Result Value Ref Range    Microalbumin, Urine 271.8 (H) <19.9 ug/mL    Creatinine, Urine 97.6 15.0 - 325.0 mg/dL      Comment:      The random urine reference ranges provided were established   for 24 hour urine collections.  No reference ranges exist for  random urine specimens.  Correlate clinically.      Microalb/Creat Ratio 278.5 (H) 0.0 - 30.0 ug/mg    Narrative    [facility] Bulk Order  Specimen Source->Urine   Hemoglobin A1c (Diabetes)   Result Value Ref Range    Hemoglobin A1C 10.0 (H) 4.5 - 6.2 %      Comment:      According to ADA guidelines, hemoglobin A1C <7.0% represents  optimal control in non-pregnant diabetic patients.  Different  metrics may apply to specific populations.   Standards of Medical Care in Diabetes - 2016.    For the purpose of screening for the presence of diabetes:  <5.7%     Consistent with the absence of diabetes  5.7-6.4%  Consistent with increasing risk for diabetes   (prediabetes)  >or=6.5%  Consistent with diabetes    Currently no consensus exists for use of hemoglobin A1C  for diagnosis of diabetes for children.      Estimated Avg Glucose 240 (H) 68 - 131 mg/dL    Narrative    [facility] Bulk Order     We have been trying to reach you by phone, your results show that your diabetes is improving, but Dr. Andres would like to see you for increased level of protein in your urine.    Sincerely,        Diana Walker MA

## 2025-02-21 NOTE — TELEPHONE ENCOUNTER
Call placed to patient. Ask her to call office and set up a appointment. Left message on voicemail.

## 2025-02-21 NOTE — TELEPHONE ENCOUNTER
----- Message from Meera Andres MD sent at 2/9/2025  9:27 AM CST -----  Please let her know that her diabetes is improving but I would like to see her for increased level of protein in her urine. Please help her with a follow up appt with me.

## 2025-05-06 ENCOUNTER — TELEPHONE (OUTPATIENT)
Facility: CLINIC | Age: 55
End: 2025-05-06
Payer: MEDICAID

## 2025-05-06 NOTE — TELEPHONE ENCOUNTER
LVM to confirm pt upcoming appt with  on 05/13/2025. Office number was left on  for any further questions or concerns.

## 2025-05-07 DIAGNOSIS — E11.9 TYPE 2 DIABETES MELLITUS WITHOUT COMPLICATION: ICD-10-CM

## 2025-05-09 ENCOUNTER — HOSPITAL ENCOUNTER (OUTPATIENT)
Facility: HOSPITAL | Age: 55
Discharge: HOME | End: 2025-05-11
Attending: STUDENT IN AN ORGANIZED HEALTH CARE EDUCATION/TRAINING PROGRAM | Admitting: INTERNAL MEDICINE
Payer: MEDICAID

## 2025-05-09 DIAGNOSIS — G45.9 TIA (TRANSIENT ISCHEMIC ATTACK): Primary | ICD-10-CM

## 2025-05-09 DIAGNOSIS — R07.9 CHEST PAIN: ICD-10-CM

## 2025-05-09 PROCEDURE — 93010 ELECTROCARDIOGRAM REPORT: CPT | Mod: ,,, | Performed by: GENERAL PRACTICE

## 2025-05-09 PROCEDURE — 96374 THER/PROPH/DIAG INJ IV PUSH: CPT

## 2025-05-09 PROCEDURE — 99285 EMERGENCY DEPT VISIT HI MDM: CPT | Mod: 25

## 2025-05-09 PROCEDURE — 96361 HYDRATE IV INFUSION ADD-ON: CPT

## 2025-05-09 PROCEDURE — 82962 GLUCOSE BLOOD TEST: CPT

## 2025-05-09 PROCEDURE — 93005 ELECTROCARDIOGRAM TRACING: CPT | Performed by: GENERAL PRACTICE

## 2025-05-09 NOTE — Clinical Note
Diagnosis: TIA (transient ischemic attack) [746459]   Future Attending Provider: LOBO LOPEZ [[id number]]   Place in Observation: [facility] [[id number]]   Special Needs:: No Special Needs [1]

## 2025-05-10 PROBLEM — G45.9 TIA (TRANSIENT ISCHEMIC ATTACK): Status: ACTIVE | Noted: 2025-05-10

## 2025-05-10 LAB
ABSOLUTE EOSINOPHIL (SMH): 0.06 K/UL
ABSOLUTE MONOCYTE (SMH): 0.5 K/UL (ref 0.3–1)
ABSOLUTE NEUTROPHIL COUNT (SMH): 5.7 K/UL (ref 1.8–7.7)
ALBUMIN SERPL-MCNC: 3.9 G/DL (ref 3.5–5.2)
ALP SERPL-CCNC: 100 UNIT/L (ref 55–135)
ALT SERPL-CCNC: 21 UNIT/L (ref 10–44)
ANION GAP (SMH): 10 MMOL/L (ref 8–16)
AST SERPL-CCNC: 14 UNIT/L (ref 10–40)
BASOPHILS # BLD AUTO: 0.05 K/UL
BASOPHILS NFR BLD AUTO: 0.6 %
BILIRUB SERPL-MCNC: 0.4 MG/DL (ref 0.1–1)
BNP SERPL-MCNC: 10 PG/ML
BUN SERPL-MCNC: 21 MG/DL (ref 6–20)
CALCIUM SERPL-MCNC: 9.8 MG/DL (ref 8.7–10.5)
CHLORIDE SERPL-SCNC: 103 MMOL/L (ref 95–110)
CO2 SERPL-SCNC: 26 MMOL/L (ref 23–29)
CREAT SERPL-MCNC: 1.2 MG/DL (ref 0.5–1.4)
EAG (SMH): 206 MG/DL (ref 68–131)
ERYTHROCYTE [DISTWIDTH] IN BLOOD BY AUTOMATED COUNT: 13.2 % (ref 11.5–14.5)
ETHANOL SERPL-MCNC: 11 MG/DL
GFR SERPLBLD CREATININE-BSD FMLA CKD-EPI: 54 ML/MIN/1.73/M2
GLUCOSE SERPL-MCNC: 163 MG/DL (ref 70–110)
HBA1C MFR BLD: 8.8 % (ref 4.5–6.2)
HCT VFR BLD AUTO: 35.7 % (ref 37–48.5)
HCV AB SERPL QL IA: NORMAL
HGB BLD-MCNC: 11.6 GM/DL (ref 12–16)
HIV 1+2 AB+HIV1 P24 AG SERPL QL IA: NORMAL
IMM GRANULOCYTES # BLD AUTO: 0.05 K/UL (ref 0–0.04)
IMM GRANULOCYTES NFR BLD AUTO: 0.6 % (ref 0–0.5)
LIPASE SERPL-CCNC: 98 U/L (ref 4–60)
LYMPHOCYTES # BLD AUTO: 1.95 K/UL (ref 1–4.8)
MAGNESIUM SERPL-MCNC: 2.5 MG/DL (ref 1.6–2.6)
MCH RBC QN AUTO: 29.5 PG (ref 27–31)
MCHC RBC AUTO-ENTMCNC: 32.5 G/DL (ref 32–36)
MCV RBC AUTO: 91 FL (ref 82–98)
NUCLEATED RBC (/100WBC) (SMH): 0 /100 WBC
OHS QRS DURATION: 82 MS
OHS QTC CALCULATION: 447 MS
PLATELET # BLD AUTO: 365 K/UL (ref 150–450)
PMV BLD AUTO: 10.3 FL (ref 9.2–12.9)
POCT GLUCOSE: 111 MG/DL (ref 70–110)
POCT GLUCOSE: 115 MG/DL (ref 70–110)
POCT GLUCOSE: 158 MG/DL (ref 70–110)
POCT GLUCOSE: 163 MG/DL (ref 70–110)
POCT GLUCOSE: 190 MG/DL (ref 70–110)
POTASSIUM SERPL-SCNC: 3.8 MMOL/L (ref 3.5–5.1)
PROT SERPL-MCNC: 7.5 GM/DL (ref 6–8.4)
RBC # BLD AUTO: 3.93 M/UL (ref 4–5.4)
RELATIVE EOSINOPHIL (SMH): 0.7 % (ref 0–8)
RELATIVE LYMPHOCYTE (SMH): 23.4 % (ref 18–48)
RELATIVE MONOCYTE (SMH): 6 % (ref 4–15)
RELATIVE NEUTROPHIL (SMH): 68.7 % (ref 38–73)
SODIUM SERPL-SCNC: 139 MMOL/L (ref 136–145)
TROPONIN HIGH SENSITIVE (SMH): 2.9 PG/ML
TROPONIN HIGH SENSITIVE (SMH): 3.2 PG/ML
WBC # BLD AUTO: 8.33 K/UL (ref 3.9–12.7)

## 2025-05-10 PROCEDURE — 96376 TX/PRO/DX INJ SAME DRUG ADON: CPT

## 2025-05-10 PROCEDURE — 85025 COMPLETE CBC W/AUTO DIFF WBC: CPT | Performed by: STUDENT IN AN ORGANIZED HEALTH CARE EDUCATION/TRAINING PROGRAM

## 2025-05-10 PROCEDURE — 83735 ASSAY OF MAGNESIUM: CPT | Performed by: STUDENT IN AN ORGANIZED HEALTH CARE EDUCATION/TRAINING PROGRAM

## 2025-05-10 PROCEDURE — 63600175 PHARM REV CODE 636 W HCPCS

## 2025-05-10 PROCEDURE — G0378 HOSPITAL OBSERVATION PER HR: HCPCS

## 2025-05-10 PROCEDURE — 96375 TX/PRO/DX INJ NEW DRUG ADDON: CPT

## 2025-05-10 PROCEDURE — 63600175 PHARM REV CODE 636 W HCPCS: Performed by: INTERNAL MEDICINE

## 2025-05-10 PROCEDURE — 25000003 PHARM REV CODE 250: Performed by: INTERNAL MEDICINE

## 2025-05-10 PROCEDURE — 36415 COLL VENOUS BLD VENIPUNCTURE: CPT | Performed by: INTERNAL MEDICINE

## 2025-05-10 PROCEDURE — 84484 ASSAY OF TROPONIN QUANT: CPT | Performed by: STUDENT IN AN ORGANIZED HEALTH CARE EDUCATION/TRAINING PROGRAM

## 2025-05-10 PROCEDURE — 83880 ASSAY OF NATRIURETIC PEPTIDE: CPT | Performed by: STUDENT IN AN ORGANIZED HEALTH CARE EDUCATION/TRAINING PROGRAM

## 2025-05-10 PROCEDURE — 87389 HIV-1 AG W/HIV-1&-2 AB AG IA: CPT | Performed by: EMERGENCY MEDICINE

## 2025-05-10 PROCEDURE — 84460 ALANINE AMINO (ALT) (SGPT): CPT | Performed by: STUDENT IN AN ORGANIZED HEALTH CARE EDUCATION/TRAINING PROGRAM

## 2025-05-10 PROCEDURE — 96361 HYDRATE IV INFUSION ADD-ON: CPT

## 2025-05-10 PROCEDURE — 25000003 PHARM REV CODE 250

## 2025-05-10 PROCEDURE — 25000003 PHARM REV CODE 250: Performed by: STUDENT IN AN ORGANIZED HEALTH CARE EDUCATION/TRAINING PROGRAM

## 2025-05-10 PROCEDURE — 82077 ASSAY SPEC XCP UR&BREATH IA: CPT | Performed by: STUDENT IN AN ORGANIZED HEALTH CARE EDUCATION/TRAINING PROGRAM

## 2025-05-10 PROCEDURE — 96372 THER/PROPH/DIAG INJ SC/IM: CPT | Performed by: INTERNAL MEDICINE

## 2025-05-10 PROCEDURE — 83036 HEMOGLOBIN GLYCOSYLATED A1C: CPT | Performed by: INTERNAL MEDICINE

## 2025-05-10 PROCEDURE — 92523 SPEECH SOUND LANG COMPREHEN: CPT

## 2025-05-10 PROCEDURE — 86803 HEPATITIS C AB TEST: CPT | Performed by: EMERGENCY MEDICINE

## 2025-05-10 PROCEDURE — 83690 ASSAY OF LIPASE: CPT | Performed by: STUDENT IN AN ORGANIZED HEALTH CARE EDUCATION/TRAINING PROGRAM

## 2025-05-10 RX ORDER — LANOLIN ALCOHOL/MO/W.PET/CERES
800 CREAM (GRAM) TOPICAL
Status: DISCONTINUED | OUTPATIENT
Start: 2025-05-10 | End: 2025-05-11 | Stop reason: HOSPADM

## 2025-05-10 RX ORDER — INSULIN GLARGINE 100 [IU]/ML
20 INJECTION, SOLUTION SUBCUTANEOUS DAILY
Status: DISCONTINUED | OUTPATIENT
Start: 2025-05-10 | End: 2025-05-11 | Stop reason: HOSPADM

## 2025-05-10 RX ORDER — MECLIZINE HYDROCHLORIDE 25 MG/1
25 TABLET ORAL EVERY 6 HOURS
Status: COMPLETED | OUTPATIENT
Start: 2025-05-10 | End: 2025-05-11

## 2025-05-10 RX ORDER — ACETAMINOPHEN 325 MG/1
650 TABLET ORAL EVERY 4 HOURS PRN
Status: DISCONTINUED | OUTPATIENT
Start: 2025-05-10 | End: 2025-05-11 | Stop reason: HOSPADM

## 2025-05-10 RX ORDER — PREGABALIN 50 MG/1
50 CAPSULE ORAL 3 TIMES DAILY
COMMUNITY
Start: 2025-05-06

## 2025-05-10 RX ORDER — AMLODIPINE BESYLATE 5 MG/1
5 TABLET ORAL DAILY
COMMUNITY

## 2025-05-10 RX ORDER — ATORVASTATIN CALCIUM 40 MG/1
80 TABLET, FILM COATED ORAL DAILY
Status: DISCONTINUED | OUTPATIENT
Start: 2025-05-10 | End: 2025-05-10

## 2025-05-10 RX ORDER — ONDANSETRON HYDROCHLORIDE 2 MG/ML
4 INJECTION, SOLUTION INTRAVENOUS
Status: ACTIVE | OUTPATIENT
Start: 2025-05-10 | End: 2025-05-10

## 2025-05-10 RX ORDER — SODIUM,POTASSIUM PHOSPHATES 280-250MG
2 POWDER IN PACKET (EA) ORAL
Status: DISCONTINUED | OUTPATIENT
Start: 2025-05-10 | End: 2025-05-11 | Stop reason: HOSPADM

## 2025-05-10 RX ORDER — IBUPROFEN 200 MG
24 TABLET ORAL
Status: DISCONTINUED | OUTPATIENT
Start: 2025-05-10 | End: 2025-05-11 | Stop reason: HOSPADM

## 2025-05-10 RX ORDER — MECLIZINE HYDROCHLORIDE 25 MG/1
25 TABLET ORAL 3 TIMES DAILY PRN
Status: DISCONTINUED | OUTPATIENT
Start: 2025-05-10 | End: 2025-05-10

## 2025-05-10 RX ORDER — LORAZEPAM 2 MG/ML
1 INJECTION INTRAMUSCULAR
Status: DISCONTINUED | OUTPATIENT
Start: 2025-05-10 | End: 2025-05-11 | Stop reason: HOSPADM

## 2025-05-10 RX ORDER — IBUPROFEN 200 MG
16 TABLET ORAL
Status: DISCONTINUED | OUTPATIENT
Start: 2025-05-10 | End: 2025-05-11 | Stop reason: HOSPADM

## 2025-05-10 RX ORDER — LABETALOL HYDROCHLORIDE 5 MG/ML
10 INJECTION, SOLUTION INTRAVENOUS
Status: DISCONTINUED | OUTPATIENT
Start: 2025-05-10 | End: 2025-05-11 | Stop reason: HOSPADM

## 2025-05-10 RX ORDER — ACETAMINOPHEN 325 MG/1
650 TABLET ORAL EVERY 8 HOURS PRN
Status: DISCONTINUED | OUTPATIENT
Start: 2025-05-10 | End: 2025-05-10

## 2025-05-10 RX ORDER — AMOXICILLIN 250 MG
1 CAPSULE ORAL 2 TIMES DAILY
Status: DISCONTINUED | OUTPATIENT
Start: 2025-05-10 | End: 2025-05-11 | Stop reason: HOSPADM

## 2025-05-10 RX ORDER — TALC
6 POWDER (GRAM) TOPICAL NIGHTLY PRN
Status: DISCONTINUED | OUTPATIENT
Start: 2025-05-10 | End: 2025-05-11 | Stop reason: HOSPADM

## 2025-05-10 RX ORDER — INSULIN ASPART 100 [IU]/ML
0-10 INJECTION, SOLUTION INTRAVENOUS; SUBCUTANEOUS
Status: DISCONTINUED | OUTPATIENT
Start: 2025-05-10 | End: 2025-05-11 | Stop reason: HOSPADM

## 2025-05-10 RX ORDER — GABAPENTIN 600 MG/1
600 TABLET ORAL 3 TIMES DAILY
COMMUNITY
Start: 2025-02-10 | End: 2025-05-10 | Stop reason: ALTCHOICE

## 2025-05-10 RX ORDER — ATORVASTATIN CALCIUM 40 MG/1
80 TABLET, FILM COATED ORAL NIGHTLY
Status: DISCONTINUED | OUTPATIENT
Start: 2025-05-11 | End: 2025-05-11 | Stop reason: HOSPADM

## 2025-05-10 RX ORDER — SODIUM CHLORIDE 0.9 % (FLUSH) 0.9 %
3 SYRINGE (ML) INJECTION EVERY 12 HOURS PRN
Status: DISCONTINUED | OUTPATIENT
Start: 2025-05-10 | End: 2025-05-11 | Stop reason: HOSPADM

## 2025-05-10 RX ORDER — GLUCAGON 1 MG
1 KIT INJECTION
Status: DISCONTINUED | OUTPATIENT
Start: 2025-05-10 | End: 2025-05-11 | Stop reason: HOSPADM

## 2025-05-10 RX ORDER — ALUMINUM HYDROXIDE, MAGNESIUM HYDROXIDE, AND SIMETHICONE 1200; 120; 1200 MG/30ML; MG/30ML; MG/30ML
30 SUSPENSION ORAL 4 TIMES DAILY PRN
Status: DISCONTINUED | OUTPATIENT
Start: 2025-05-10 | End: 2025-05-11 | Stop reason: HOSPADM

## 2025-05-10 RX ORDER — SODIUM CHLORIDE, SODIUM LACTATE, POTASSIUM CHLORIDE, CALCIUM CHLORIDE 600; 310; 30; 20 MG/100ML; MG/100ML; MG/100ML; MG/100ML
INJECTION, SOLUTION INTRAVENOUS CONTINUOUS
Status: ACTIVE | OUTPATIENT
Start: 2025-05-10 | End: 2025-05-10

## 2025-05-10 RX ORDER — CIPROFLOXACIN 750 MG/1
750 TABLET, FILM COATED ORAL EVERY 12 HOURS
COMMUNITY
Start: 2025-05-06

## 2025-05-10 RX ORDER — ONDANSETRON HYDROCHLORIDE 2 MG/ML
4 INJECTION, SOLUTION INTRAVENOUS EVERY 6 HOURS PRN
Status: DISCONTINUED | OUTPATIENT
Start: 2025-05-10 | End: 2025-05-11 | Stop reason: HOSPADM

## 2025-05-10 RX ORDER — SODIUM CHLORIDE 0.9 % (FLUSH) 0.9 %
10 SYRINGE (ML) INJECTION
Status: DISCONTINUED | OUTPATIENT
Start: 2025-05-10 | End: 2025-05-11 | Stop reason: HOSPADM

## 2025-05-10 RX ORDER — FAMOTIDINE 10 MG/ML
20 INJECTION, SOLUTION INTRAVENOUS 2 TIMES DAILY
Status: DISCONTINUED | OUTPATIENT
Start: 2025-05-10 | End: 2025-05-11 | Stop reason: HOSPADM

## 2025-05-10 RX ORDER — ASPIRIN 325 MG
325 TABLET, DELAYED RELEASE (ENTERIC COATED) ORAL DAILY
Status: DISCONTINUED | OUTPATIENT
Start: 2025-05-10 | End: 2025-05-11 | Stop reason: HOSPADM

## 2025-05-10 RX ORDER — ERGOCALCIFEROL 1.25 MG/1
50000 CAPSULE ORAL
COMMUNITY
Start: 2025-01-06

## 2025-05-10 RX ORDER — EMPAGLIFLOZIN 25 MG/1
25 TABLET, FILM COATED ORAL DAILY
COMMUNITY

## 2025-05-10 RX ORDER — ASPIRIN 325 MG
325 TABLET ORAL
Status: COMPLETED | OUTPATIENT
Start: 2025-05-10 | End: 2025-05-10

## 2025-05-10 RX ORDER — ATORVASTATIN CALCIUM 40 MG/1
40 TABLET, FILM COATED ORAL DAILY
Status: DISCONTINUED | OUTPATIENT
Start: 2025-05-10 | End: 2025-05-10

## 2025-05-10 RX ORDER — MECLIZINE HYDROCHLORIDE 25 MG/1
25 TABLET ORAL 3 TIMES DAILY PRN
Status: DISCONTINUED | OUTPATIENT
Start: 2025-05-11 | End: 2025-05-11 | Stop reason: HOSPADM

## 2025-05-10 RX ORDER — NALOXONE HCL 0.4 MG/ML
0.02 VIAL (ML) INJECTION
Status: DISCONTINUED | OUTPATIENT
Start: 2025-05-10 | End: 2025-05-11 | Stop reason: HOSPADM

## 2025-05-10 RX ORDER — BISACODYL 10 MG/1
10 SUPPOSITORY RECTAL DAILY PRN
Status: DISCONTINUED | OUTPATIENT
Start: 2025-05-10 | End: 2025-05-11 | Stop reason: HOSPADM

## 2025-05-10 RX ORDER — LORAZEPAM 1 MG/1
1 TABLET ORAL DAILY PRN
Status: DISCONTINUED | OUTPATIENT
Start: 2025-05-10 | End: 2025-05-11 | Stop reason: HOSPADM

## 2025-05-10 RX ORDER — SEMAGLUTIDE 2.68 MG/ML
2 INJECTION, SOLUTION SUBCUTANEOUS
COMMUNITY
Start: 2025-02-10

## 2025-05-10 RX ADMIN — MECLIZINE HYDROCHLORIDE 25 MG: 25 TABLET ORAL at 05:05

## 2025-05-10 RX ADMIN — INSULIN ASPART 1 UNITS: 100 INJECTION, SOLUTION INTRAVENOUS; SUBCUTANEOUS at 07:05

## 2025-05-10 RX ADMIN — ACETAMINOPHEN 650 MG: 325 TABLET ORAL at 08:05

## 2025-05-10 RX ADMIN — ONDANSETRON 4 MG: 2 INJECTION INTRAMUSCULAR; INTRAVENOUS at 08:05

## 2025-05-10 RX ADMIN — FAMOTIDINE 20 MG: 10 INJECTION, SOLUTION INTRAVENOUS at 07:05

## 2025-05-10 RX ADMIN — ASPIRIN 325 MG ORAL TABLET 325 MG: 325 PILL ORAL at 06:05

## 2025-05-10 RX ADMIN — LORAZEPAM 1 MG: 2 INJECTION INTRAMUSCULAR; INTRAVENOUS at 03:05

## 2025-05-10 RX ADMIN — SODIUM CHLORIDE, POTASSIUM CHLORIDE, SODIUM LACTATE AND CALCIUM CHLORIDE: 600; 310; 30; 20 INJECTION, SOLUTION INTRAVENOUS at 08:05

## 2025-05-10 RX ADMIN — SODIUM CHLORIDE, POTASSIUM CHLORIDE, SODIUM LACTATE AND CALCIUM CHLORIDE: 600; 310; 30; 20 INJECTION, SOLUTION INTRAVENOUS at 10:05

## 2025-05-10 RX ADMIN — MECLIZINE HYDROCHLORIDE 25 MG: 25 TABLET ORAL at 12:05

## 2025-05-10 RX ADMIN — SENNOSIDES AND DOCUSATE SODIUM 1 TABLET: 50; 8.6 TABLET ORAL at 07:05

## 2025-05-10 RX ADMIN — INSULIN GLARGINE 20 UNITS: 100 INJECTION, SOLUTION SUBCUTANEOUS at 08:05

## 2025-05-10 RX ADMIN — INSULIN ASPART 2 UNITS: 100 INJECTION, SOLUTION INTRAVENOUS; SUBCUTANEOUS at 05:05

## 2025-05-10 RX ADMIN — FAMOTIDINE 20 MG: 10 INJECTION, SOLUTION INTRAVENOUS at 08:05

## 2025-05-10 RX ADMIN — ASPIRIN 325 MG: 325 TABLET, COATED ORAL at 08:05

## 2025-05-10 NOTE — ASSESSMENT & PLAN NOTE
All symptoms gone  But worrisome since its same symptoms she had last year with CVA      BP on lower side when I saw her     PLAN    - id like to get MRI and MRA brain this morning     - carotid US also     - had echo bubble study last year   No need for new one    - no need for CTA at this time since not looking for [place] area     - continue Aspirin    I did 325 mg for now  Continue her 80 mg lipitor po daily     - CVA Pathway was put in       - neurology consultation also       - reg diet    Pepcid IV BID      IVFs  also   seems dry on labs

## 2025-05-10 NOTE — PLAN OF CARE
Problem: Stroke, Ischemic (Includes Transient Ischemic Attack)  Goal: Optimal Coping  Outcome: Progressing  Goal: Optimal Cerebral Tissue Perfusion  Outcome: Progressing  Goal: Optimal Cognitive Function  Outcome: Progressing  Goal: Improved Communication Skills  Outcome: Progressing  Goal: Optimal Nutrition Intake  Outcome: Progressing  Goal: Effective Oxygenation and Ventilation  Outcome: Progressing  Goal: Safe and Effective Swallow  Outcome: Progressing  Goal: Effective Urinary Elimination  Outcome: Progressing     Problem: Fall Injury Risk  Goal: Absence of Fall and Fall-Related Injury  Outcome: Progressing     Problem: Infection  Goal: Absence of Infection Signs and Symptoms  Outcome: Progressing     Problem: Adult Inpatient Plan of Care  Goal: Plan of Care Review  Outcome: Progressing  Goal: Optimal Comfort and Wellbeing  Outcome: Progressing     Problem: Diabetes Comorbidity  Goal: Blood Glucose Level Within Targeted Range  Outcome: Progressing

## 2025-05-10 NOTE — PT/OT/SLP PROGRESS
Physical Therapy      Patient Name:  Meg Lockhart   MRN:  [id number]    Patient not seen today secondary to patient transferred from [facility] ED 5 to [facility] 220. Will follow-up 5/10/25.

## 2025-05-10 NOTE — PT/OT/SLP EVAL
"Speech Language Pathology Evaluation  Cognitive/Bedside Swallow    Patient Name:  Meg Lockhart   MRN:  [id number]  Admitting Diagnosis: TIA (transient ischemic attack)    Recommendations:                  General Recommendations:  Follow-up not indicated  Diet recommendations:  Regular Diet - IDDSI Level 7, Thin liquids - IDDSI Level 0   Aspiration Precautions: Standard aspiration precautions   General Precautions: Standard,    Communication strategies:  none    Assessment:     Meg Lockhart is a 55 y.o. female with an admitting diagnosis of TIA (transient ischemic attack).  She presents with cognitive-linguistic status WFL. No overt s/s aspiration or oropharyngeal dysphagia reported by pt or family; rec regular diet w/ thin liquids. No speech, language, or cognitive deficits noted upon screening; no changes reported by pt or family. Pt did politely decline formal swallow and cognitive-linguistic evaluations 2/2 being at baseline. No further ST needs at this time. Please re-consult if changes in swallowing, speech, voice, or cognitive-linguistic changes occur.     History:   Per H&P:  "HPI: 55 y.o. female with a past medical history of a previous cerebellar CVA, type 2 diabetes, anemia, and previous bariatric surgery that presents to the ED for multiple complaints.  Patient states that she had a previous stroke in June which consisted of her having vertigo, loss of balance in her lower extremity, and burning sensation in her lower extremities.  Patient had an episode tonight in which she was in the bathroom and she felt like her whole-body locked up.  She felt vertigo and like she was unable to control her head.  She did hit her head against the wall.  She also complained of some chest discomfort which has mostly resolved.  She had episodes of nausea and vomiting and felt like her abdomen was punched.  Her abdominal discomfort and chest pain have subsided, but she continues to complain of mild nausea.  All " "of her symptoms have resolved.  Entire episode lasted a proximally 40 minutes.  Not complaining of any unilateral weakness, vision changes, numbness, shortness of breath, or fevers.  No URI symptoms.     Non smoker  Non drinker        She had CVA 2024 with some residual right foot drop only         In the last 4 days was getting very similar symptoms listed above , which were what she had with her CVA in 2024       Including headache.    Headache has been on and off and its mostly left side head .          Dizzy , nausea, weakness, headache   are all happening a few times in last 4 days           So that's why she came to our ER         IN OUR ER        Labs normal      CT head NEG     No CTA done            All symptoms gone when I saw her in ER      She felt fine asked about going home        But I explained that we wanted her to stay for more testing since these symptoms remind her of the stroke she just had last year   And she agreed"    Past Medical History:   Diagnosis Date    Anxiety     Diabetes mellitus, type 2     Elevated cholesterol     GERD (gastroesophageal reflux disease)     Hypertension     PUD (peptic ulcer disease)        Past Surgical History:   Procedure Laterality Date    GASTRIC BYPASS      HYSTERECTOMY      KNEE SURGERY Left        Social History: Patient lives with family.    Prior Intubation HX:  none this admit    Modified Barium Swallow: none found in epic or reported by pt    Imaging:   Imaging Results              US Carotid Bilateral (Final result)  Result time 05/10/25 07:28:55      Final result by Germán Ricks DO (05/10/25 07:28:55)                   Impression:      Calcified plaque of the bilateral carotid bulbs proximal right ICA.  No hemodynamic significant stenosis.      Electronically signed by: Germán Ricks  Date:    05/10/2025  Time:    07:28               Narrative:      CMS MANDATED QUALITY DATA - CAROTID - 195    All measurements and percent stenosis described below " were determined using NASCET criteria or criteria similar to NASCET, as defined by the Society of Radiologists in Ultrasound Consensus Conference, Radiology, 2003    EXAMINATION:  US CAROTID BILATERAL    CLINICAL HISTORY:  TIA;    COMPARISON:  None    FINDINGS:  Color Doppler, spectral Doppler, and grayscale analysis was performed.    Grayscale images show calcified plaque formation of the bilateral carotid bulbs and proximal right ICA.    Right internal carotid artery estimated peak systolic velocities are 69.0 cm/sec proximally, 70.7 cm/sec mid, the 76.9 cm/sec distally.    Antegrade flow in right vertebral artery.    Left internal carotid artery estimated peak systolic velocities or 59.8 cm/sec proximally, 67.6 cm/sec mid,  65.7 cm/sec distally.    Antegrade flow in left vertebral artery.                                       CT Head Without Contrast (Final result)  Result time 05/10/25 01:23:55      Final result by Halley Mckeon MD (05/10/25 01:23:55)                   Impression:      No acute findings      Electronically signed by: Halley Mckeon  Date:    05/10/2025  Time:    01:23               Narrative:    EXAMINATION:  CT HEAD WITHOUT CONTRAST    CLINICAL HISTORY:  Transient ischemic attack (TIA);    TECHNIQUE:  Low dose axial images were obtained through the head.  Coronal and sagittal reformations were also performed. Contrast was not administered.    COMPARISON:  06/18/2024    FINDINGS:  Intracranial compartment:    Ventricles and sulci are normal in size for age without evidence of hydrocephalus. No extra-axial blood or fluid collections.    The brain parenchyma appears normal. No parenchymal mass, hemorrhage, edema or major vascular distribution infarct.    Skull/extracranial contents (limited evaluation): No fracture. Mastoid air cells and paranasal sinuses are essentially clear.                                       X-Ray Chest AP Portable (Final result)  Result time 05/10/25 00:57:50  "     Final result by Halley Mckeon MD (05/10/25 00:57:50)                   Impression:      No acute findings      Electronically signed by: Halley Mckeon  Date:    05/10/2025  Time:    00:57               Narrative:    EXAMINATION:  XR CHEST AP PORTABLE    CLINICAL HISTORY:  Chest Pain;    TECHNIQUE:  Single frontal view of the chest was performed.    COMPARISON:  06/18/2024    FINDINGS:  Lungs are clear. No focal consolidation. No pleural effusion. No pneumothorax. Normal heart size.                                      Prior diet: Regular, thin at home and at time of CSE.    Occupation/hobbies/homemaking: deferred.    Subjective     Pt awake sitting upright in bed w/ daughters at bedside. Pt politely declining formal evaluation of swallowing and cognition; however, pt willing to discuss hx, current admit, and lack of change in ST areas of evaluation.  Patient goals: "The early bird gets the worm; I kept saying that to see if I was having a stroke again. I was trying to remember all the things they did last time I had a stroke, and I decided that it would just be better for me to come to the hospital to be safe"     Pain/Comfort:       Respiratory Status: Room air    Objective:     Cognitive Status:    Arousal/Alertness Appropriate response to stimuli  Attention No obvious deficits observed    Perseveration Not present  Orientation Person, Place, and Situation  Memory Immediate Recall recalled role of ST;, Delayed recalled ST name after 9 minute delay, recalled details of upcoming transfer to [facility], and new food item at [employer]';, and long term recall recalled details from neurology appointments and past CVA evals/recs  [employer] functional for safety  Safety awareness pt talked through symptoms and safety of admitting to hospital for further eval of CVA symptoms IND   Sequencing WFL, pt able to sequence steps of laundry and cooking     Receptive Language:   Comprehension: "   Questions Simple yes/no WFL  Complex yes/no WFL  Open ended questions WFL  Conversation WFL    Pragmatics:    Appropriate    Expressive Language:  Verbal:    Initiation WFL  Naming Confrontation WFL  Conversational speech WFL      Motor Speech:  WFL    Voice:   Adequate for age, sex, size.    Visual-Spatial:  Grossly intact; no obvious deficits noted. No obvious field cuts or neglect noted    Reading:   Did not test     Written Expression:   Did not test    Oral Musculature Evaluation   Pt deferred eval of swallowing, reporting no hx of dysphagia or s/s aspiration/oropharyngeal dysphagia since admitting symptoms    Bedside Swallow Eval:   Consistencies Assessed:  None; however, pt reports eating regular textures and thin liquids this AM w/o difficulty or s/s aspiration     Oral Phase:   Not formally assessed    Pharyngeal Phase:   Not formally assessed    Compensatory Strategies  Not formally assessed    Treatment: Pt and family educated re purpose of evaluation, role of SLP, results of evaluation, and recs. Pt politely declining formal swallow and cognitive-linguistic evaluations 2/2 pt back to baseline per pt and family. Pt and family not reporting swallowing, speech, voice, or cognitive-linguistic changes at time of evaluation. SLP informed pt and family of recs for SLP re-consult if changes in swallowing, speech, voice, or cognitive-linguistic changes occur. Pt and family expressed understanding of recs.     Goals:   Multidisciplinary Problems       SLP Goals       Not on file                    Plan:     Patient to be seen:      Plan of Care expires:     Plan of Care reviewed with:  patient, daughter   SLP Follow-Up:  No       Discharge recommendations:  Therapy Intensity Recommendations at Discharge: No Therapy Indicated   Barriers to Discharge:  None    Time Tracking:     SLP Treatment Date:   05/10/25  Speech Start Time:  0949  Speech Stop Time:  1001     Speech Total Time (min):  12 min    Billable  Minutes: Eval speech/cognitive-linguistic 12 min     05/10/2025

## 2025-05-10 NOTE — HPI
55 y.o. female with a past medical history of a previous cerebellar CVA, type 2 diabetes, anemia, and previous bariatric surgery that presents to the ED for multiple complaints.  Patient states that she had a previous stroke in June which consisted of her having vertigo, loss of balance in her lower extremity, and burning sensation in her lower extremities.  Patient had an episode tonight in which she was in the bathroom and she felt like her whole-body locked up.  She felt vertigo and like she was unable to control her head.  She did hit her head against the wall.  She also complained of some chest discomfort which has mostly resolved.  She had episodes of nausea and vomiting and felt like her abdomen was punched.  Her abdominal discomfort and chest pain have subsided, but she continues to complain of mild nausea.  All of her symptoms have resolved.  Entire episode lasted a proximally 40 minutes.  Not complaining of any unilateral weakness, vision changes, numbness, shortness of breath, or fevers.  No URI symptoms.    Non smoker  Non drinker      She had CVA 2024 with some residual right foot drop only       In the last 4 days was getting very similar symptoms listed above , which were what she had with her CVA in 2024      Including headache.    Headache has been on and off and its mostly left side head .        Dizzy , nausea, weakness, headache   are all happening a few times in last 4 days         So that's why she came to our ER       IN OUR ER      Labs normal     CT head NEG    No CTA done         All symptoms gone when I saw her in ER     She felt fine asked about going home      But I explained that we wanted her to stay for more testing since these symptoms remind her of the stroke she just had last year   And she agreed

## 2025-05-10 NOTE — PT/OT/SLP PROGRESS
Occupational Therapy      Patient Name:  Meg Lockhart   MRN:  [id number]    OT order received while pt was in [facility] ED; pt transferred to [facility] prior to OT attempt to see patient today.       5/10/2025

## 2025-05-10 NOTE — ASSESSMENT & PLAN NOTE
She says her latest A1C was better down to 8 range       Lab Results   Component Value Date    HGBA1C 10.0 (H) 02/06/2025     PLAN    - I did basal insulin 20 units SQ Am     And moderate slide scale ACHS    Reg diet

## 2025-05-10 NOTE — PLAN OF CARE
completed discharge assessment with pt at bedside. Pt AAOx4s. Pt lives her  and children. Demographics, PCP, and insurance verified. No home health. No dialysis. Pt completes ADLs with some assistance when she is bathing. Pt to discharge home via family transport. Pt has no other needs to be addressed at this time.

## 2025-05-10 NOTE — ASSESSMENT & PLAN NOTE
All symptoms gone  Concern at its same symptoms she had last year with CVA  Permissive hypertension for 24 hours    PLAN  -CVA pathway  -Carotid ultrasound reviewed  -MRI/MRA brain unremarkable for acute finding  -echocardiogram reviewed  -tele neuro consulted  -received aspirin 325 NAD continue with aspirin 81 mg daily  -high-dose atorvastatin 80 mg daily    Tele Neuro recs:   MRI and MRA brain  Meclizine 25 mg q8h scheduled for 3 doses then prn  If vertigo presists- valium 2 mg po q8h scheduled for 3 doses then prn  Aspirin and lipitor

## 2025-05-10 NOTE — TELEMEDICINE CONSULT
"[facility]   General Neurology  Consult Note      Consult Information  Inpatient Consult to Neurology Services (General Neurology)  Consult performed by: Stefania Padron MD  Consult ordered by: Giovanni Kaur MD          Consulting Provider:    Current Providers  No providers found    Patient Location:  [facility] * IP Unit    Spoke hospital nurse at bedside with patient assisting consultant.  Patient information was obtained from patient.       Neurology Documentation       Blood pressure (!) 154/88, pulse 89, temperature 97.8 °F (36.6 °C), temperature source Oral, resp. rate 18, height 5' 5" (1.651 m), weight 84.6 kg (186 lb 8.2 oz), SpO2 96%, not currently breastfeeding.    Medical Decision Making  HPI:  55 y.o. female with a past medical history of a previous cerebellar CVA, type 2 diabetes, anemia, and previous bariatric surgery that presents to the ED for multiple complaints.  Patient states that she had a previous stroke in June which consisted of her having vertigo, loss of balance in her lower extremity, and burning sensation in her lower extremities.  Patient had an episode tonight in which she was in the bathroom and she felt like her whole-body locked up.  She felt vertigo and like she was unable to control her head.  She did hit her head against the wall.  She also complained of some chest discomfort which has mostly resolved.  She had episodes of nausea and vomiting and felt like her abdomen was punched.  Her abdominal discomfort and chest pain have subsided, but she continues to complain of mild nausea.  All of her symptoms have resolved.  Entire episode lasted a proximally 40 minutes.  Not complaining of any unilateral weakness, vision changes, numbness, shortness of breath, or fevers.  No URI symptoms.      She had CVA 2024 with some residual right foot drop only         In the last few days was getting very similar symptoms listed above , which were what she had with her " CVA in 2024     Currently still dizzy with head movements and room spinning. Has not eaten much or drank much given she is on ozempic     Images personally reviewed and interpreted:  Study: Head CT  Study Interpretation: neg    Additional studies reviewed:   Study: Cardiac_Neuro: none  Study Interpretation: N/A    Laboratory studies reviewed:  BMP:   Lab Results   Component Value Date     05/10/2025    K 3.8 05/10/2025     05/10/2025    CO2 26 05/10/2025    BUN 21 (H) 05/10/2025    CREATININE 1.2 05/10/2025    CALCIUM 9.8 05/10/2025     CBC:   Lab Results   Component Value Date    WBC 8.33 05/10/2025    RBC 3.93 (L) 05/10/2025    HGB 11.6 (L) 05/10/2025    HCT 35.7 (L) 05/10/2025     05/10/2025    MCV 91 05/10/2025    MCH 29.5 05/10/2025    MCHC 32.5 05/10/2025     Lipid Panel:   Lab Results   Component Value Date    CHOL 140 06/18/2024    LDLCALC 55.0 (L) 06/18/2024    HDL 54 06/18/2024    TRIG 155 (H) 06/18/2024     Coagulation:   Lab Results   Component Value Date    INR 0.9 06/19/2024    APTT <21.0 06/19/2024     Hgb A1C:   Lab Results   Component Value Date    HGBA1C 8.8 (H) 05/10/2025     TSH:   Lab Results   Component Value Date    TSH 2.633 06/18/2024       Documentation personally reviewed:  Notes: Notes: ED notes and H&P     Assessment and plan:  55 y.o. female with a past medical history of a previous cerebellar CVA, type 2 diabetes, anemia, and previous bariatric surgery that presents to the ED for multiple complaints.  Patient states that she had a previous stroke in June which consisted of her having vertigo, loss of balance in her lower extremity, and burning sensation in her lower extremities.  Patient had an episode tonight in which she was in the bathroom and she felt like her whole-body locked up.  She felt vertigo and like she was unable to control her head.  She did hit her head against the wall.  She also complained of some chest discomfort which has mostly resolved.  She had  episodes of nausea and vomiting and felt like her abdomen was punched.  Her abdominal discomfort and chest pain have subsided, but she continues to complain of mild nausea.  All of her symptoms have resolved.  Entire episode lasted a proximally 40 minutes.  Not complaining of any unilateral weakness, vision changes, numbness, shortness of breath, or fevers.  No URI symptoms.    MRI and MRA brain  Meclizine 25 mg q8h scheduled for 3 doses then prn  If vertigo presists- valium 2 mg po q8h scheduled for 3 doses then prn  Aspirin and lipitor  ECHO  Vascular risk factor optimization    Please recall neurology if MRI is abnormal    Post charge discharge plan:  Clinic follow up: General Neurology    Visit Type: in-person only  Timeframe: 4 weeks        Additional Physical Exam, History, & ROS  Review of Systems   Neurological:  Positive for dizziness. Negative for sensory change, speech change, focal weakness, seizures, loss of consciousness and weakness.     Physical Exam  Neurological:      Mental Status: She is alert and oriented to person, place, and time.      Cranial Nerves: No dysarthria or facial asymmetry.      Motor: No weakness or tremor.      Coordination: Finger-Nose-Finger Test normal.      Comments: ERLINDA         Past Medical History:   Diagnosis Date    Anxiety     Diabetes mellitus, type 2     Elevated cholesterol     GERD (gastroesophageal reflux disease)     Hypertension     PUD (peptic ulcer disease)      Past Surgical History:   Procedure Laterality Date    GASTRIC BYPASS      HYSTERECTOMY      KNEE SURGERY Left      Family History   Problem Relation Name Age of Onset    Heart disease Mother 39     Hypertension Mother 39     Heart attack Mother 39     Stroke Brother 28     Cancer Maternal Aunt      Cancer Maternal Grandmother          lung    Heart disease Maternal Grandmother      Diabetes Paternal Grandmother      Heart disease Paternal Grandmother      Diabetes Paternal Grandfather          Diagnoses  No problems updated.    Stefania Padron MD    Neurology consultation requested by spoke provider. Audiovisual encounter with the patient performed using a secure connection.  Results and impressions from the visit are documented on this note and were communicated to the consulting provider/team via direct communication. The note has been shared for addition to the patients electronic medical record.

## 2025-05-10 NOTE — PLAN OF CARE
[facility] - Emergency Dept  Initial Discharge Assessment      completed discharge assessment with pt at bedside. Pt AAOx4s. Pt lives her  and children. Demographics, PCP, and insurance verified. No home health. No dialysis. Pt completes ADLs with some assistance when she is bathing. Pt to discharge home via family transport. Pt has no other needs to be addressed at this time.   Primary Care Provider: Meera Andres MD    Admission Diagnosis: TIA (transient ischemic attack) [G45.9]    Admission Date: 5/9/2025  Expected Discharge Date:     Transition of Care Barriers: None    Payor: MEDICAID / Plan: [employer] / Product Type: Managed Medicaid /     Extended Emergency Contact Information  Primary Emergency Contact: Junior Lockhart  Address: [address] [place] [address]            [place] [address] [facility]  Home Phone: [phone]  Relation: Spouse   needed? No    Discharge Plan A: Home with family  Discharge Plan B: Home with family      [id number] [place] - [address]  [address] [facility] [place] [address]  Phone: [phone] Fax: [phone]      Initial Assessment (most recent)       Adult Discharge Assessment - 05/10/25 0939          Discharge Assessment    Assessment Type Discharge Planning Assessment     Confirmed/corrected address, phone number and insurance Yes     Confirmed Demographics Correct on Facesheet     Source of Information patient     When was your last doctors appointment? 04/29/25     Does patient/caregiver understand observation status Yes     Reason For Admission TIA (transient ischemic attack)     People in Home child(andreia), adult     Do you expect to return to your current living situation? Yes     Do you have help at home or someone to help you manage your care at home? Yes     Who are your caregiver(s) and their phone number(s)? Junior Lockhart, [phone] and children in the home of pt     Prior to hospitilization  cognitive status: Unable to Assess     Current cognitive status: Alert/Oriented     Walking or Climbing Stairs Difficulty yes     Walking or Climbing Stairs stair climbing difficulty, dependent     Mobility Management Pt endorsed that she drags one of her feet. Pt endorsed that when she is at the store that she needs wheelchir assitance     Dressing/Bathing Difficulty yes     Dressing/Bathing bathing difficulty, assistance 1 person     Dressing/Bathing Management pt endorsed that she needs help getting out of the bathtub     Do you have any problems with: Errands/Grocery     Home Accessibility wheelchair accessible     Home Layout Bathroom on 2nd floor;Bedroom on 2nd floor     Equipment Currently Used at Home wheelchair     Readmission within 30 days? No     Patient currently being followed by outpatient case management? No     Do you currently have service(s) that help you manage your care at home? No     Do you take prescription medications? Yes     Do you have prescription coverage? Yes     Coverage MEDICAID - [employer]     Do you have any problems affording any of your prescribed medications? No     Is the patient taking medications as prescribed? yes     Who is going to help you get home at discharge? pt endorsed her children     How do you get to doctors appointments? car, drives self;family or friend will provide     Are you on dialysis? No     Do you take coumadin? No     Discharge Plan A Home with family     Discharge Plan B Home with family     DME Needed Upon Discharge  none     Discharge Plan discussed with: Patient     Transition of Care Barriers None     SDOH --   pt denies

## 2025-05-10 NOTE — SUBJECTIVE & OBJECTIVE
Past Medical History:   Diagnosis Date    Anxiety     Diabetes mellitus, type 2     Elevated cholesterol     GERD (gastroesophageal reflux disease)     Hypertension     PUD (peptic ulcer disease)        Past Surgical History:   Procedure Laterality Date    GASTRIC BYPASS      HYSTERECTOMY      KNEE SURGERY Left        Review of patient's allergies indicates:   Allergen Reactions    Penicillins Itching and Anaphylaxis    Sulfa (sulfonamide antibiotics) Anaphylaxis    Meperidine Itching       No current facility-administered medications on file prior to encounter.     Current Outpatient Medications on File Prior to Encounter   Medication Sig    aspirin (ECOTRIN) 81 MG EC tablet Take 1 tablet (81 mg total) by mouth once daily.    atorvastatin (LIPITOR) 80 MG tablet Take 1 tablet (80 mg total) by mouth once daily.    blood sugar diagnostic Strp To check BG 4 times daily, to use with insurance preferred meter    blood-glucose meter (ACCU-CHEK GUIDE GLUCOSE METER) Mis use to check BG 4 times a day    chlorthalidone (HYGROTEN) 25 MG Tab Take 1 tablet (25 mg total) by mouth once daily.    famotidine (PEPCID) 40 MG tablet Take 1 tablet (40 mg total) by mouth once daily.    furosemide (LASIX) 20 MG tablet daily as needed.    insulin glargine U-100, Lantus, (BASAGLAR KWIKPEN U-100 INSULIN) 100 unit/mL (3 mL) InPn pen Inject 20 Units into the skin once daily. (Patient taking differently: Inject 9 Units into the skin 2 (two) times a day.)    insulin lispro 200 unit/mL (3 mL) InPn Inject 6 Units into the skin 3 (three) times daily. (Patient not taking: Reported on 7/12/2024)    lancets Misc Use to check BG four times daily    LORazepam (ATIVAN) 1 MG tablet Take 1 mg by mouth daily as needed for Anxiety.    meclizine HCl (MECLIZINE ORAL) Take by mouth daily as needed.    metFORMIN (FORTAMET) 1,000 mg 24hr tablet Take 1,000 mg by mouth daily with breakfast. (Patient not taking: Reported on 2/10/2025)    OZEMPIC 0.25 mg or 0.5 mg  (2 mg/3 mL) pen injector every 7 days.    SITagliptin phosphate (JANUVIA) 25 MG Tab Take 1 tablet (25 mg total) by mouth once daily.     Family History       Problem Relation (Age of Onset)    Cancer Maternal Aunt, Maternal Grandmother    Diabetes Paternal Grandmother, Paternal Grandfather    Heart attack Mother    Heart disease Mother, Maternal Grandmother, Paternal Grandmother    Hypertension Mother    Stroke Brother          Tobacco Use    Smoking status: Never     Passive exposure: Never    Smokeless tobacco: Never   Substance and Sexual Activity    Alcohol use: No    Drug use: No    Sexual activity: Yes     Partners: Male     Review of Systems   All other systems reviewed and are negative.    Objective:     Vital Signs (Most Recent):  Temp: 97.7 °F (36.5 °C) (05/09/25 2228)  Pulse: 86 (05/10/25 0658)  Resp: 20 (05/10/25 0630)  BP: 119/75 (05/10/25 0658)  SpO2: 97 % (05/10/25 0658) Vital Signs (24h Range):  Temp:  [97.7 °F (36.5 °C)] 97.7 °F (36.5 °C)  Pulse:  [82-91] 86  Resp:  [18-20] 20  SpO2:  [96 %-100 %] 97 %  BP: (104-144)/(74-83) 119/75     Weight: 85.7 kg (189 lb)  Body mass index is 31.45 kg/m².     Physical Exam  Vitals and nursing note reviewed.   Constitutional:       Appearance: Normal appearance.   HENT:      Head: Normocephalic.      Nose: Nose normal.      Mouth/Throat:      Mouth: Mucous membranes are moist.   Eyes:      Extraocular Movements: Extraocular movements intact.      Pupils: Pupils are equal, round, and reactive to light.   Cardiovascular:      Rate and Rhythm: Normal rate.      Pulses: Normal pulses.      Heart sounds: Normal heart sounds.   Pulmonary:      Effort: Pulmonary effort is normal.      Breath sounds: Normal breath sounds.   Abdominal:      General: Abdomen is flat. Bowel sounds are normal.   Musculoskeletal:         General: Normal range of motion.      Cervical back: Normal range of motion.   Skin:     General: Skin is warm.      Capillary Refill: Capillary refill takes  "less than 2 seconds.   Neurological:      General: No focal deficit present.      Mental Status: She is alert and oriented to person, place, and time.   Psychiatric:         Behavior: Behavior normal.              CRANIAL NERVES     CN III, IV, VI   Pupils are equal, round, and reactive to light.       Significant Labs: All pertinent labs within the past 24 hours have been reviewed.  CBC:   Recent Labs   Lab 05/10/25  0011   WBC 8.33   HGB 11.6*   HCT 35.7*        CMP:   Recent Labs   Lab 05/10/25  0011      K 3.8      CO2 26   *   BUN 21*   CREATININE 1.2   CALCIUM 9.8   PROT 7.5   ALBUMIN 3.9   BILITOT 0.4   ALKPHOS 100   AST 14   ALT 21   ANIONGAP 10     Cardiac Markers:   Recent Labs   Lab 05/10/25  0011   BNP 10     Coagulation: No results for input(s): "PT", "INR", "APTT" in the last 48 hours.  Lactic Acid: No results for input(s): "LACTATE" in the last 48 hours.  Lipase:   Recent Labs   Lab 05/10/25  0011   LIPASE 98*     Lipid Panel: No results for input(s): "CHOL", "HDL", "LDLCALC", "TRIG", "CHOLHDL" in the last 48 hours.  Magnesium:   Recent Labs   Lab 05/10/25  0011   MG 2.5     Troponin: No results for input(s): "TROPONINI", "TROPONINIHS" in the last 48 hours.  TSH: No results for input(s): "TSH" in the last 4320 hours.  Urine Culture: No results for input(s): "LABURIN" in the last 48 hours.  Urine Studies: No results for input(s): "COLORU", "APPEARANCEUA", "PHUR", "SPECGRAV", "PROTEINUA", "GLUCUA", "KETONESU", "BILIRUBINUA", "OCCULTUA", "NITRITE", "UROBILINOGEN", "LEUKOCYTESUR", "RBCUA", "WBCUA", "BACTERIA", "SQUAMEPITHEL", "HYALINECASTS" in the last 48 hours.    Invalid input(s): "WRIGHTSUR"    Significant Imaging: I have reviewed all pertinent imaging results/findings within the past 24 hours.  I have reviewed and interpreted all pertinent imaging results/findings within the past 24 hours.  "

## 2025-05-10 NOTE — PT/OT/SLP PROGRESS
Physical Therapy      Patient Name:  Meg Lockhart   MRN:  [id number]    Patient not seen today secondary to Patient unwilling to participate, Other (Comment) (dizzy). Will follow-up 5/11/25.

## 2025-05-10 NOTE — CARE UPDATE
Patient transferred to [facility].  Patient seen and examined.  NAD.  Chart reviewed.  All patient's presenting symptoms have resolved at time of exam.  MRI/MRA pending.  Tele neurology consult pending.  I have reviewed history and physical written by admitting provider.  I agree with findings and will assume care for patient at this time.

## 2025-05-10 NOTE — SUBJECTIVE & OBJECTIVE
HPI:  55 y.o. female with a past medical history of a previous cerebellar CVA, type 2 diabetes, anemia, and previous bariatric surgery that presents to the ED for multiple complaints.  Patient states that she had a previous stroke in June which consisted of her having vertigo, loss of balance in her lower extremity, and burning sensation in her lower extremities.  Patient had an episode tonight in which she was in the bathroom and she felt like her whole-body locked up.  She felt vertigo and like she was unable to control her head.  She did hit her head against the wall.  She also complained of some chest discomfort which has mostly resolved.  She had episodes of nausea and vomiting and felt like her abdomen was punched.  Her abdominal discomfort and chest pain have subsided, but she continues to complain of mild nausea.  All of her symptoms have resolved.  Entire episode lasted a proximally 40 minutes.  Not complaining of any unilateral weakness, vision changes, numbness, shortness of breath, or fevers.  No URI symptoms.      She had CVA 2024 with some residual right foot drop only         In the last few days was getting very similar symptoms listed above , which were what she had with her CVA in 2024     Currently still dizzy with head movements and room spinning. Has not eaten much or drank much given she is on ozempic     Images personally reviewed and interpreted:  Study: Head CT  Study Interpretation: neg    Additional studies reviewed:   Study: Cardiac_Neuro: none  Study Interpretation: N/A    Laboratory studies reviewed:  BMP:   Lab Results   Component Value Date     05/10/2025    K 3.8 05/10/2025     05/10/2025    CO2 26 05/10/2025    BUN 21 (H) 05/10/2025    CREATININE 1.2 05/10/2025    CALCIUM 9.8 05/10/2025     CBC:   Lab Results   Component Value Date    WBC 8.33 05/10/2025    RBC 3.93 (L) 05/10/2025    HGB 11.6 (L) 05/10/2025    HCT 35.7 (L) 05/10/2025     05/10/2025    MCV 91  05/10/2025    MCH 29.5 05/10/2025    MCHC 32.5 05/10/2025     Lipid Panel:   Lab Results   Component Value Date    CHOL 140 06/18/2024    LDLCALC 55.0 (L) 06/18/2024    HDL 54 06/18/2024    TRIG 155 (H) 06/18/2024     Coagulation:   Lab Results   Component Value Date    INR 0.9 06/19/2024    APTT <21.0 06/19/2024     Hgb A1C:   Lab Results   Component Value Date    HGBA1C 8.8 (H) 05/10/2025     TSH:   Lab Results   Component Value Date    TSH 2.633 06/18/2024       Documentation personally reviewed:  Notes: Notes: ED notes and H&P     Assessment and plan:  55 y.o. female with a past medical history of a previous cerebellar CVA, type 2 diabetes, anemia, and previous bariatric surgery that presents to the ED for multiple complaints.  Patient states that she had a previous stroke in June which consisted of her having vertigo, loss of balance in her lower extremity, and burning sensation in her lower extremities.  Patient had an episode tonight in which she was in the bathroom and she felt like her whole-body locked up.  She felt vertigo and like she was unable to control her head.  She did hit her head against the wall.  She also complained of some chest discomfort which has mostly resolved.  She had episodes of nausea and vomiting and felt like her abdomen was punched.  Her abdominal discomfort and chest pain have subsided, but she continues to complain of mild nausea.  All of her symptoms have resolved.  Entire episode lasted a proximally 40 minutes.  Not complaining of any unilateral weakness, vision changes, numbness, shortness of breath, or fevers.  No URI symptoms.    MRI and MRA brain  Meclizine 25 mg q8h scheduled for 3 doses then prn  If vertigo presists- valium 2 mg po q8h scheduled for 3 doses then prn  Aspirin and lipitor  ECHO  Vascular risk factor optimization    Please recall neurology if MRI is abnormal    Post charge discharge plan:  Clinic follow up: General Neurology    Visit Type: in-person  only  Timeframe: 4 weeks

## 2025-05-10 NOTE — CONSULTS
"RD remote for coverage. Attached the following nutritional education to patient's discharge papers:     "Heart Healthy diet"  "Carb counting for adults with diabetes"    On site RD will provide additional handout(s), discuss nutritional education and answer any questions/concerns the patient may have at the RD follow up.   *Please re-consult as needed     Thank you,     Bailey Noriega RDN, LDN  "

## 2025-05-10 NOTE — ED PROVIDER NOTES
Encounter Date: 5/9/2025       History     Chief Complaint   Patient presents with    Fatigue     Pt reports hx of strokes.  Pt reports a feeling like she couldn't move or talk for about 20 minute about 9 PM.  Pt says she is now feeling fatigued like she just wants to lie down     Abdominal Pain    Chest Pain     Pt reports headache off and on for about a week.  Pt took lorazepam to help with anxiety.  Pt reports 7 bouts of vomiting in the last hour     HPI    Meg Lockhart is a 55 y.o. female with a past medical history of a previous cerebellar CVA, type 2 diabetes, anemia, and previous bariatric surgery that presents to the ED for multiple complaints.  Patient states that she had a previous stroke in June which consisted of her having vertigo, loss of balance in her lower extremity, and burning sensation in her lower extremities.  Patient had an episode tonight in which she was in the bathroom and she felt like her whole-body locked up.  She felt vertigo and like she was unable to control her head.  She did hit her head against the wall.  She also complained of some chest discomfort which has mostly resolved.  She had episodes of nausea and vomiting and felt like her abdomen was punched.  Her abdominal discomfort and chest pain have subsided, but she continues to complain of mild nausea.  All of her symptoms have resolved.  Entire episode lasted a proximally 40 minutes.  Not complaining of any unilateral weakness, vision changes, numbness, shortness of breath, or fevers.  No URI symptoms.    Review of patient's allergies indicates:   Allergen Reactions    Penicillins Itching and Anaphylaxis    Sulfa (sulfonamide antibiotics) Anaphylaxis    Meperidine Itching     Past Medical History:   Diagnosis Date    Anxiety     Diabetes mellitus, type 2     Elevated cholesterol     GERD (gastroesophageal reflux disease)     Hypertension     PUD (peptic ulcer disease)      Past Surgical History:   Procedure Laterality  Date    GASTRIC BYPASS      HYSTERECTOMY      KNEE SURGERY Left      Family History   Problem Relation Name Age of Onset    Heart disease Mother 39     Hypertension Mother 39     Heart attack Mother 39     Stroke Brother 28     Cancer Maternal Aunt      Cancer Maternal Grandmother          lung    Heart disease Maternal Grandmother      Diabetes Paternal Grandmother      Heart disease Paternal Grandmother      Diabetes Paternal Grandfather       Social History[1]  Review of Systems   All other systems reviewed and are negative.      Physical Exam     Initial Vitals [05/09/25 2228]   BP Pulse Resp Temp SpO2   104/76 86 18 97.7 °F (36.5 °C) 99 %      MAP       --         Physical Exam    Nursing note and vitals reviewed.  Constitutional: She appears well-developed and well-nourished.   HENT:   Head: Normocephalic and atraumatic.   Eyes: EOM are normal. Pupils are equal, round, and reactive to light.   Neck:   Normal range of motion.  Cardiovascular:  Normal rate, regular rhythm and normal heart sounds.           Pulmonary/Chest: Breath sounds normal. No respiratory distress. She has no wheezes. She has no rhonchi. She has no rales.   Abdominal: Abdomen is soft. She exhibits no distension. There is no abdominal tenderness. There is no rebound.   Musculoskeletal:         General: Normal range of motion.      Cervical back: Normal range of motion.     Neurological: She is alert and oriented to person, place, and time. She has normal strength. No cranial nerve deficit or sensory deficit. GCS score is 15. GCS eye subscore is 4. GCS verbal subscore is 5. GCS motor subscore is 6.   Normal finger-nose-finger.  Normal heel-to-shin.  Strength and sensation are intact throughout.   Skin: Capillary refill takes less than 2 seconds. No rash noted.   Psychiatric: She has a normal mood and affect. Thought content normal.         ED Course   Procedures  Labs Reviewed   COMPREHENSIVE METABOLIC PANEL - Abnormal       Result Value     Sodium 139      Potassium 3.8      Chloride 103      CO2 26      Glucose 163 (*)     BUN 21 (*)     Creatinine 1.2      Calcium 9.8      Protein Total 7.5      Albumin 3.9      Bilirubin Total 0.4            AST 14      ALT 21      Anion Gap 10      eGFR 54 (*)    LIPASE - Abnormal    Lipase Level 98 (*)    CBC WITH DIFFERENTIAL - Abnormal    WBC 8.33      RBC 3.93 (*)     Hgb 11.6 (*)     Hct 35.7 (*)     MCV 91      MCH 29.5      MCHC 32.5      RDW 13.2      Platelet Count 365      MPV 10.3      Nucleated RBC 0      Neut % 68.7      Lymph % 23.4      Mono % 6.0      Eos % 0.7      Basophil % 0.6      Imm Grans % 0.6 (*)     Neut # 5.7      Lymph # 1.95      Mono # 0.50      Eos # 0.06      Baso # 0.05      Imm Grans # 0.05 (*)    ALCOHOL,MEDICAL (ETHANOL) - Abnormal    Alcohol, Serum 11 (*)    POCT GLUCOSE - Abnormal    POCT Glucose 111 (*)    HEPATITIS C ANTIBODY - Normal    Hep C Ab Interp Non-Reactive     HIV 1 / 2 ANTIBODY - Normal    HIV 1/2 Ag/Ab Non-Reactive     MAGNESIUM - Normal    Magnesium 2.5     TROPONIN I HIGH SENSITIVITY - Normal    Troponin High Sensitive 2.9     B-TYPE NATRIURETIC PEPTIDE - Normal    BNP 10     TROPONIN I HIGH SENSITIVITY - Normal    Troponin High Sensitive 3.2     CBC W/ AUTO DIFFERENTIAL    Narrative:     The following orders were created for panel order CBC auto differential.  Procedure                               Abnormality         Status                     ---------                               -----------         ------                     CBC with Differential[[id number]]       Abnormal            Final result                 Please view results for these tests on the individual orders.   POCT GLUCOSE MONITORING CONTINUOUS     EKG Readings: (Independently Interpreted)   Initial Reading: No STEMI. Rhythm: Normal Sinus Rhythm. Ectopy: No Ectopy. Conduction: Normal. T Waves: Normal. T Waves Flipped: II, III and AVF. Q Waves: II, III and AVF. Clinical Impression:  Normal Sinus Rhythm     ECG Results              EKG 12-lead (Final result)        Collection Time Result Time QRS Duration OHS QTC Calculation    05/09/25 22:17:27 05/10/25 18:11:31 82 447                     Final result by [facility] In [facility] (05/10/25 18:11:39)                   Narrative:    Test Reason : R07.9,    Vent. Rate :  79 BPM     Atrial Rate :  79 BPM     P-R Int : 152 ms          QRS Dur :  82 ms      QT Int : 390 ms       P-R-T Axes :  64  47   7 degrees    QTcB Int : 447 ms    Normal sinus rhythm  Normal ECG  When compared with ECG of 18-Jun-2024 16:22,  No significant change was found  Confirmed by Jayant Ramos ([id number]) on 5/10/2025 6:11:27 PM    Referred By: AAAREFERRAL SELF           Confirmed By: Jayant Ramos                                  Imaging Results              MRA Brain without contrast (Final result)  Result time 05/10/25 16:36:28      Final result by Jessa Anne MD (05/10/25 16:36:28)                   Impression:      Mild white matter changes suggesting microvascular ischemic change.  Interval progression of the findings of acute infarct that was seen on the prior exam of 06/18/2024 with appearance now consistent with areas of gliosis and old infarcts cerebellar hemispheres bilaterally right larger than left and right side of the joaquin.    No acute intracranial findings.  No restricted diffusion suggesting acute infarct.  No major vascular occlusion or significant stenosis seen on the MRA.      Electronically signed by: Jessa Anne MD  Date:    05/10/2025  Time:    16:36               Narrative:    EXAMINATION:  MRA BRAIN WITHOUT CONTRAST; MRI BRAIN WITHOUT CONTRAST    CLINICAL HISTORY:  Transient ischemic attack (TIA);    TECHNIQUE:  Non-contrast 3-D time-of-flight intracranial MR angiography was performed through the [place] [facility] [place] with MIP reformatting.    Multisequence multiplanar imaging the brain was obtained without  contrast    COMPARISON:  06/18/2024    FINDINGS:  Intracranially no major vascular occlusion or significant stenosis.  Intracranial vertebral arteries, basilar artery, ICAs, anterior cerebral arteries, posterior cerebral arteries and middle cerebral arteries appear patent.  No intracranial aneurysm evident.    There is no restricted diffusion suggesting acute infarct.  The craniocervical junction is within normal limits in appearance.  Ventricles, sulci, fissures unremarkable appearance.  Normal vascular flow void in major, central intracranial vessels.    Findings consistent with areas of gliosis and old infarcts bilateral cerebellar right larger than left and right side of the joaquin.  Findings correspond to where there are findings of acute infarct on the prior exam.  There are additional scattered areas of increased T2 signal in the white matter suggesting microvascular ischemic change.                                       MRI Brain Without Contrast (Final result)  Result time 05/10/25 16:36:28      Final result by Jessa Anne MD (05/10/25 16:36:28)                   Impression:      Mild white matter changes suggesting microvascular ischemic change.  Interval progression of the findings of acute infarct that was seen on the prior exam of 06/18/2024 with appearance now consistent with areas of gliosis and old infarcts cerebellar hemispheres bilaterally right larger than left and right side of the joaquin.    No acute intracranial findings.  No restricted diffusion suggesting acute infarct.  No major vascular occlusion or significant stenosis seen on the MRA.      Electronically signed by: Jessa Anne MD  Date:    05/10/2025  Time:    16:36               Narrative:    EXAMINATION:  MRA BRAIN WITHOUT CONTRAST; MRI BRAIN WITHOUT CONTRAST    CLINICAL HISTORY:  Transient ischemic attack (TIA);    TECHNIQUE:  Non-contrast 3-D time-of-flight intracranial MR angiography was performed through the [place] [facility] [place]  with MIP reformatting.    Multisequence multiplanar imaging the brain was obtained without contrast    COMPARISON:  06/18/2024    FINDINGS:  Intracranially no major vascular occlusion or significant stenosis.  Intracranial vertebral arteries, basilar artery, ICAs, anterior cerebral arteries, posterior cerebral arteries and middle cerebral arteries appear patent.  No intracranial aneurysm evident.    There is no restricted diffusion suggesting acute infarct.  The craniocervical junction is within normal limits in appearance.  Ventricles, sulci, fissures unremarkable appearance.  Normal vascular flow void in major, central intracranial vessels.    Findings consistent with areas of gliosis and old infarcts bilateral cerebellar right larger than left and right side of the joaquin.  Findings correspond to where there are findings of acute infarct on the prior exam.  There are additional scattered areas of increased T2 signal in the white matter suggesting microvascular ischemic change.                                       US Carotid Bilateral (Final result)  Result time 05/10/25 07:28:55      Final result by Germán Ricks DO (05/10/25 07:28:55)                   Impression:      Calcified plaque of the bilateral carotid bulbs proximal right ICA.  No hemodynamic significant stenosis.      Electronically signed by: Germán Ricks  Date:    05/10/2025  Time:    07:28               Narrative:      CMS MANDATED QUALITY DATA - CAROTID - 195    All measurements and percent stenosis described below were determined using NASCET criteria or criteria similar to NASCET, as defined by the Society of Radiologists in Ultrasound Consensus Conference, Radiology, 2003    EXAMINATION:  US CAROTID BILATERAL    CLINICAL HISTORY:  TIA;    COMPARISON:  None    FINDINGS:  Color Doppler, spectral Doppler, and grayscale analysis was performed.    Grayscale images show calcified plaque formation of the bilateral carotid bulbs and proximal right  ICA.    Right internal carotid artery estimated peak systolic velocities are 69.0 cm/sec proximally, 70.7 cm/sec mid, the 76.9 cm/sec distally.    Antegrade flow in right vertebral artery.    Left internal carotid artery estimated peak systolic velocities or 59.8 cm/sec proximally, 67.6 cm/sec mid,  65.7 cm/sec distally.    Antegrade flow in left vertebral artery.                                       CT Head Without Contrast (Final result)  Result time 05/10/25 01:23:55      Final result by Halley Mckeon MD (05/10/25 01:23:55)                   Impression:      No acute findings      Electronically signed by: Halley Mckeon  Date:    05/10/2025  Time:    01:23               Narrative:    EXAMINATION:  CT HEAD WITHOUT CONTRAST    CLINICAL HISTORY:  Transient ischemic attack (TIA);    TECHNIQUE:  Low dose axial images were obtained through the head.  Coronal and sagittal reformations were also performed. Contrast was not administered.    COMPARISON:  06/18/2024    FINDINGS:  Intracranial compartment:    Ventricles and sulci are normal in size for age without evidence of hydrocephalus. No extra-axial blood or fluid collections.    The brain parenchyma appears normal. No parenchymal mass, hemorrhage, edema or major vascular distribution infarct.    Skull/extracranial contents (limited evaluation): No fracture. Mastoid air cells and paranasal sinuses are essentially clear.                                       X-Ray Chest AP Portable (Final result)  Result time 05/10/25 00:57:50      Final result by Halley Mckeon MD (05/10/25 00:57:50)                   Impression:      No acute findings      Electronically signed by: Halley Mckeon  Date:    05/10/2025  Time:    00:57               Narrative:    EXAMINATION:  XR CHEST AP PORTABLE    CLINICAL HISTORY:  Chest Pain;    TECHNIQUE:  Single frontal view of the chest was performed.    COMPARISON:  06/18/2024    FINDINGS:  Lungs are clear. No focal  consolidation. No pleural effusion. No pneumothorax. Normal heart size.                                       Medications   ondansetron injection 4 mg (4 mg Intravenous Not Given 5/10/25 0230)   lactated ringers infusion ( Intravenous Stopped 5/10/25 1241)   famotidine (PF) injection 20 mg (20 mg Intravenous Given 5/10/25 1958)   sodium chloride 0.9% flush 3 mL (has no administration in time range)   melatonin tablet 6 mg (has no administration in time range)   ondansetron injection 4 mg (4 mg Intravenous Given 5/10/25 2001)   senna-docusate 8.6-50 mg per tablet 1 tablet (1 tablet Oral Given 5/10/25 1958)   aluminum-magnesium hydroxide-simethicone 200-200-20 mg/5 mL suspension 30 mL (has no administration in time range)   acetaminophen tablet 650 mg (650 mg Oral Given 5/10/25 2018)   naloxone 0.4 mg/mL injection 0.02 mg (has no administration in time range)   potassium bicarbonate disintegrating tablet 50 mEq (has no administration in time range)   potassium bicarbonate disintegrating tablet 35 mEq (has no administration in time range)   potassium bicarbonate disintegrating tablet 60 mEq (has no administration in time range)   magnesium oxide tablet 800 mg (has no administration in time range)   magnesium oxide tablet 800 mg (has no administration in time range)   potassium, sodium phosphates 280-160-250 mg packet 2 packet (has no administration in time range)   potassium, sodium phosphates 280-160-250 mg packet 2 packet (has no administration in time range)   potassium, sodium phosphates 280-160-250 mg packet 2 packet (has no administration in time range)   glucose chewable tablet 16 g (has no administration in time range)   glucose chewable tablet 24 g (has no administration in time range)   dextrose 50% injection 12.5 g (has no administration in time range)   dextrose 50% injection 25 g (has no administration in time range)   glucagon (human recombinant) injection 1 mg (has no administration in time range)    sodium chloride 0.9% flush 10 mL (has no administration in time range)   sodium chloride 0.9% bolus 500 mL 500 mL (has no administration in time range)   labetaloL injection 10 mg (has no administration in time range)   bisacodyL suppository 10 mg (has no administration in time range)   aspirin EC tablet 325 mg (325 mg Oral Given 5/10/25 0824)   LORazepam tablet 1 mg (has no administration in time range)   glucose chewable tablet 16 g (has no administration in time range)   glucose chewable tablet 24 g (has no administration in time range)   dextrose 50% injection 12.5 g (has no administration in time range)   dextrose 50% injection 25 g (has no administration in time range)   glucagon (human recombinant) injection 1 mg (has no administration in time range)   insulin glargine U-100 (Lantus) pen 20 Units (20 Units Subcutaneous Given 5/10/25 0822)   insulin aspart U-100 pen 0-10 Units (1 Units Subcutaneous Given 5/10/25 1958)   meclizine tablet 25 mg (has no administration in time range)   LORazepam injection 1 mg (1 mg Intravenous Given 5/10/25 1510)   atorvastatin tablet 80 mg (has no administration in time range)   aspirin tablet 325 mg (325 mg Oral Given 5/10/25 0610)   meclizine tablet 25 mg (25 mg Oral Given 5/11/25 0029)     Medical Decision Making  Meg Lockhart is a 55 y.o. female with a past medical history of a previous cerebellar CVA, type 2 diabetes, anemia, and previous bariatric surgery that presents to the ED for multiple complaints.  Symptoms resolved at this time.  Vitals were stable.  Exam unremarkable.  Differential includes but isn't limited to ACS, pneumonia, pneumothorax, symptomatic pleural effusion, symptomatic anemia, pulmonary edema, CVA, TIA, and complex migraine.  CT head was negative.  CBC and CMP are unremarkable.  Ethanol only elevated to 11.  Lipase mildly elevated to 98.  Troponin and BNP within normal limits.  EKG unremarkable as detailed above.  Given that patient had  symptoms that were similar to her previous stroke which have now resolved, this does raise suspicion for possible TIA.  We will admit to Hospital Medicine for TIA workup.    Amount and/or Complexity of Data Reviewed  Labs: ordered.  Radiology: ordered.    Risk  OTC drugs.  Prescription drug management.  Decision regarding hospitalization.                                      Clinical Impression:  Final diagnoses:  [R07.9] Chest pain  [G45.9] TIA (transient ischemic attack) (Primary)          ED Disposition Condition    Observation                     [1]   Social History  Tobacco Use    Smoking status: Never     Passive exposure: Never    Smokeless tobacco: Never   Substance Use Topics    Alcohol use: No    Drug use: No        Drew Stock MD  05/11/25 0109

## 2025-05-10 NOTE — ASSESSMENT & PLAN NOTE
Patient's blood pressure range in the last 24 hours was: BP  Min: 104/76  Max: 144/80.The patient's inpatient anti-hypertensive regimen is listed below:  Current Antihypertensives  labetaloL injection 10 mg, Every 15 min PRN, Intravenous    Plan  - for now , hold home BP meds    I dont want BP too low     We can let it run high for this morning

## 2025-05-10 NOTE — H&P
[facility] - Emergency Dept  Hospital Medicine  History & Physical    Patient Name: Meg Lockhart  MRN: [id number]  Patient Class: OP- Observation  Admission Date: 5/9/2025  Attending Physician: Giovanni Kaur MD  Primary Care Provider: Meera Andres MD         Patient information was obtained from patient and ER records.     Subjective:     Principal Problem:TIA (transient ischemic attack)    Chief Complaint:   Chief Complaint   Patient presents with    Fatigue     Pt reports hx of strokes.  Pt reports a feeling like she couldn't move or talk for about 20 minute about 9 PM.  Pt says she is now feeling fatigued like she just wants to lie down     Abdominal Pain    Chest Pain     Pt reports headache off and on for about a week.  Pt took lorazepam to help with anxiety.  Pt reports 7 bouts of vomiting in the last hour        HPI: 55 y.o. female with a past medical history of a previous cerebellar CVA, type 2 diabetes, anemia, and previous bariatric surgery that presents to the ED for multiple complaints.  Patient states that she had a previous stroke in June which consisted of her having vertigo, loss of balance in her lower extremity, and burning sensation in her lower extremities.  Patient had an episode tonight in which she was in the bathroom and she felt like her whole-body locked up.  She felt vertigo and like she was unable to control her head.  She did hit her head against the wall.  She also complained of some chest discomfort which has mostly resolved.  She had episodes of nausea and vomiting and felt like her abdomen was punched.  Her abdominal discomfort and chest pain have subsided, but she continues to complain of mild nausea.  All of her symptoms have resolved.  Entire episode lasted a proximally 40 minutes.  Not complaining of any unilateral weakness, vision changes, numbness, shortness of breath, or fevers.  No URI symptoms.    Non smoker  Non drinker      She had CVA 2024 with some  residual right foot drop only       In the last 4 days was getting very similar symptoms listed above , which were what she had with her CVA in 2024      Including headache.    Headache has been on and off and its mostly left side head .        Dizzy , nausea, weakness, headache   are all happening a few times in last 4 days         So that's why she came to our ER       IN OUR ER      Labs normal     CT head NEG    No CTA done         All symptoms gone when I saw her in ER     She felt fine asked about going home      But I explained that we wanted her to stay for more testing since these symptoms remind her of the stroke she just had last year   And she agreed         Past Medical History:   Diagnosis Date    Anxiety     Diabetes mellitus, type 2     Elevated cholesterol     GERD (gastroesophageal reflux disease)     Hypertension     PUD (peptic ulcer disease)        Past Surgical History:   Procedure Laterality Date    GASTRIC BYPASS      HYSTERECTOMY      KNEE SURGERY Left        Review of patient's allergies indicates:   Allergen Reactions    Penicillins Itching and Anaphylaxis    Sulfa (sulfonamide antibiotics) Anaphylaxis    Meperidine Itching       No current facility-administered medications on file prior to encounter.     Current Outpatient Medications on File Prior to Encounter   Medication Sig    aspirin (ECOTRIN) 81 MG EC tablet Take 1 tablet (81 mg total) by mouth once daily.    atorvastatin (LIPITOR) 80 MG tablet Take 1 tablet (80 mg total) by mouth once daily.    blood sugar diagnostic Strp To check BG 4 times daily, to use with insurance preferred meter    blood-glucose meter (ACCU-CHEK GUIDE GLUCOSE METER) [facility] use to check BG 4 times a day    chlorthalidone (HYGROTEN) 25 MG Tab Take 1 tablet (25 mg total) by mouth once daily.    famotidine (PEPCID) 40 MG tablet Take 1 tablet (40 mg total) by mouth once daily.    furosemide (LASIX) 20 MG tablet daily as needed.    insulin glargine U-100, Lantus,  (BASAGLAR KWIKPEN U-100 INSULIN) 100 unit/mL (3 mL) InPn pen Inject 20 Units into the skin once daily. (Patient taking differently: Inject 9 Units into the skin 2 (two) times a day.)    insulin lispro 200 unit/mL (3 mL) InPn Inject 6 Units into the skin 3 (three) times daily. (Patient not taking: Reported on 7/12/2024)    lancets Misc Use to check BG four times daily    LORazepam (ATIVAN) 1 MG tablet Take 1 mg by mouth daily as needed for Anxiety.    meclizine HCl (MECLIZINE ORAL) Take by mouth daily as needed.    metFORMIN (FORTAMET) 1,000 mg 24hr tablet Take 1,000 mg by mouth daily with breakfast. (Patient not taking: Reported on 2/10/2025)    OZEMPIC 0.25 mg or 0.5 mg (2 mg/3 mL) pen injector every 7 days.    SITagliptin phosphate (JANUVIA) 25 MG Tab Take 1 tablet (25 mg total) by mouth once daily.     Family History       Problem Relation (Age of Onset)    Cancer Maternal Aunt, Maternal Grandmother    Diabetes Paternal Grandmother, Paternal Grandfather    Heart attack Mother    Heart disease Mother, Maternal Grandmother, Paternal Grandmother    Hypertension Mother    Stroke Brother          Tobacco Use    Smoking status: Never     Passive exposure: Never    Smokeless tobacco: Never   Substance and Sexual Activity    Alcohol use: No    Drug use: No    Sexual activity: Yes     Partners: Male     Review of Systems   All other systems reviewed and are negative.    Objective:     Vital Signs (Most Recent):  Temp: 97.7 °F (36.5 °C) (05/09/25 2228)  Pulse: 86 (05/10/25 0658)  Resp: 20 (05/10/25 0630)  BP: 119/75 (05/10/25 0658)  SpO2: 97 % (05/10/25 0658) Vital Signs (24h Range):  Temp:  [97.7 °F (36.5 °C)] 97.7 °F (36.5 °C)  Pulse:  [82-91] 86  Resp:  [18-20] 20  SpO2:  [96 %-100 %] 97 %  BP: (104-144)/(74-83) 119/75     Weight: 85.7 kg (189 lb)  Body mass index is 31.45 kg/m².     Physical Exam  Vitals and nursing note reviewed.   Constitutional:       Appearance: Normal appearance.   HENT:      Head: Normocephalic.  "     Nose: Nose normal.      Mouth/Throat:      Mouth: Mucous membranes are moist.   Eyes:      Extraocular Movements: Extraocular movements intact.      Pupils: Pupils are equal, round, and reactive to light.   Cardiovascular:      Rate and Rhythm: Normal rate.      Pulses: Normal pulses.      Heart sounds: Normal heart sounds.   Pulmonary:      Effort: Pulmonary effort is normal.      Breath sounds: Normal breath sounds.   Abdominal:      General: Abdomen is flat. Bowel sounds are normal.   Musculoskeletal:         General: Normal range of motion.      Cervical back: Normal range of motion.   Skin:     General: Skin is warm.      Capillary Refill: Capillary refill takes less than 2 seconds.   Neurological:      General: No focal deficit present.      Mental Status: She is alert and oriented to person, place, and time.   Psychiatric:         Behavior: Behavior normal.              CRANIAL NERVES     CN III, IV, VI   Pupils are equal, round, and reactive to light.       Significant Labs: All pertinent labs within the past 24 hours have been reviewed.  CBC:   Recent Labs   Lab 05/10/25  0011   WBC 8.33   HGB 11.6*   HCT 35.7*        CMP:   Recent Labs   Lab 05/10/25  0011      K 3.8      CO2 26   *   BUN 21*   CREATININE 1.2   CALCIUM 9.8   PROT 7.5   ALBUMIN 3.9   BILITOT 0.4   ALKPHOS 100   AST 14   ALT 21   ANIONGAP 10     Cardiac Markers:   Recent Labs   Lab 05/10/25  0011   BNP 10     Coagulation: No results for input(s): "PT", "INR", "APTT" in the last 48 hours.  Lactic Acid: No results for input(s): "LACTATE" in the last 48 hours.  Lipase:   Recent Labs   Lab 05/10/25  0011   LIPASE 98*     Lipid Panel: No results for input(s): "CHOL", "HDL", "LDLCALC", "TRIG", "CHOLHDL" in the last 48 hours.  Magnesium:   Recent Labs   Lab 05/10/25  0011   MG 2.5     Troponin: No results for input(s): "TROPONINI", "TROPONINIHS" in the last 48 hours.  TSH: No results for input(s): "TSH" in the last " "4320 hours.  Urine Culture: No results for input(s): "LABURIN" in the last 48 hours.  Urine Studies: No results for input(s): "COLORU", "APPEARANCEUA", "PHUR", "SPECGRAV", "PROTEINUA", "GLUCUA", "KETONESU", "BILIRUBINUA", "OCCULTUA", "NITRITE", "UROBILINOGEN", "LEUKOCYTESUR", "RBCUA", "WBCUA", "BACTERIA", "SQUAMEPITHEL", "HYALINECASTS" in the last 48 hours.    Invalid input(s): "WRIGHTSUR"    Significant Imaging: I have reviewed all pertinent imaging results/findings within the past 24 hours.  I have reviewed and interpreted all pertinent imaging results/findings within the past 24 hours.  Assessment/Plan:     Assessment & Plan  TIA (transient ischemic attack)  All symptoms gone  But worrisome since its same symptoms she had last year with CVA      BP on lower side when I saw her     PLAN    - id like to get MRI and MRA brain this morning     - carotid US also     - had echo bubble study last year   No need for new one    - no need for CTA at this time since not looking for [place] area     - continue Aspirin    I did 325 mg for now  Continue her 80 mg lipitor po daily     - CVA Pathway was put in       - neurology consultation also       - reg diet    Pepcid IV BID      IVFs  also   seems dry on labs    Diabetes mellitus without complication  She says her latest A1C was better down to 8 range       Lab Results   Component Value Date    HGBA1C 10.0 (H) 02/06/2025     PLAN    - I did basal insulin 20 units SQ Am     And moderate slide scale ACHS    Reg diet     Hypertension  Patient's blood pressure range in the last 24 hours was: BP  Min: 104/76  Max: 144/80.The patient's inpatient anti-hypertensive regimen is listed below:  Current Antihypertensives  labetaloL injection 10 mg, Every 15 min PRN, Intravenous    Plan  - for now , hold home BP meds    I dont want BP too low     We can let it run high for this morning  VTE Risk Mitigation (From admission, onward)           Ordered     Reason for No Pharmacological VTE " Prophylaxis  Once        Question:  Reasons:  Answer:  Patient is Ambulatory    05/10/25 0610     Place sequential compression device  Until discontinued         05/10/25 0610     IP VTE HIGH RISK PATIENT  Once         05/10/25 0610     Place sequential compression device  Until discontinued         05/10/25 0610     Reason for No Pharmacological VTE Prophylaxis  Once        Question:  Reasons:  Answer:  Patient is Ambulatory    05/10/25 0610                       On 05/10/2025, patient should be placed in hospital observation services under my care.             Giovanni Kaur MD  Department of Hospital Medicine  [facility] - Emergency Dept

## 2025-05-10 NOTE — NURSING
[facility]. A&ox4. Able to make needs known.

## 2025-05-11 VITALS
OXYGEN SATURATION: 99 % | WEIGHT: 186.5 LBS | BODY MASS INDEX: 31.07 KG/M2 | TEMPERATURE: 99 F | DIASTOLIC BLOOD PRESSURE: 77 MMHG | RESPIRATION RATE: 16 BRPM | HEART RATE: 100 BPM | SYSTOLIC BLOOD PRESSURE: 125 MMHG | HEIGHT: 65 IN

## 2025-05-11 LAB
ABSOLUTE EOSINOPHIL (SMH): 0.09 K/UL
ABSOLUTE MONOCYTE (SMH): 0.49 K/UL (ref 0.3–1)
ABSOLUTE NEUTROPHIL COUNT (SMH): 2.4 K/UL (ref 1.8–7.7)
ALBUMIN SERPL-MCNC: 3.1 G/DL (ref 3.5–5.2)
ALP SERPL-CCNC: 89 UNIT/L (ref 40–150)
ALT SERPL-CCNC: 19 UNIT/L (ref 10–44)
ANION GAP (SMH): 11 MMOL/L (ref 8–16)
AST SERPL-CCNC: 19 UNIT/L (ref 11–45)
BASOPHILS # BLD AUTO: 0.04 K/UL
BASOPHILS NFR BLD AUTO: 0.6 %
BILIRUB SERPL-MCNC: 0.5 MG/DL (ref 0.1–1)
BUN SERPL-MCNC: 21 MG/DL (ref 6–20)
CALCIUM SERPL-MCNC: 9.2 MG/DL (ref 8.7–10.5)
CHLORIDE SERPL-SCNC: 107 MMOL/L (ref 95–110)
CO2 SERPL-SCNC: 22 MMOL/L (ref 23–29)
CREAT SERPL-MCNC: 1.3 MG/DL (ref 0.5–1.4)
ERYTHROCYTE [DISTWIDTH] IN BLOOD BY AUTOMATED COUNT: 13.2 % (ref 11.5–14.5)
GFR SERPLBLD CREATININE-BSD FMLA CKD-EPI: 49 ML/MIN/1.73/M2
GLUCOSE SERPL-MCNC: 82 MG/DL (ref 70–110)
HCT VFR BLD AUTO: 34.9 % (ref 37–48.5)
HGB BLD-MCNC: 11 GM/DL (ref 12–16)
IMM GRANULOCYTES # BLD AUTO: 0.03 K/UL (ref 0–0.04)
IMM GRANULOCYTES NFR BLD AUTO: 0.5 % (ref 0–0.5)
LYMPHOCYTES # BLD AUTO: 3.09 K/UL (ref 1–4.8)
MAGNESIUM SERPL-MCNC: 2.3 MG/DL (ref 1.6–2.6)
MCH RBC QN AUTO: 28.4 PG (ref 27–31)
MCHC RBC AUTO-ENTMCNC: 31.5 G/DL (ref 32–36)
MCV RBC AUTO: 90 FL (ref 82–98)
NUCLEATED RBC (/100WBC) (SMH): 0 /100 WBC
PLATELET # BLD AUTO: 345 K/UL (ref 150–450)
PMV BLD AUTO: 10.2 FL (ref 9.2–12.9)
POCT GLUCOSE: 110 MG/DL (ref 70–110)
POCT GLUCOSE: 97 MG/DL (ref 70–110)
POTASSIUM SERPL-SCNC: 3.7 MMOL/L (ref 3.5–5.1)
PROT SERPL-MCNC: 6.7 GM/DL (ref 6–8.4)
RBC # BLD AUTO: 3.87 M/UL (ref 4–5.4)
RELATIVE EOSINOPHIL (SMH): 1.5 % (ref 0–8)
RELATIVE LYMPHOCYTE (SMH): 50.1 % (ref 18–48)
RELATIVE MONOCYTE (SMH): 7.9 % (ref 4–15)
RELATIVE NEUTROPHIL (SMH): 39.4 % (ref 38–73)
SODIUM SERPL-SCNC: 140 MMOL/L (ref 136–145)
WBC # BLD AUTO: 6.17 K/UL (ref 3.9–12.7)

## 2025-05-11 PROCEDURE — G0378 HOSPITAL OBSERVATION PER HR: HCPCS

## 2025-05-11 PROCEDURE — 94761 N-INVAS EAR/PLS OXIMETRY MLT: CPT

## 2025-05-11 PROCEDURE — 63600175 PHARM REV CODE 636 W HCPCS: Performed by: INTERNAL MEDICINE

## 2025-05-11 PROCEDURE — 25000003 PHARM REV CODE 250: Performed by: INTERNAL MEDICINE

## 2025-05-11 PROCEDURE — 25000003 PHARM REV CODE 250

## 2025-05-11 PROCEDURE — 83735 ASSAY OF MAGNESIUM: CPT | Performed by: INTERNAL MEDICINE

## 2025-05-11 PROCEDURE — 80053 COMPREHEN METABOLIC PANEL: CPT | Performed by: INTERNAL MEDICINE

## 2025-05-11 PROCEDURE — 36415 COLL VENOUS BLD VENIPUNCTURE: CPT | Performed by: INTERNAL MEDICINE

## 2025-05-11 PROCEDURE — 85025 COMPLETE CBC W/AUTO DIFF WBC: CPT | Performed by: INTERNAL MEDICINE

## 2025-05-11 PROCEDURE — 94760 N-INVAS EAR/PLS OXIMETRY 1: CPT

## 2025-05-11 PROCEDURE — 96376 TX/PRO/DX INJ SAME DRUG ADON: CPT

## 2025-05-11 RX ORDER — MECLIZINE HYDROCHLORIDE 25 MG/1
25 TABLET ORAL 3 TIMES DAILY PRN
Qty: 20 TABLET | Refills: 0 | Status: SHIPPED | OUTPATIENT
Start: 2025-05-11

## 2025-05-11 RX ORDER — HYDROXYZINE HYDROCHLORIDE 25 MG/1
25 TABLET, FILM COATED ORAL 3 TIMES DAILY PRN
Status: DISCONTINUED | OUTPATIENT
Start: 2025-05-11 | End: 2025-05-11 | Stop reason: HOSPADM

## 2025-05-11 RX ADMIN — MECLIZINE HYDROCHLORIDE 25 MG: 25 TABLET ORAL at 12:05

## 2025-05-11 RX ADMIN — POTASSIUM BICARBONATE 50 MEQ: 977.5 TABLET, EFFERVESCENT ORAL at 05:05

## 2025-05-11 RX ADMIN — INSULIN GLARGINE 20 UNITS: 100 INJECTION, SOLUTION SUBCUTANEOUS at 08:05

## 2025-05-11 RX ADMIN — ASPIRIN 325 MG: 325 TABLET, COATED ORAL at 08:05

## 2025-05-11 RX ADMIN — ACETAMINOPHEN 650 MG: 325 TABLET ORAL at 11:05

## 2025-05-11 RX ADMIN — FAMOTIDINE 20 MG: 10 INJECTION, SOLUTION INTRAVENOUS at 08:05

## 2025-05-11 NOTE — NURSING
Discharge instructions given to Patient and , both verbalized understanding. PIV removed with catheter intact. Telemetry removed and returned to monitor room. Patient escorted to [place]  to drive her home.

## 2025-05-11 NOTE — ASSESSMENT & PLAN NOTE
Patient's FSGs are controlled on current medication regimen.  Last A1c reviewed-   Lab Results   Component Value Date    HGBA1C 8.8 (H) 05/10/2025     Most recent fingerstick glucose reviewed-   Recent Labs   Lab 05/10/25  1653 05/10/25  1937 05/11/25  0743 05/11/25  1140   POCTGLUCOSE 163* 190* 97 110     Current correctional scale  Medium  Maintain anti-hyperglycemic dose as follows-   Antihyperglycemics (From admission, onward)      Start     Stop Route Frequency Ordered    05/10/25 0900  insulin glargine U-100 (Lantus) pen 20 Units         -- SubQ Daily 05/10/25 0613    05/10/25 0713  insulin aspart U-100 pen 0-10 Units         -- SubQ Before meals & nightly PRN 05/10/25 0613          Hold Oral hypoglycemics while patient is in the hospital.   Patient advised to keep a log of glucose readings and bring them to follow up appointment  She may need adjustment in Ozempic dosing

## 2025-05-11 NOTE — ASSESSMENT & PLAN NOTE
Patient's blood pressure range in the last 24 hours was: BP  Min: 112/68  Max: 157/90.The patient's inpatient anti-hypertensive regimen is listed below:  Current Antihypertensives  labetaloL injection 10 mg, Every 15 min PRN, Intravenous  , Daily, Oral  Resume home medications at discharge

## 2025-05-11 NOTE — PLAN OF CARE
DC orders/medications reviewed.  PCP appointment scheduled and added to AVS.  Neurology referral placed.  No further CM needs identified at this time.  Clear to dc from CM standpoint.     05/11/25 1351   Final Note   Assessment Type Final Discharge Note   Anticipated Discharge Disposition Home   What phone number can be called within the next 1-3 days to see how you are doing after discharge? [phone]   Hospital Resources/Appts/Education Provided Appointments scheduled and added to AVS   Post-Acute Status   Discharge Delays None known at this time

## 2025-05-11 NOTE — CARE UPDATE
05/11/25 0807   Patient Assessment/Suction   Level of Consciousness (AVPU) alert   Respiratory Effort Unlabored;Normal   Expansion/Accessory Muscles/Retractions no use of accessory muscles;no retractions   Rhythm/Pattern, Respiratory pattern regular;unlabored   PRE-TX-O2   Device (Oxygen Therapy) room air   SpO2 98 %   Pulse Oximetry Type Intermittent   $ Pulse Oximetry - Multiple Charge Pulse Oximetry - Multiple   Pulse 90   Resp 17

## 2025-05-11 NOTE — PLAN OF CARE
Problem: Adult Inpatient Plan of Care  Goal: Plan of Care Review  Outcome: Progressing  Goal: Patient-Specific Goal (Individualized)  Outcome: Progressing  Goal: Absence of Hospital-Acquired Illness or Injury  Outcome: Progressing  Goal: Optimal Comfort and Wellbeing  Outcome: Progressing  Goal: Readiness for Transition of Care  Outcome: Progressing     Problem: Stroke, Ischemic (Includes Transient Ischemic Attack)  Goal: Optimal Coping  Outcome: Progressing  Goal: Optimal Cerebral Tissue Perfusion  Outcome: Progressing  Goal: Improved Sensorimotor Function  Outcome: Progressing     Problem: Diabetes Comorbidity  Goal: Blood Glucose Level Within Targeted Range  Outcome: Progressing

## 2025-05-11 NOTE — HOSPITAL COURSE
Meg Lockhart 55 y.o. female was closely monitored while in the hospital.  Patient was admitted to Hospital Medicine for transient ischemic attack.  Patient had a previous stroke in June of 2024.  At time of examined emergency department patient's symptoms had resolved.  Patient was evaluated by tele neuro recommended MRI and MRA.  Both were unremarkable for acute findings.  Patient endorsed recent increasing stress.  Patient was educated on the need to control stress levels this much as possible.  Patient educated on medication compliance.  Patient was continued on atorvastatin, aspirin, and meclizine as needed for vertigo.  Patient refused PT and OT during hospitalization.  Patient has an order for outpatient physical therapy from outside provider.  Patient stated that she is not medicine she is recently been prescribed Ozempic.  Patient educated on the need to stay well hydrated and eat adequate meals.    Patient seen and examined day of discharge.  Patient in no acute distress.  Patient educated on discharge planning, she verbalized understanding.  Patient is a follow with primary care provider in 1 week.  Ambulatory referral to neurology has been placed.  Patient is safely discharged home.

## 2025-05-11 NOTE — DISCHARGE SUMMARY
[facility] Medicine  Discharge Summary      Patient Name: Meg Lockhart  MRN: [id number]  [facility] ([place]): [id number]  Patient Class: OP- Observation  Admission Date: 5/9/2025  Hospital Length of Stay: 0 days  Discharge Date and Time: 5/11/2025  2:15 PM  Attending Physician: Donato Nick MD   Discharging Provider: Leeroy Guerrero NP  Primary Care Provider: Meera Andres MD    Primary Care Team: Networked reference to record PCT     HPI:   55 y.o. female with a past medical history of a previous cerebellar CVA, type 2 diabetes, anemia, and previous bariatric surgery that presents to the ED for multiple complaints.  Patient states that she had a previous stroke in June which consisted of her having vertigo, loss of balance in her lower extremity, and burning sensation in her lower extremities.  Patient had an episode tonight in which she was in the bathroom and she felt like her whole-body locked up.  She felt vertigo and like she was unable to control her head.  She did hit her head against the wall.  She also complained of some chest discomfort which has mostly resolved.  She had episodes of nausea and vomiting and felt like her abdomen was punched.  Her abdominal discomfort and chest pain have subsided, but she continues to complain of mild nausea.  All of her symptoms have resolved.  Entire episode lasted a proximally 40 minutes.  Not complaining of any unilateral weakness, vision changes, numbness, shortness of breath, or fevers.  No URI symptoms.    Non smoker  Non drinker      She had CVA 2024 with some residual right foot drop only       In the last 4 days was getting very similar symptoms listed above , which were what she had with her CVA in 2024      Including headache.    Headache has been on and off and its mostly left side head .        Dizzy , nausea, weakness, headache   are all happening a few times in last 4 days         So that's why she came to our ER       IN OUR  ER      Labs normal     CT head NEG    No CTA done         All symptoms gone when I saw her in ER     She felt fine asked about going home      But I explained that we wanted her to stay for more testing since these symptoms remind her of the stroke she just had last year   And she agreed         * No surgery found *      Hospital Course:   Meg Lockhart 55 y.o. female was closely monitored while in the hospital.  Patient was admitted to Hospital Medicine for transient ischemic attack.  Patient had a previous stroke in June of 2024.  At time of examined emergency department patient's symptoms had resolved.  Patient was evaluated by tele neuro recommended MRI and MRA.  Both were unremarkable for acute findings.  Patient endorsed recent increasing stress.  Patient was educated on the need to control stress levels this much as possible.  Patient educated on medication compliance.  Patient was continued on atorvastatin, aspirin, and meclizine as needed for vertigo.  Patient refused PT and OT during hospitalization.  Patient has an order for outpatient physical therapy from outside provider.  Patient stated that she is not medicine she is recently been prescribed Ozempic.  Patient educated on the need to stay well hydrated and eat adequate meals.    Patient seen and examined day of discharge.  Patient in no acute distress.  Patient educated on discharge planning, she verbalized understanding.  Patient is a follow with primary care provider in 1 week.  Ambulatory referral to neurology has been placed.  Patient is safely discharged home.     Goals of Care Treatment Preferences:  Code Status: Full Code      SDOH Screening:  The patient declined to be screened for utility difficulties, food insecurity, transport difficulties, housing insecurity, and interpersonal safety, so no concerns could be identified this admission.     Consults:   Consults (From admission, onward)          Status Ordering Provider     Inpatient  Consult to Neurology Services (General Neurology)  Once        Provider:  Stefania Padron MD    Completed LOBO LOPEZ     Inpatient consult to Registered Dietitian/Nutritionist  Once        Provider:  (Not yet assigned)    Completed LOBO LOPEZ     IP consult to case management/social work  Once        Provider:  (Not yet assigned)    Acknowledged LOBO LOPEZ            Assessment & Plan  TIA (transient ischemic attack)  All symptoms gone  Concern at its same symptoms she had last year with CVA  Permissive hypertension for 24 hours    PLAN  -CVA pathway  -Carotid ultrasound reviewed  -MRI/MRA brain unremarkable for acute finding  -echocardiogram reviewed  -tele neuro consulted  -received aspirin 325 NAD continue with aspirin 81 mg daily  -high-dose atorvastatin 80 mg daily    Tele Neuro recs:   MRI and MRA brain  Meclizine 25 mg q8h scheduled for 3 doses then prn  If vertigo presists- valium 2 mg po q8h scheduled for 3 doses then prn  Aspirin and lipitor  Diabetes mellitus without complication  Patient's FSGs are controlled on current medication regimen.  Last A1c reviewed-   Lab Results   Component Value Date    HGBA1C 8.8 (H) 05/10/2025     Most recent fingerstick glucose reviewed-   Recent Labs   Lab 05/10/25  1653 05/10/25  1937 05/11/25  0743 05/11/25  1140   POCTGLUCOSE 163* 190* 97 110     Current correctional scale  Medium  Maintain anti-hyperglycemic dose as follows-   Antihyperglycemics (From admission, onward)      Start     Stop Route Frequency Ordered    05/10/25 0900  insulin glargine U-100 (Lantus) pen 20 Units         -- SubQ Daily 05/10/25 0613    05/10/25 0713  insulin aspart U-100 pen 0-10 Units         -- SubQ Before meals & nightly PRN 05/10/25 0613          Hold Oral hypoglycemics while patient is in the hospital.   Patient advised to keep a log of glucose readings and bring them to follow up appointment  She may need adjustment in Ozempic dosing    Hypertension  Patient's blood  pressure range in the last 24 hours was: BP  Min: 112/68  Max: 157/90.The patient's inpatient anti-hypertensive regimen is listed below:  Current Antihypertensives  labetaloL injection 10 mg, Every 15 min PRN, Intravenous  , Daily, Oral  Resume home medications at discharge    Final Active Diagnoses:    Diagnosis Date Noted POA    PRINCIPAL PROBLEM:  TIA (transient ischemic attack) [G45.9] 05/10/2025 Yes    Hypertension [I10]  Yes    Diabetes mellitus without complication [E11.9] 02/28/2013 Yes      Problems Resolved During this Admission:       Discharged Condition: stable    Disposition: Home or Self Care    Follow Up:   Follow-up Information       Meera Andres MD. Go on 5/22/2025.    Specialty: Family Medicine  Why: 220pm.  You will see Dr Castellon  Contact information:  [address] [facility] [address] [phone]                           Patient Instructions:      Ambulatory referral/consult to Neurology   Standing Status: Future   Referral Priority: Routine Referral Type: Consultation   Referral Reason: Specialty Services Required   Requested Specialty: Neurology   Number of Visits Requested: 1     Diet Cardiac   Order Comments: See Stroke Patient Education Guide Booklet for details.     Call 911 for any of the following:   Order Comments: Call 911  right away if any of the following warning signs come on suddenly, even if the symptoms only last for a few minutes. With stroke, timing is very important.   - Warning Signs of Stroke:  - Weakness: You may feel a sudden weakness, tingling or loss of feeling on one side of your face or body.  - Vision Problems: You may have sudden double vision or trouble seeing in one or both eyes.  - Speech Problems: You may have sudden trouble talking, slured speech, or problems understanding others.  - Headache: You may have sudden, severe headache.  - Movement Problems: You may experience dizziness, a feeling of spinning, a loss of balance, a feeling of falling or  blackouts.       Significant Diagnostic Studies: Labs: BMP:   Recent Labs   Lab 05/10/25  0011 05/11/25  0318   * 82    140   K 3.8 3.7    107   CO2 26 22*   BUN 21* 21*   CREATININE 1.2 1.3   CALCIUM 9.8 9.2   MG 2.5 2.3   , CMP   Recent Labs   Lab 05/10/25  0011 05/11/25  0318    140   K 3.8 3.7    107   CO2 26 22*   * 82   BUN 21* 21*   CREATININE 1.2 1.3   CALCIUM 9.8 9.2   PROT 7.5 6.7   ALBUMIN 3.9 3.1*   BILITOT 0.4 0.5   ALKPHOS 100 89   AST 14 19   ALT 21 19   ANIONGAP 10 11   , and A1C:   Recent Labs   Lab 02/06/25  1004 05/10/25  0630   HGBA1C 10.0* 8.8*     Imaging Results              MRA Brain without contrast (Final result)  Result time 05/10/25 16:36:28      Final result by Jessa Anne MD (05/10/25 16:36:28)                   Impression:      Mild white matter changes suggesting microvascular ischemic change.  Interval progression of the findings of acute infarct that was seen on the prior exam of 06/18/2024 with appearance now consistent with areas of gliosis and old infarcts cerebellar hemispheres bilaterally right larger than left and right side of the joaquin.    No acute intracranial findings.  No restricted diffusion suggesting acute infarct.  No major vascular occlusion or significant stenosis seen on the MRA.      Electronically signed by: Jessa Anne MD  Date:    05/10/2025  Time:    16:36               Narrative:    EXAMINATION:  MRA BRAIN WITHOUT CONTRAST; MRI BRAIN WITHOUT CONTRAST    CLINICAL HISTORY:  Transient ischemic attack (TIA);    TECHNIQUE:  Non-contrast 3-D time-of-flight intracranial MR angiography was performed through the [place] [facility] [place] with MIP reformatting.    Multisequence multiplanar imaging the brain was obtained without contrast    COMPARISON:  06/18/2024    FINDINGS:  Intracranially no major vascular occlusion or significant stenosis.  Intracranial vertebral arteries, basilar artery, ICAs, anterior cerebral arteries,  posterior cerebral arteries and middle cerebral arteries appear patent.  No intracranial aneurysm evident.    There is no restricted diffusion suggesting acute infarct.  The craniocervical junction is within normal limits in appearance.  Ventricles, sulci, fissures unremarkable appearance.  Normal vascular flow void in major, central intracranial vessels.    Findings consistent with areas of gliosis and old infarcts bilateral cerebellar right larger than left and right side of the joaquin.  Findings correspond to where there are findings of acute infarct on the prior exam.  There are additional scattered areas of increased T2 signal in the white matter suggesting microvascular ischemic change.                                       MRI Brain Without Contrast (Final result)  Result time 05/10/25 16:36:28      Final result by Jessa Anne MD (05/10/25 16:36:28)                   Impression:      Mild white matter changes suggesting microvascular ischemic change.  Interval progression of the findings of acute infarct that was seen on the prior exam of 06/18/2024 with appearance now consistent with areas of gliosis and old infarcts cerebellar hemispheres bilaterally right larger than left and right side of the joaquin.    No acute intracranial findings.  No restricted diffusion suggesting acute infarct.  No major vascular occlusion or significant stenosis seen on the MRA.      Electronically signed by: Jessa Anne MD  Date:    05/10/2025  Time:    16:36               Narrative:    EXAMINATION:  MRA BRAIN WITHOUT CONTRAST; MRI BRAIN WITHOUT CONTRAST    CLINICAL HISTORY:  Transient ischemic attack (TIA);    TECHNIQUE:  Non-contrast 3-D time-of-flight intracranial MR angiography was performed through the [place] [facility] [place] with MIP reformatting.    Multisequence multiplanar imaging the brain was obtained without contrast    COMPARISON:  06/18/2024    FINDINGS:  Intracranially no major vascular occlusion or significant  stenosis.  Intracranial vertebral arteries, basilar artery, ICAs, anterior cerebral arteries, posterior cerebral arteries and middle cerebral arteries appear patent.  No intracranial aneurysm evident.    There is no restricted diffusion suggesting acute infarct.  The craniocervical junction is within normal limits in appearance.  Ventricles, sulci, fissures unremarkable appearance.  Normal vascular flow void in major, central intracranial vessels.    Findings consistent with areas of gliosis and old infarcts bilateral cerebellar right larger than left and right side of the joaquin.  Findings correspond to where there are findings of acute infarct on the prior exam.  There are additional scattered areas of increased T2 signal in the white matter suggesting microvascular ischemic change.                                       US Carotid Bilateral (Final result)  Result time 05/10/25 07:28:55      Final result by Germán Ricks DO (05/10/25 07:28:55)                   Impression:      Calcified plaque of the bilateral carotid bulbs proximal right ICA.  No hemodynamic significant stenosis.      Electronically signed by: Germán Ricks  Date:    05/10/2025  Time:    07:28               Narrative:      CMS MANDATED QUALITY DATA - CAROTID - 195    All measurements and percent stenosis described below were determined using NASCET criteria or criteria similar to NASCET, as defined by the Society of Radiologists in Ultrasound Consensus Conference, Radiology, 2003    EXAMINATION:  US CAROTID BILATERAL    CLINICAL HISTORY:  TIA;    COMPARISON:  None    FINDINGS:  Color Doppler, spectral Doppler, and grayscale analysis was performed.    Grayscale images show calcified plaque formation of the bilateral carotid bulbs and proximal right ICA.    Right internal carotid artery estimated peak systolic velocities are 69.0 cm/sec proximally, 70.7 cm/sec mid, the 76.9 cm/sec distally.    Antegrade flow in right vertebral artery.    Left  internal carotid artery estimated peak systolic velocities or 59.8 cm/sec proximally, 67.6 cm/sec mid,  65.7 cm/sec distally.    Antegrade flow in left vertebral artery.                                       CT Head Without Contrast (Final result)  Result time 05/10/25 01:23:55      Final result by Halley Mckeon MD (05/10/25 01:23:55)                   Impression:      No acute findings      Electronically signed by: Halley Mckeon  Date:    05/10/2025  Time:    01:23               Narrative:    EXAMINATION:  CT HEAD WITHOUT CONTRAST    CLINICAL HISTORY:  Transient ischemic attack (TIA);    TECHNIQUE:  Low dose axial images were obtained through the head.  Coronal and sagittal reformations were also performed. Contrast was not administered.    COMPARISON:  06/18/2024    FINDINGS:  Intracranial compartment:    Ventricles and sulci are normal in size for age without evidence of hydrocephalus. No extra-axial blood or fluid collections.    The brain parenchyma appears normal. No parenchymal mass, hemorrhage, edema or major vascular distribution infarct.    Skull/extracranial contents (limited evaluation): No fracture. Mastoid air cells and paranasal sinuses are essentially clear.                                       X-Ray Chest AP Portable (Final result)  Result time 05/10/25 00:57:50      Final result by Halley Mckeon MD (05/10/25 00:57:50)                   Impression:      No acute findings      Electronically signed by: Halley Mckeon  Date:    05/10/2025  Time:    00:57               Narrative:    EXAMINATION:  XR CHEST AP PORTABLE    CLINICAL HISTORY:  Chest Pain;    TECHNIQUE:  Single frontal view of the chest was performed.    COMPARISON:  06/18/2024    FINDINGS:  Lungs are clear. No focal consolidation. No pleural effusion. No pneumothorax. Normal heart size.                                      Pending Diagnostic Studies:       None           Medications:  Reconciled Home  Medications:      Medication List        PAUSE taking these medications      ciprofloxacin HCl 750 MG tablet  Wait to take this until your doctor or other care provider tells you to start again.  Commonly known as: CIPRO  Take 750 mg by mouth every 12 (twelve) hours.            START taking these medications      meclizine 25 mg tablet  Commonly known as: ANTIVERT  Take 1 tablet (25 mg total) by mouth 3 (three) times daily as needed for Dizziness.            CONTINUE taking these medications      amLODIPine 5 MG tablet  Commonly known as: NORVASC  Take 5 mg by mouth once daily.     aspirin 81 MG EC tablet  Commonly known as: ECOTRIN  Take 1 tablet (81 mg total) by mouth once daily.     atorvastatin 80 MG tablet  Commonly known as: LIPITOR  Take 1 tablet (80 mg total) by mouth once daily.     blood sugar diagnostic Strp  To check BG 4 times daily, to use with insurance preferred meter     blood-glucose meter Misc  Commonly known as: ACCU-CHEK GUIDE GLUCOSE METER  use to check BG 4 times a day     chlorthalidone 25 MG Tab  Commonly known as: HYGROTEN  Take 1 tablet (25 mg total) by mouth once daily.     ergocalciferol 50,000 unit Cap  Commonly known as: ERGOCALCIFEROL  Take 50,000 Units by mouth every 7 days.     famotidine 40 MG tablet  Commonly known as: PEPCID  Take 1 tablet (40 mg total) by mouth once daily.     furosemide 20 MG tablet  Commonly known as: LASIX  Take 20 mg by mouth twice a week.     JARDIANCE 25 mg tablet  Generic drug: empagliflozin  Take 25 mg by mouth once daily.     lancets Misc  Use to check BG four times daily     LANTUS SOLOSTAR U-100 INSULIN SUBQ  Inject 25 Units into the skin 2 (two) times a day.     OZEMPIC 2 mg/dose (8 mg/3 mL) Pnij  Generic drug: semaglutide  Inject 2 mg into the skin every 7 days.     pregabalin 50 MG capsule  Commonly known as: LYRICA  Take 50 mg by mouth 3 (three) times daily.              Indwelling Lines/Drains at time of discharge:   Lines/Drains/Airways        None                   Time spent on the discharge of patient: 35 minutes         Leeroy Guerrero NP  Department of Hospital Medicine  [facility]/Surg

## 2025-05-11 NOTE — PLAN OF CARE
Problem: Stroke, Ischemic (Includes Transient Ischemic Attack)  Goal: Optimal Coping  Outcome: Progressing  Goal: Optimal Cerebral Tissue Perfusion  Outcome: Progressing  Goal: Optimal Cognitive Function  Outcome: Progressing  Goal: Optimal Functional Ability  Outcome: Progressing  Goal: Effective Oxygenation and Ventilation  Outcome: Progressing  Goal: Improved Sensorimotor Function  Outcome: Progressing  Goal: Safe and Effective Swallow  Outcome: Progressing     Problem: Fall Injury Risk  Goal: Absence of Fall and Fall-Related Injury  Outcome: Progressing     Problem: Infection  Goal: Absence of Infection Signs and Symptoms  Outcome: Progressing     Problem: Adult Inpatient Plan of Care  Goal: Plan of Care Review  Outcome: Progressing  Flowsheets (Taken 5/11/2025 0318)  Plan of Care Reviewed With:   patient   spouse  Goal: Patient-Specific Goal (Individualized)  Outcome: Progressing  Flowsheets (Taken 5/11/2025 0318)  Individualized Care Needs: increased mobility and strength  Anxieties, Fears or Concerns: Pt will be able to safely ambulate independently with no tingling, numbness to extremities  Patient/Family-Specific Goals (Include Timeframe): Ability to safely perform ADL's independently  Goal: Optimal Comfort and Wellbeing  Outcome: Progressing     Problem: Diabetes Comorbidity  Goal: Blood Glucose Level Within Targeted Range  Outcome: Progressing

## 2025-05-11 NOTE — PT/OT/SLP PROGRESS
Physical Therapy      Patient Name:  Meg Lockhart   MRN:  [id number]    Patient not seen today secondary to Patient unwilling to participate, Other (Comment) (patient indicated she did not need therapy and asked if therapy could be discharged). DC PT..

## 2025-05-11 NOTE — NURSING
Magnus' NP at bedside rounding on Mrs Lockhart, plan of care discussed, all questions answered. Ozempic discussed in full length.

## 2025-05-11 NOTE — PT/OT/SLP PROGRESS
Occupational Therapy      Patient Name:  Meg Lockhart   MRN:  [id number]    Patient not seen today secondary to  (Patient declined OT services.). Acute OT signing off at this time.    5/11/2025

## 2025-05-13 ENCOUNTER — PATIENT MESSAGE (OUTPATIENT)
Dept: NEPHROLOGY | Facility: CLINIC | Age: 55
End: 2025-05-13
Payer: MEDICAID

## 2025-05-13 ENCOUNTER — TELEPHONE (OUTPATIENT)
Dept: HEMATOLOGY/ONCOLOGY | Facility: CLINIC | Age: 55
End: 2025-05-13
Payer: MEDICAID

## 2025-05-13 NOTE — TELEPHONE ENCOUNTER
Pt lab appt and appt with  on today were cancelled due to labs needing at least 24 hrs to process and appt time no longer working for pt. Pt stated that she will call back and reschedule.

## 2025-05-14 DIAGNOSIS — R82.998 HIGH URINE CREATINE: Primary | ICD-10-CM

## 2025-05-15 ENCOUNTER — PATIENT MESSAGE (OUTPATIENT)
Dept: ADMINISTRATIVE | Facility: HOSPITAL | Age: 55
End: 2025-05-15
Payer: MEDICAID

## 2025-05-15 ENCOUNTER — PATIENT MESSAGE (OUTPATIENT)
Dept: HEMATOLOGY/ONCOLOGY | Facility: CLINIC | Age: 55
End: 2025-05-15
Payer: MEDICAID

## 2025-05-19 ENCOUNTER — LAB VISIT (OUTPATIENT)
Dept: LAB | Facility: HOSPITAL | Age: 55
End: 2025-05-19
Attending: STUDENT IN AN ORGANIZED HEALTH CARE EDUCATION/TRAINING PROGRAM
Payer: MEDICAID

## 2025-05-19 DIAGNOSIS — R82.998 HIGH URINE CREATINE: ICD-10-CM

## 2025-05-19 LAB
25(OH)D3+25(OH)D2 SERPL-MCNC: 26 NG/ML (ref 30–96)
ALBUMIN SERPL BCP-MCNC: 3.3 G/DL (ref 3.5–5.2)
ANION GAP (OHS): 10 MMOL/L (ref 8–16)
BACTERIA #/AREA URNS AUTO: ABNORMAL /HPF
BILIRUB UR QL STRIP.AUTO: NEGATIVE
BUN SERPL-MCNC: 21 MG/DL (ref 6–20)
CALCIUM SERPL-MCNC: 9.4 MG/DL (ref 8.7–10.5)
CHLORIDE SERPL-SCNC: 107 MMOL/L (ref 95–110)
CLARITY UR: CLEAR
CO2 SERPL-SCNC: 22 MMOL/L (ref 23–29)
COLOR UR AUTO: YELLOW
CREAT SERPL-MCNC: 1.1 MG/DL (ref 0.5–1.4)
FERRITIN SERPL-MCNC: 222 NG/ML (ref 20–300)
GFR SERPLBLD CREATININE-BSD FMLA CKD-EPI: 60 ML/MIN/1.73/M2
GLUCOSE SERPL-MCNC: 156 MG/DL (ref 70–110)
GLUCOSE UR QL STRIP: ABNORMAL
HGB UR QL STRIP: NEGATIVE
IRON SATN MFR SERPL: 21 % (ref 20–50)
IRON SERPL-MCNC: 67 UG/DL (ref 30–160)
KETONES UR QL STRIP: NEGATIVE
LEUKOCYTE ESTERASE UR QL STRIP: NEGATIVE
MICROSCOPIC COMMENT: ABNORMAL
NITRITE UR QL STRIP: NEGATIVE
PH UR STRIP: 6 [PH]
PHOSPHATE SERPL-MCNC: 4 MG/DL (ref 2.7–4.5)
POTASSIUM SERPL-SCNC: 3.9 MMOL/L (ref 3.5–5.1)
PROT UR QL STRIP: NEGATIVE
PTH-INTACT SERPL-MCNC: 109 PG/ML (ref 9–77)
RBC #/AREA URNS AUTO: 1 /HPF (ref 0–4)
SODIUM SERPL-SCNC: 139 MMOL/L (ref 136–145)
SP GR UR STRIP: 1.02
SQUAMOUS #/AREA URNS AUTO: 1 /HPF
TIBC SERPL-MCNC: 326 UG/DL (ref 250–450)
TRANSFERRIN SERPL-MCNC: 220 MG/DL (ref 200–375)
URATE SERPL-MCNC: 7.3 MG/DL (ref 2.4–5.7)
UROBILINOGEN UR STRIP-ACNC: NEGATIVE EU/DL
WBC #/AREA URNS AUTO: 1 /HPF (ref 0–5)
YEAST UR QL AUTO: ABNORMAL /HPF

## 2025-05-19 PROCEDURE — 81001 URINALYSIS AUTO W/SCOPE: CPT

## 2025-05-19 PROCEDURE — 82306 VITAMIN D 25 HYDROXY: CPT

## 2025-05-19 PROCEDURE — 82728 ASSAY OF FERRITIN: CPT

## 2025-05-19 PROCEDURE — 36415 COLL VENOUS BLD VENIPUNCTURE: CPT | Mod: PO

## 2025-05-19 PROCEDURE — 83970 ASSAY OF PARATHORMONE: CPT

## 2025-05-19 PROCEDURE — 83540 ASSAY OF IRON: CPT

## 2025-05-19 PROCEDURE — 84550 ASSAY OF BLOOD/URIC ACID: CPT

## 2025-05-19 PROCEDURE — 80069 RENAL FUNCTION PANEL: CPT

## 2025-05-19 PROCEDURE — 82570 ASSAY OF URINE CREATININE: CPT

## 2025-05-20 LAB
CREAT UR-MCNC: 95 MG/DL (ref 15–325)
PROT UR-MCNC: 17 MG/DL
PROT/CREAT UR: 0.18 MG/G{CREAT}

## 2025-05-20 NOTE — PROGRESS NOTES
Subjective     Chief Complaint: CKD    History of Present Illness:  Ms. Meg Lockhart is a 55 y.o. female with active medical diagnosis of TIA/CVA, DM with neuropathy, obesity with hx of bariatric surgery, HLD, HTN    5/9th to 11th she was admitted for possible TIA - presented with dysphagia and weakness.     Review of Systems   Constitutional:  Negative for chills and fever.   HENT:  Negative for ear discharge and ear pain.    Eyes:  Negative for blurred vision and double vision.   Respiratory:  Negative for cough and shortness of breath.    Cardiovascular:  Negative for chest pain and palpitations.   Gastrointestinal:  Negative for abdominal pain, constipation, diarrhea, nausea and vomiting.   Genitourinary:  Negative for dysuria and frequency.   Musculoskeletal:  Negative for myalgias and neck pain.   Skin:  Negative for itching and rash.   Neurological:  Negative for tingling and headaches.         ASSESSMENT & PLAN:     Stage 3a chronic kidney disease  -     US Retroperitoneal Complete; Future; Expected date: 05/21/2025  -     Renal Function Panel; Future; Expected date: 05/28/2025  -     PTH, Intact; Future; Expected date: 05/21/2026  -     Ferritin; Future; Expected date: 05/21/2026  -     Iron and TIBC; Future; Expected date: 08/21/2025  -     Protein/Creatinine Ratio, Urine; Future; Expected date: 05/21/2026  -     Urinalysis; Future; Expected date: 05/21/2026  -     Renal Function Panel; Future; Expected date: 05/21/2026    High urine creatine  -     Ambulatory referral/consult to Nephrology    Other specified diabetes mellitus with stage 3a chronic kidney disease, unspecified whether long term insulin use    Primary hypertension    Leg edema    Other orders  -     irbesartan (AVAPRO) 150 MG tablet; Take 0.5 tablets (75 mg total) by mouth every evening.  Dispense: 45 tablet; Refill: 3        #CKD2/3a - DM, HTN, age related changes  Baseline creatinine 1.1-1.3  UA 4+ glucose  UPCR 0.18    Creatinine    Date Value Ref Range Status   05/19/2025 1.1 0.5 - 1.4 mg/dL Final   05/11/2025 1.3 0.5 - 1.4 mg/dL Final   05/10/2025 1.2 0.5 - 1.4 mg/dL Final   06/21/2024 0.8 0.5 - 1.4 mg/dL Final   06/20/2024 1.0 0.5 - 1.4 mg/dL Final   06/19/2024 0.9 0.5 - 1.4 mg/dL Final     eGFR   Date Value Ref Range Status   05/19/2025 60 (L) >60 mL/min/1.73/m2 Final     Comment:     Estimated GFR calculated using the CKD-EPI creatinine (2021) equation.   05/11/2025 49 (L) >60 mL/min/1.73/m2 Final   05/10/2025 54 (L) >60 mL/min/1.73/m2 Final   06/21/2024 >60.0 >60 mL/min/1.73 m^2 Final   06/20/2024 >60.0 >60 mL/min/1.73 m^2 Final   06/19/2024 >60.0 >60 mL/min/1.73 m^2 Final       Imaging: no dedicated studies  Ordered RP US    #DM  Hemoglobin A1c:  Lab Results   Component Value Date    HGBA1C 8.8 (H) 05/10/2025    HGBA1C 10.0 (H) 02/06/2025    HGBA1C 14.8 (H) 06/18/2024   Ozempic 2 mg q7, jardiance 25 mg qd, insulin     #HTN  Home medications: lasix 20 (twice weekly), chlorthalidone 25,  amlodipine 5 mg    -DC amlodipine, DC lasix 20  -Change to irbesartan 150 mg, reports being on losartan in the past without adequate BP control.   -repeat labs 1 week and BP log for 1 week. Adjust regiment as needed    #Edema  Likely due to CCB. See above      RTC in 1 year      PAST HISTORY:     Past Medical History:   Diagnosis Date    Anxiety     Diabetes mellitus, type 2     Elevated cholesterol     GERD (gastroesophageal reflux disease)     Hypertension     PUD (peptic ulcer disease)        Past Surgical History:   Procedure Laterality Date    GASTRIC BYPASS      HYSTERECTOMY      KNEE SURGERY Left        Family History   Problem Relation Name Age of Onset    Heart disease Mother 39     Hypertension Mother 39     Heart attack Mother 39     Stroke Brother 28     Cancer Maternal Aunt      Cancer Maternal Grandmother          lung    Heart disease Maternal Grandmother      Diabetes Paternal Grandmother      Heart disease Paternal Grandmother       Diabetes Paternal Grandfather         Social History     Socioeconomic History    Marital status:    Tobacco Use    Smoking status: Never     Passive exposure: Never    Smokeless tobacco: Never   Substance and Sexual Activity    Alcohol use: No    Drug use: No    Sexual activity: Yes     Partners: Male     Social Drivers of Health     Financial Resource Strain: Patient Declined (5/10/2025)    Overall Financial Resource Strain (CARDIA)     Difficulty of Paying Living Expenses: Patient declined   Food Insecurity: Patient Declined (5/10/2025)    Hunger Vital Sign     Worried About Running Out of Food in the Last Year: Patient declined     Ran Out of Food in the Last Year: Patient declined   Transportation Needs: Patient Declined (5/10/2025)    PRAPARE - Transportation     Lack of Transportation (Medical): Patient declined     Lack of Transportation (Non-Medical): Patient declined   Physical Activity: Unknown (7/8/2024)    Exercise Vital Sign     Days of Exercise per Week: 0 days   Stress: Patient Declined (5/10/2025)    [employer] - Occupational Stress Questionnaire     Feeling of Stress : Patient declined   Housing Stability: Patient Declined (5/10/2025)    Housing Stability Vital Sign     Unable to Pay for Housing in the Last Year: Patient declined     Homeless in the Last Year: Patient declined       MEDICATIONS & ALLERGIES:     Current Outpatient Medications on File Prior to Visit   Medication Sig    amLODIPine (NORVASC) 5 MG tablet Take 5 mg by mouth once daily.    aspirin (ECOTRIN) 81 MG EC tablet Take 1 tablet (81 mg total) by mouth once daily.    atorvastatin (LIPITOR) 80 MG tablet Take 1 tablet (80 mg total) by mouth once daily.    blood sugar diagnostic Strp To check BG 4 times daily, to use with insurance preferred meter    blood-glucose meter (ACCU-CHEK GUIDE GLUCOSE METER) Misc use to check BG 4 times a day    chlorthalidone (HYGROTEN) 25 MG Tab Take 1 tablet (25 mg  total) by mouth once daily.    [Paused] ciprofloxacin HCl (CIPRO) 750 MG tablet Take 750 mg by mouth every 12 (twelve) hours.    ergocalciferol (ERGOCALCIFEROL) 50,000 unit Cap Take 50,000 Units by mouth every 7 days.    famotidine (PEPCID) 40 MG tablet Take 1 tablet (40 mg total) by mouth once daily.    furosemide (LASIX) 20 MG tablet Take 20 mg by mouth twice a week.    insulin glargine,hum.rec.anlog (LANTUS SOLOSTAR U-100 INSULIN SUBQ) Inject 25 Units into the skin 2 (two) times a day.    JARDIANCE 25 mg tablet Take 25 mg by mouth once daily.    lancets Misc Use to check BG four times daily    meclizine (ANTIVERT) 25 mg tablet Take 1 tablet (25 mg total) by mouth 3 (three) times daily as needed for Dizziness.    OZEMPIC 2 mg/dose (8 mg/3 mL) PnIj Inject 2 mg into the skin every 7 days.    pregabalin (LYRICA) 50 MG capsule Take 50 mg by mouth 3 (three) times daily.     No current facility-administered medications on file prior to visit.       Review of patient's allergies indicates:   Allergen Reactions    Penicillins Itching and Anaphylaxis    Sulfa (sulfonamide antibiotics) Anaphylaxis    Meperidine Itching       OBJECTIVE:     Vital Signs:  There were no vitals filed for this visit.    There is no height or weight on file to calculate BMI.     Physical Exam  Constitutional:       General: She is not in acute distress.     Appearance: Normal appearance. She is not ill-appearing.   HENT:      Head: Normocephalic and atraumatic.      Mouth/Throat:      Pharynx: No oropharyngeal exudate or posterior oropharyngeal erythema.   Eyes:      General: No scleral icterus.        Right eye: No discharge.         Left eye: No discharge.      Extraocular Movements: Extraocular movements intact.      Conjunctiva/sclera: Conjunctivae normal.      Pupils: Pupils are equal, round, and reactive to light.   Neck:      Trachea: No tracheal deviation.   Cardiovascular:      Rate and Rhythm: Normal rate and regular rhythm.      Heart  sounds: No murmur heard.  Pulmonary:      Effort: Pulmonary effort is normal. No respiratory distress.      Breath sounds: Normal breath sounds.   Abdominal:      General: Abdomen is flat. Bowel sounds are normal. There is no distension.      Palpations: Abdomen is soft. There is no mass.      Tenderness: There is no abdominal tenderness.   Musculoskeletal:         General: No swelling, tenderness or deformity. Normal range of motion.      Cervical back: Neck supple.   Skin:     General: Skin is warm and dry.      Coloration: Skin is not jaundiced or pale.   Neurological:      General: No focal deficit present.      Mental Status: She is alert and oriented to person, place, and time. Mental status is at baseline.         Laboratory  Sodium   Date Value Ref Range Status   05/19/2025 139 136 - 145 mmol/L Final   05/11/2025 140 136 - 145 mmol/L Final   05/10/2025 139 136 - 145 mmol/L Final     Potassium   Date Value Ref Range Status   05/19/2025 3.9 3.5 - 5.1 mmol/L Final   05/11/2025 3.7 3.5 - 5.1 mmol/L Final   05/10/2025 3.8 3.5 - 5.1 mmol/L Final     Chloride   Date Value Ref Range Status   05/19/2025 107 95 - 110 mmol/L Final   05/11/2025 107 95 - 110 mmol/L Final   05/10/2025 103 95 - 110 mmol/L Final     CO2   Date Value Ref Range Status   05/19/2025 22 (L) 23 - 29 mmol/L Final   05/11/2025 22 (L) 23 - 29 mmol/L Final   05/10/2025 26 23 - 29 mmol/L Final     BUN   Date Value Ref Range Status   05/19/2025 21 (H) 6 - 20 mg/dL Final   05/11/2025 21 (H) 6 - 20 mg/dL Final   05/10/2025 21 (H) 6 - 20 mg/dL Final     Creatinine   Date Value Ref Range Status   05/19/2025 1.1 0.5 - 1.4 mg/dL Final   05/11/2025 1.3 0.5 - 1.4 mg/dL Final   05/10/2025 1.2 0.5 - 1.4 mg/dL Final     eGFR   Date Value Ref Range Status   05/19/2025 60 (L) >60 mL/min/1.73/m2 Final     Comment:     Estimated GFR calculated using the CKD-EPI creatinine (2021) equation.   05/11/2025 49 (L) >60 mL/min/1.73/m2 Final   05/10/2025 54 (L) >60  mL/min/1.73/m2 Final   06/21/2024 >60.0 >60 mL/min/1.73 m^2 Final   06/20/2024 >60.0 >60 mL/min/1.73 m^2 Final     Calcium   Date Value Ref Range Status   05/19/2025 9.4 8.7 - 10.5 mg/dL Final   05/11/2025 9.2 8.7 - 10.5 mg/dL Final   05/10/2025 9.8 8.7 - 10.5 mg/dL Final     Phosphorus Level   Date Value Ref Range Status   05/19/2025 4.0 2.7 - 4.5 mg/dL Final     Phosphorus   Date Value Ref Range Status   06/19/2024 3.4 2.7 - 4.5 mg/dL Final   06/18/2024 2.9 2.7 - 4.5 mg/dL Final     Albumin   Date Value Ref Range Status   05/19/2025 3.3 (L) 3.5 - 5.2 g/dL Final   05/11/2025 3.1 (L) 3.5 - 5.2 g/dL Final   05/10/2025 3.9 3.5 - 5.2 g/dL Final       Diagnostic Results:      Health Maintenance Due   Topic Date Due    Foot Exam  Never done    Diabetic Eye Exam  Never done    TETANUS VACCINE  Never done    Pneumococcal Vaccines (Age 50+) (1 of 2 - PCV) Never done    Shingles Vaccine (1 of 2) Never done    COVID-19 Vaccine (3 - 2024-25 season) 09/01/2024    Mammogram  11/30/2024    Lipid Panel  06/18/2025         Visit today included increased complexity associated with the care of the episodic problem DM, HTN, lower extremity edema addressed and managing the longitudinal care of the patient due to the serious and/or complex managed problem(s) CKD.    Isaias Garay MD  Nephrology [facility]

## 2025-05-21 ENCOUNTER — OFFICE VISIT (OUTPATIENT)
Dept: NEPHROLOGY | Facility: CLINIC | Age: 55
End: 2025-05-21
Payer: MEDICAID

## 2025-05-21 VITALS
WEIGHT: 184.69 LBS | BODY MASS INDEX: 30.77 KG/M2 | DIASTOLIC BLOOD PRESSURE: 70 MMHG | HEART RATE: 100 BPM | HEIGHT: 65 IN | OXYGEN SATURATION: 97 % | SYSTOLIC BLOOD PRESSURE: 126 MMHG | RESPIRATION RATE: 18 BRPM

## 2025-05-21 DIAGNOSIS — E13.22: ICD-10-CM

## 2025-05-21 DIAGNOSIS — N18.31: ICD-10-CM

## 2025-05-21 DIAGNOSIS — R82.998 HIGH URINE CREATINE: ICD-10-CM

## 2025-05-21 DIAGNOSIS — N18.31 STAGE 3A CHRONIC KIDNEY DISEASE: Primary | ICD-10-CM

## 2025-05-21 DIAGNOSIS — I10 PRIMARY HYPERTENSION: ICD-10-CM

## 2025-05-21 DIAGNOSIS — R60.0 LEG EDEMA: ICD-10-CM

## 2025-05-21 PROCEDURE — 99214 OFFICE O/P EST MOD 30 MIN: CPT | Mod: PBBFAC | Performed by: STUDENT IN AN ORGANIZED HEALTH CARE EDUCATION/TRAINING PROGRAM

## 2025-05-21 PROCEDURE — 99999 PR PBB SHADOW E&M-EST. PATIENT-LVL IV: CPT | Mod: PBBFAC,,, | Performed by: STUDENT IN AN ORGANIZED HEALTH CARE EDUCATION/TRAINING PROGRAM

## 2025-05-21 RX ORDER — IRBESARTAN 150 MG/1
75 TABLET ORAL NIGHTLY
Qty: 45 TABLET | Refills: 3 | Status: SHIPPED | OUTPATIENT
Start: 2025-05-21 | End: 2026-05-21

## 2025-05-21 NOTE — PATIENT INSTRUCTIONS
You have chronic kidney disease, remember that this is a reflection that your kidneys have seen some use rather than a specific process. There are many uncontrollable factors that progress chronic kidney disease - mainly that we use our kidneys 24/7. We will do what we can to prolong the life of your kidney:    Stay plenty hydrated: unless otherwise directed aim for at minimum 2 liters a day  Low salt diet: goal is less than 2 grams of salt daily  Low animal protein diet: greater than 1.3 grams per kilograms bodyweight per day. (I.e if you weigh 100 kilograms (220 lbs) dont exceed greater than 130 grams of protein daily)  Avoid NSAIDS (nonsteroidal anti-inflammatories) like ibuprofen, motrin, goodies powder, advil. Tylenol is okay    Lastly, control of your other medical conditions, weight loss and exercise will always help as well.    ----------------------     Stop the amlodipine and the lasix. Start the irbesartan 150 mg and please check your blood pressure twice a day for the next week. Check first thing in the morning and right before bed. Keep a log either on your phone or on a piece of paper and write down what happened that day--it does not need to be extensive, but should include if it was a good day or a bad day. (We need to make sure your blood pressure isnt elevated due to stress, pain, etc)    You can either send it to me on [employer] or drop it off at the office, cc: Dr Garay     We will get repeat labs in 1 week.     Schedule your ultrasound when you can and I will see you back in 1 year.

## 2025-05-22 ENCOUNTER — OFFICE VISIT (OUTPATIENT)
Dept: FAMILY MEDICINE | Facility: CLINIC | Age: 55
End: 2025-05-22
Payer: MEDICAID

## 2025-05-22 VITALS
HEIGHT: 65 IN | HEART RATE: 90 BPM | WEIGHT: 183.63 LBS | OXYGEN SATURATION: 99 % | RESPIRATION RATE: 14 BRPM | BODY MASS INDEX: 30.59 KG/M2 | SYSTOLIC BLOOD PRESSURE: 120 MMHG | DIASTOLIC BLOOD PRESSURE: 80 MMHG | TEMPERATURE: 98 F

## 2025-05-22 DIAGNOSIS — E11.649 UNCONTROLLED TYPE 2 DIABETES MELLITUS WITH HYPOGLYCEMIA WITHOUT COMA: ICD-10-CM

## 2025-05-22 DIAGNOSIS — Z12.31 ENCOUNTER FOR SCREENING MAMMOGRAM FOR MALIGNANT NEOPLASM OF BREAST: ICD-10-CM

## 2025-05-22 DIAGNOSIS — E78.2 MIXED HYPERLIPIDEMIA: ICD-10-CM

## 2025-05-22 DIAGNOSIS — E11.9 DIABETIC EYE EXAM: ICD-10-CM

## 2025-05-22 DIAGNOSIS — G45.9 TIA (TRANSIENT ISCHEMIC ATTACK): Primary | ICD-10-CM

## 2025-05-22 DIAGNOSIS — Z01.00 DIABETIC EYE EXAM: ICD-10-CM

## 2025-05-22 DIAGNOSIS — I10 PRIMARY HYPERTENSION: ICD-10-CM

## 2025-05-22 DIAGNOSIS — Z23 NEED FOR PNEUMOCOCCAL VACCINATION: ICD-10-CM

## 2025-05-22 PROCEDURE — 99214 OFFICE O/P EST MOD 30 MIN: CPT | Mod: PBBFAC,PO | Performed by: FAMILY MEDICINE

## 2025-05-22 PROCEDURE — 99999 PR PBB SHADOW E&M-EST. PATIENT-LVL IV: CPT | Mod: PBBFAC,,, | Performed by: FAMILY MEDICINE

## 2025-05-22 RX ORDER — LANCETS
EACH MISCELLANEOUS
Qty: 120 EACH | Refills: 3 | Status: SHIPPED | OUTPATIENT
Start: 2025-05-22

## 2025-05-22 RX ORDER — CLOPIDOGREL BISULFATE 75 MG/1
75 TABLET ORAL DAILY
Qty: 30 TABLET | Refills: 0 | Status: SHIPPED | OUTPATIENT
Start: 2025-05-22 | End: 2026-05-22

## 2025-05-22 NOTE — PROGRESS NOTES
Subjective:       Patient ID: Meg Lockhart is a 55 y.o. female.    Chief Complaint: Transitional Care ([facility] f/u [facility])    Problem List[1]  Patient is here for a hospital follow up.  New to me. PCP Dr. Andres  Reviewed labs 5/2025  History of Present Illness    CHIEF COMPLAINT:  Ms. Lockhart presents today for follow up.      ROS:  ROS findings as noted in HPI.Admitted [facility] 5/9/25   Patient states that she had a previous stroke in June which consisted of her having vertigo, loss of balance in her lower extremity, and burning sensation in her lower extremities.  Patient had an episode tonight in which she was in the bathroom and she felt like her whole-body locked up.  She felt vertigo and like she was unable to control her head.  She did hit her head against the wall.  She also complained of some chest discomfort which has mostly resolved.  She had episodes of nausea and vomiting and felt like her abdomen was punched.  Her abdominal discomfort and chest pain have subsided, but she continues to complain of mild nausea.  All of her symptoms have resolved.  Entire episode lasted a proximally 40 minutes.  Not complaining of any unilateral weakness, vision changes, numbness, shortness of breath, or fevers.  No URI symptoms.She had CVA 2024 with some residual right foot drop only    In the last 4 days was getting very similar symptoms listed above , which were what she had with her CVA in 2024      Patient was admitted to Hospital Medicine for transient ischemic attack.  Patient had a previous stroke in June of 2024.  At time of examined emergency department patient's symptoms had resolved.  Patient was evaluated by tele neuro recommended MRI and MRA.  Both were unremarkable for acute findings.  Patient endorsed recent increasing stress.  Patient was educated on the need to control stress levels this much as possible.  Patient educated on medication compliance.  Patient was continued on atorvastatin, aspirin, and  meclizine as needed for vertigo.  Patient refused PT and OT during hospitalization.  Patient has an order for outpatient physical therapy from outside provider.  Patient stated that she is not medicine she is recently been prescribed Ozempic.  Patient educated on the need to stay well hydrated and eat adequate meals.   Patient seen and examined day of discharge.  Patient in no acute distress.  Patient educated on discharge planning, she verbalized understanding.  Patient is a follow with primary care provider in 1 week.  Ambulatory referral to neurology has been placed.  Patient is safely discharged home.        Carotid us 5/2025 Calcified plaque of the bilateral carotid bulbs proximal right ICA. No hemodynamic significant stenosis.     Has neuro appt Dr. Marcial 6/30/2025. Has residual right leg weakness from previous stroke and wants to resume outpatient pt but not yet.  Was taking asa at time of TIA sx.    Review of Systems   Constitutional:  Negative for fatigue and unexpected weight change.   Respiratory:  Negative for chest tightness and shortness of breath.    Cardiovascular:  Negative for chest pain, palpitations and leg swelling.   Gastrointestinal:  Negative for abdominal pain.   Musculoskeletal:  Negative for arthralgias.   Neurological:  Positive for weakness. Negative for dizziness, syncope, light-headedness and headaches.      Relevant History:  Nephro CKD stage 3 ,anemia, elevated pth, low vit D on weekly vit d 50k,  elevated uric acid    Heme/onc chronic anemia, nl iron    Endocrine type 2 DM A1c 8.8 . On asa, lipitor, ARB.  On jardiance 25mg, lantus 25 u bid, ozempic  2mg     GI cologuard 2024 neg  Objective:      Physical Exam  Vitals and nursing note reviewed.   Constitutional:       Appearance: She is well-developed.   Cardiovascular:      Rate and Rhythm: Normal rate and regular rhythm.      Heart sounds: Normal heart sounds.   Pulmonary:      Effort: Pulmonary effort is normal.      Breath sounds:  Normal breath sounds.   Skin:     General: Skin is warm and dry.   Neurological:      Mental Status: She is alert and oriented to person, place, and time.         Assessment:       ICD-10-CM ICD-9-CM    1. Uncontrolled type 2 diabetes mellitus with hypoglycemia without coma  E11.649 250.82 CBC Auto Differential     251.2 Comprehensive Metabolic Panel      Hemoglobin A1C      2. Primary hypertension  I10 401.9       3. Mixed hyperlipidemia  E78.2 272.2 Lipid Panel      4. TIA (transient ischemic attack)  G45.9 435.9 clopidogreL (PLAVIX) 75 mg tablet      5. Diabetic eye exam  Z01.00 V72.0 Ambulatory referral/consult to Optometry    E11.9 250.00       6. Need for pneumococcal vaccination  Z23 V03.82       7. Encounter for screening mammogram for malignant neoplasm of breast  Z12.31 V76.12 Mammo Digital Screening Bilat w/ [employer] (XPD)         Plan:   1. Uncontrolled type 2 diabetes mellitus with hypoglycemia without coma (Primary)  Stable condition.  Continue current medications.  Will adjust based on lab findings or if condition changes.  F/u 3 months with PCP  - CBC Auto Differential; Future  - Comprehensive Metabolic Panel; Future  - Hemoglobin A1C; Future    2. Primary hypertension  Controlled on current medications.  Continue current medications.      3. Mixed hyperlipidemia  Stable condition.  Continue current medications.  Will adjust based on lab findings or if condition changes.    - Lipid Panel; Future    4. TIA (transient ischemic attack)  Cont asa and add for 30 days  - clopidogreL (PLAVIX) 75 mg tablet; Take 1 tablet (75 mg total) by mouth once daily.  Dispense: 30 tablet; Refill: 0  F/u neuro    5. Diabetic eye exam  Refer   - Ambulatory referral/consult to Optometry; Future    6. Need for pneumococcal vaccination  declined    7. Encounter for screening mammogram for malignant neoplasm of breast  Screen and treat as indicated:    - Mammo Digital Screening Bilat w/ [employer] (XPD); Future  Assessment & Plan    -  No plan information found.         Time spent with patient: 20 minutes  Patient with be reevaluated in 3 months or sooner prn  Greater than 50% of this visit was spent counseling as described in above documentation:Yes  This note was generated with the assistance of ambient listening technology. Verbal consent was obtained by the patient and accompanying visitor(s) for the recording of patient appointment to facilitate this note. I attest to having reviewed and edited the generated note for accuracy, though some syntax or spelling errors may persist. Please contact the author of this note for any clarification.            [1]   Patient Active Problem List  Diagnosis    Diabetes mellitus without complication    Diabetic neuropathy    Obesity    Vitamin D deficiency disease    Hypertension    Mixed hyperlipidemia    Impaired functional mobility, balance, gait, and endurance    Iron deficiency anemia    TIA (transient ischemic attack)

## 2025-05-27 ENCOUNTER — TELEPHONE (OUTPATIENT)
Dept: NEPHROLOGY | Facility: CLINIC | Age: 55
End: 2025-05-27
Payer: MEDICAID

## 2025-05-27 NOTE — TELEPHONE ENCOUNTER
Patient contacted the office in reference of starting the irbesartan. She reports taking a half of a tablet in the morning, and another in the evening. She states that she is also taking this with chlorthalidone, but feels that it is dropping her pressure too low. Also, states that furosemide was discontinued.     Please advise as patient states she doesn't think the two medications can be taken together, reported being very weak during her bath time.

## 2025-05-28 ENCOUNTER — PATIENT MESSAGE (OUTPATIENT)
Dept: NEPHROLOGY | Facility: CLINIC | Age: 55
End: 2025-05-28
Payer: MEDICAID

## 2025-05-29 ENCOUNTER — HOSPITAL ENCOUNTER (OUTPATIENT)
Dept: RADIOLOGY | Facility: HOSPITAL | Age: 55
Discharge: HOME | End: 2025-05-29
Attending: STUDENT IN AN ORGANIZED HEALTH CARE EDUCATION/TRAINING PROGRAM
Payer: MEDICAID

## 2025-05-29 DIAGNOSIS — N18.31 STAGE 3A CHRONIC KIDNEY DISEASE: ICD-10-CM

## 2025-05-29 PROCEDURE — 76770 US EXAM ABDO BACK WALL COMP: CPT | Mod: TC,PO

## 2025-05-29 PROCEDURE — 76770 US EXAM ABDO BACK WALL COMP: CPT | Mod: 26,,, | Performed by: RADIOLOGY

## 2025-05-29 RX ORDER — LANCETS
EACH MISCELLANEOUS
Qty: 120 EACH | Refills: 3 | Status: SHIPPED | OUTPATIENT
Start: 2025-05-29 | End: 2025-05-31

## 2025-05-30 DIAGNOSIS — E11.649 UNCONTROLLED TYPE 2 DIABETES MELLITUS WITH HYPOGLYCEMIA WITHOUT COMA: Primary | ICD-10-CM

## 2025-05-31 RX ORDER — BLOOD-GLUCOSE CONTROL, NORMAL
EACH MISCELLANEOUS
Qty: 400 EACH | Refills: 3 | Status: SHIPPED | OUTPATIENT
Start: 2025-05-31

## 2025-05-31 NOTE — TELEPHONE ENCOUNTER
Refill Routing Note   Medication(s) are not appropriate for processing by [facility] for the following reason(s):        Clarification of medication (Rx) details  New or recently adjusted medication    [facility] action(s):  Defer               Appointments  past 12m or future 3m with PCP    Date Provider   Last Visit   5/22/2025 Suki Castellon MD   Next Visit   Visit date not found Suki Castellon MD   ED visits in past 90 days: 0        Note composed:10:03 AM 05/31/2025

## 2025-06-02 DIAGNOSIS — Z12.11 ENCOUNTER FOR SCREENING FOR MALIGNANT NEOPLASM OF COLON: Primary | ICD-10-CM

## 2025-06-30 ENCOUNTER — TELEPHONE (OUTPATIENT)
Dept: NEUROLOGY | Facility: CLINIC | Age: 55
End: 2025-06-30
Payer: MEDICAID

## 2025-07-03 ENCOUNTER — HOSPITAL ENCOUNTER (OUTPATIENT)
Dept: RADIOLOGY | Facility: CLINIC | Age: 55
Discharge: HOME | End: 2025-07-03
Attending: STUDENT IN AN ORGANIZED HEALTH CARE EDUCATION/TRAINING PROGRAM
Payer: MEDICAID

## 2025-07-03 DIAGNOSIS — Z12.31 ENCOUNTER FOR SCREENING MAMMOGRAM FOR BREAST CANCER: ICD-10-CM

## 2025-07-03 PROCEDURE — 77063 BREAST TOMOSYNTHESIS BI: CPT | Mod: 26,,, | Performed by: RADIOLOGY

## 2025-07-03 PROCEDURE — 77063 BREAST TOMOSYNTHESIS BI: CPT | Mod: TC,PO

## 2025-07-03 PROCEDURE — 77067 SCR MAMMO BI INCL CAD: CPT | Mod: 26,,, | Performed by: RADIOLOGY

## 2025-07-07 ENCOUNTER — RESULTS FOLLOW-UP (OUTPATIENT)
Dept: FAMILY MEDICINE | Facility: CLINIC | Age: 55
End: 2025-07-07

## 2025-07-17 DIAGNOSIS — E11.9 TYPE 2 DIABETES MELLITUS WITHOUT COMPLICATION, UNSPECIFIED WHETHER LONG TERM INSULIN USE: ICD-10-CM

## 2025-08-12 ENCOUNTER — LAB VISIT (OUTPATIENT)
Dept: LAB | Facility: HOSPITAL | Age: 55
End: 2025-08-12
Attending: FAMILY MEDICINE
Payer: MEDICAID

## 2025-08-12 DIAGNOSIS — D50.9 IRON DEFICIENCY ANEMIA, UNSPECIFIED IRON DEFICIENCY ANEMIA TYPE: ICD-10-CM

## 2025-08-12 DIAGNOSIS — Z98.0: ICD-10-CM

## 2025-08-12 LAB
ABSOLUTE EOSINOPHIL (OHS): 0.14 K/UL
ABSOLUTE MONOCYTE (OHS): 0.41 K/UL (ref 0.3–1)
ABSOLUTE NEUTROPHIL COUNT (OHS): 2.37 K/UL (ref 1.8–7.7)
BASOPHILS # BLD AUTO: 0.03 K/UL
BASOPHILS NFR BLD AUTO: 0.6 %
ERYTHROCYTE [DISTWIDTH] IN BLOOD BY AUTOMATED COUNT: 13.8 % (ref 11.5–14.5)
FERRITIN SERPL-MCNC: 220.5 NG/ML (ref 20–300)
HCT VFR BLD AUTO: 36 % (ref 37–48.5)
HGB BLD-MCNC: 11.4 GM/DL (ref 12–16)
IMM GRANULOCYTES # BLD AUTO: 0.01 K/UL (ref 0–0.04)
IMM GRANULOCYTES NFR BLD AUTO: 0.2 % (ref 0–0.5)
IRON SATN MFR SERPL: 14 % (ref 20–50)
IRON SERPL-MCNC: 44 UG/DL (ref 30–160)
LYMPHOCYTES # BLD AUTO: 2.37 K/UL (ref 1–4.8)
MCH RBC QN AUTO: 28.9 PG (ref 27–31)
MCHC RBC AUTO-ENTMCNC: 31.7 G/DL (ref 32–36)
MCV RBC AUTO: 91 FL (ref 82–98)
NUCLEATED RBC (/100WBC) (OHS): 0 /100 WBC
PLATELET # BLD AUTO: 324 K/UL (ref 150–450)
PMV BLD AUTO: 10.1 FL (ref 9.2–12.9)
RBC # BLD AUTO: 3.95 M/UL (ref 4–5.4)
RELATIVE EOSINOPHIL (OHS): 2.6 %
RELATIVE LYMPHOCYTE (OHS): 44.5 % (ref 18–48)
RELATIVE MONOCYTE (OHS): 7.7 % (ref 4–15)
RELATIVE NEUTROPHIL (OHS): 44.4 % (ref 38–73)
TIBC SERPL-MCNC: 324 UG/DL (ref 250–450)
TRANSFERRIN SERPL-MCNC: 219 MG/DL (ref 200–375)
WBC # BLD AUTO: 5.33 K/UL (ref 3.9–12.7)

## 2025-08-12 PROCEDURE — 36415 COLL VENOUS BLD VENIPUNCTURE: CPT | Mod: PO

## 2025-08-12 PROCEDURE — 83540 ASSAY OF IRON: CPT | Performed by: INTERNAL MEDICINE

## 2025-08-12 PROCEDURE — 82728 ASSAY OF FERRITIN: CPT | Performed by: INTERNAL MEDICINE

## 2025-08-12 PROCEDURE — 85025 COMPLETE CBC W/AUTO DIFF WBC: CPT | Mod: PO

## 2025-08-13 ENCOUNTER — OFFICE VISIT (OUTPATIENT)
Dept: URGENT CARE | Facility: CLINIC | Age: 55
End: 2025-08-13
Payer: MEDICAID

## 2025-08-13 VITALS
HEART RATE: 87 BPM | BODY MASS INDEX: 31.16 KG/M2 | DIASTOLIC BLOOD PRESSURE: 81 MMHG | HEIGHT: 65 IN | RESPIRATION RATE: 18 BRPM | TEMPERATURE: 98 F | SYSTOLIC BLOOD PRESSURE: 121 MMHG | WEIGHT: 187 LBS

## 2025-08-13 DIAGNOSIS — M25.542 PAIN INVOLVING JOINT OF FINGER OF LEFT HAND: Primary | ICD-10-CM

## 2025-08-13 PROCEDURE — 73140 X-RAY EXAM OF FINGER(S): CPT | Mod: LT,S$GLB,, | Performed by: RADIOLOGY

## 2025-08-13 PROCEDURE — 99214 OFFICE O/P EST MOD 30 MIN: CPT | Mod: S$GLB,,,

## 2025-08-13 RX ORDER — HYDROCODONE BITARTRATE AND ACETAMINOPHEN 5; 325 MG/1; MG/1
1 TABLET ORAL EVERY 6 HOURS PRN
Qty: 6 TABLET | Refills: 0 | Status: SHIPPED | OUTPATIENT
Start: 2025-08-13 | End: 2025-08-13

## 2025-08-13 RX ORDER — HYDROCODONE BITARTRATE AND ACETAMINOPHEN 5; 325 MG/1; MG/1
1 TABLET ORAL EVERY 6 HOURS PRN
Qty: 6 TABLET | Refills: 0 | Status: SHIPPED | OUTPATIENT
Start: 2025-08-13

## 2025-08-19 ENCOUNTER — OFFICE VISIT (OUTPATIENT)
Dept: NEUROLOGY | Facility: CLINIC | Age: 55
End: 2025-08-19
Payer: MEDICAID

## 2025-08-19 VITALS
HEART RATE: 81 BPM | BODY MASS INDEX: 31.14 KG/M2 | WEIGHT: 186.94 LBS | DIASTOLIC BLOOD PRESSURE: 87 MMHG | HEIGHT: 65 IN | SYSTOLIC BLOOD PRESSURE: 149 MMHG

## 2025-08-19 DIAGNOSIS — H53.2 DOUBLE VISION WITH BOTH EYES OPEN: Primary | ICD-10-CM

## 2025-08-19 DIAGNOSIS — G62.9 NEUROPATHY: ICD-10-CM

## 2025-08-19 PROCEDURE — 4010F ACE/ARB THERAPY RXD/TAKEN: CPT | Mod: CPTII,,, | Performed by: STUDENT IN AN ORGANIZED HEALTH CARE EDUCATION/TRAINING PROGRAM

## 2025-08-19 PROCEDURE — 99215 OFFICE O/P EST HI 40 MIN: CPT | Mod: S$PBB,,, | Performed by: STUDENT IN AN ORGANIZED HEALTH CARE EDUCATION/TRAINING PROGRAM

## 2025-08-19 PROCEDURE — 99214 OFFICE O/P EST MOD 30 MIN: CPT | Mod: PBBFAC | Performed by: STUDENT IN AN ORGANIZED HEALTH CARE EDUCATION/TRAINING PROGRAM

## 2025-08-19 PROCEDURE — 99999 PR PBB SHADOW E&M-EST. PATIENT-LVL IV: CPT | Mod: PBBFAC,,, | Performed by: STUDENT IN AN ORGANIZED HEALTH CARE EDUCATION/TRAINING PROGRAM

## 2025-08-19 PROCEDURE — 3079F DIAST BP 80-89 MM HG: CPT | Mod: CPTII,,, | Performed by: STUDENT IN AN ORGANIZED HEALTH CARE EDUCATION/TRAINING PROGRAM

## 2025-08-19 PROCEDURE — 3066F NEPHROPATHY DOC TX: CPT | Mod: CPTII,,, | Performed by: STUDENT IN AN ORGANIZED HEALTH CARE EDUCATION/TRAINING PROGRAM

## 2025-08-19 PROCEDURE — 1159F MED LIST DOCD IN RCRD: CPT | Mod: CPTII,,, | Performed by: STUDENT IN AN ORGANIZED HEALTH CARE EDUCATION/TRAINING PROGRAM

## 2025-08-19 PROCEDURE — 3052F HG A1C>EQUAL 8.0%<EQUAL 9.0%: CPT | Mod: CPTII,,, | Performed by: STUDENT IN AN ORGANIZED HEALTH CARE EDUCATION/TRAINING PROGRAM

## 2025-08-19 PROCEDURE — 3060F POS MICROALBUMINURIA REV: CPT | Mod: CPTII,,, | Performed by: STUDENT IN AN ORGANIZED HEALTH CARE EDUCATION/TRAINING PROGRAM

## 2025-08-19 PROCEDURE — 3008F BODY MASS INDEX DOCD: CPT | Mod: CPTII,,, | Performed by: STUDENT IN AN ORGANIZED HEALTH CARE EDUCATION/TRAINING PROGRAM

## 2025-08-19 PROCEDURE — 3077F SYST BP >= 140 MM HG: CPT | Mod: CPTII,,, | Performed by: STUDENT IN AN ORGANIZED HEALTH CARE EDUCATION/TRAINING PROGRAM

## 2025-08-28 DIAGNOSIS — E11.9 TYPE 2 DIABETES MELLITUS WITHOUT COMPLICATIONS: Primary | ICD-10-CM

## 2025-09-05 ENCOUNTER — OFFICE VISIT (OUTPATIENT)
Dept: URGENT CARE | Facility: CLINIC | Age: 55
End: 2025-09-05
Payer: MEDICAID

## 2025-09-05 VITALS
SYSTOLIC BLOOD PRESSURE: 111 MMHG | TEMPERATURE: 98 F | WEIGHT: 186 LBS | OXYGEN SATURATION: 97 % | DIASTOLIC BLOOD PRESSURE: 72 MMHG | BODY MASS INDEX: 30.99 KG/M2 | RESPIRATION RATE: 18 BRPM | HEIGHT: 65 IN | HEART RATE: 97 BPM

## 2025-09-05 DIAGNOSIS — M54.31 SCIATICA OF RIGHT SIDE: Primary | ICD-10-CM

## 2025-09-05 LAB
B-HCG UR QL: NEGATIVE
CTP QC/QA: YES

## 2025-09-05 RX ORDER — KETOROLAC TROMETHAMINE 30 MG/ML
30 INJECTION, SOLUTION INTRAMUSCULAR; INTRAVENOUS
Status: SHIPPED | OUTPATIENT
Start: 2025-09-05

## 2025-09-05 RX ORDER — METHOCARBAMOL 500 MG/1
1000 TABLET, FILM COATED ORAL 3 TIMES DAILY
Qty: 21 TABLET | Refills: 0 | Status: SHIPPED | OUTPATIENT
Start: 2025-09-05 | End: 2025-09-12